# Patient Record
Sex: FEMALE | Race: WHITE | Employment: OTHER | RURAL
[De-identification: names, ages, dates, MRNs, and addresses within clinical notes are randomized per-mention and may not be internally consistent; named-entity substitution may affect disease eponyms.]

---

## 2018-07-31 ENCOUNTER — HOSPITAL ENCOUNTER (EMERGENCY)
Facility: HOSPITAL | Age: 73
Discharge: HOME | End: 2018-07-31
Attending: EMERGENCY MEDICINE
Payer: COMMERCIAL

## 2018-07-31 ENCOUNTER — APPOINTMENT (OUTPATIENT)
Dept: GENERAL RADIOLOGY | Facility: HOSPITAL | Age: 73
End: 2018-07-31
Payer: COMMERCIAL

## 2018-07-31 VITALS
HEIGHT: 63 IN | RESPIRATION RATE: 16 BRPM | HEART RATE: 104 BPM | OXYGEN SATURATION: 97 % | DIASTOLIC BLOOD PRESSURE: 57 MMHG | SYSTOLIC BLOOD PRESSURE: 109 MMHG | WEIGHT: 123 LBS | TEMPERATURE: 97.5 F | BODY MASS INDEX: 21.79 KG/M2

## 2018-07-31 DIAGNOSIS — S70.01XA CONTUSION OF RIGHT HIP, INITIAL ENCOUNTER: Primary | ICD-10-CM

## 2018-07-31 PROCEDURE — 73070 X-RAY EXAM OF ELBOW: CPT

## 2018-07-31 PROCEDURE — 73502 X-RAY EXAM HIP UNI 2-3 VIEWS: CPT

## 2018-07-31 PROCEDURE — 73030 X-RAY EXAM OF SHOULDER: CPT

## 2018-07-31 PROCEDURE — 99285 EMERGENCY DEPT VISIT HI MDM: CPT

## 2018-07-31 PROCEDURE — 6360000002 HC RX W HCPCS: Performed by: EMERGENCY MEDICINE

## 2018-07-31 PROCEDURE — 96372 THER/PROPH/DIAG INJ SC/IM: CPT

## 2018-07-31 RX ORDER — HYDROCODONE BITARTRATE AND ACETAMINOPHEN 10; 325 MG/1; MG/1
1 TABLET ORAL 4 TIMES DAILY PRN
Status: ON HOLD | COMMUNITY
End: 2019-07-03

## 2018-07-31 RX ORDER — MORPHINE SULFATE 4 MG/ML
4 INJECTION, SOLUTION INTRAMUSCULAR; INTRAVENOUS ONCE
Status: COMPLETED | OUTPATIENT
Start: 2018-07-31 | End: 2018-07-31

## 2018-07-31 RX ORDER — MIRTAZAPINE 15 MG/1
15 TABLET, FILM COATED ORAL NIGHTLY
COMMUNITY
End: 2019-07-02

## 2018-07-31 RX ADMIN — MORPHINE SULFATE 4 MG: 4 INJECTION INTRAVENOUS at 04:28

## 2018-07-31 NOTE — ED PROVIDER NOTES
Never Used    Alcohol use No    Drug use: No    Sexual activity: Not Asked     Other Topics Concern    None     Social History Narrative    None         PHYSICAL EXAM    (up to 7 for level 4, 8 or more for level 5)     ED Triage Vitals   BP Temp Temp src Pulse Resp SpO2 Height Weight   -- -- -- -- -- -- -- --       Physical Exam  General :Patient is awake, alert, oriented, in no acute distress, nontoxic appearing  HEENT: Pupils are equally round and reactive to light, EOMI. Cardiac: Heart regular rate, rhythm, no murmurs, rubs, or gallops  Lungs: Lungs are clear to auscultation, there is no wheezing, rhonchi, or rales. Abdomen: Abdomen is soft, nontender, nondistended. Musculoskeletal: Bedridden and contracted; pain with any movement of her RLE and right hip; diffuse right elbow tenderness to palpation but moving arm; no ecchymosis or deformity;   Back: No midline or bony tenderness. Dermatology: Skin is warm and dry  Psych: Mentation is grossly normal, cognition is grossly normal. Affect is appropriate. DIAGNOSTIC RESULTS       RADIOLOGY:   Non-plain film images such as CT, Ultrasound and MRI are read by the radiologist. Plain radiographic images are visualized and preliminarily interpreted by the emergency physician with the below findings:      [x] Radiologist's Report Reviewed:  XR HIP RIGHT (2-3 VIEWS)   Final Result     The right hip is not well seen. No acute fracture or dislocation. Consider repeat radiographs with improved positioning and technique if    there is clinical concern for right hip injury. XR ELBOW RIGHT (2 VIEWS)   Final Result     No acute fracture or dislocation. XR SHOULDER RIGHT (MIN 2 VIEWS)   Final Result     Degenerative changes of the glenohumeral and acromioclavicular joints. No acute osseous abnormality.                 ED BEDSIDE ULTRASOUND:   Performed by ED Physician - none    LABS:  Labs Reviewed - No data to display    I have reviewed

## 2018-07-31 NOTE — ED NOTES
Pt report given to [facility] . Pt will be transported back to [facility] and [address] [place] [address].       [address] [place] [place] RN  07/31/18 9724

## 2018-07-31 NOTE — ED NOTES
Placed call to [facility] to transport patient back to nursing home.      Greg Rosado RN  07/31/18 9937

## 2018-09-21 ENCOUNTER — OFFICE VISIT (OUTPATIENT)
Dept: SURGERY | Age: 73
End: 2018-09-21
Payer: COMMERCIAL

## 2018-09-21 VITALS — TEMPERATURE: 98.5 F | SYSTOLIC BLOOD PRESSURE: 122 MMHG | DIASTOLIC BLOOD PRESSURE: 36 MMHG | HEART RATE: 42 BPM

## 2018-09-21 DIAGNOSIS — R63.4 WEIGHT LOSS: ICD-10-CM

## 2018-09-21 DIAGNOSIS — F03.91 DEMENTIA WITH BEHAVIORAL DISTURBANCE, UNSPECIFIED DEMENTIA TYPE: Primary | ICD-10-CM

## 2018-09-21 PROCEDURE — 3017F COLORECTAL CA SCREEN DOC REV: CPT | Performed by: SURGERY

## 2018-09-21 PROCEDURE — 1101F PT FALLS ASSESS-DOCD LE1/YR: CPT | Performed by: SURGERY

## 2018-09-21 PROCEDURE — 99204 OFFICE O/P NEW MOD 45 MIN: CPT | Performed by: SURGERY

## 2018-09-21 PROCEDURE — 99204 OFFICE O/P NEW MOD 45 MIN: CPT

## 2018-09-21 PROCEDURE — 1090F PRES/ABSN URINE INCON ASSESS: CPT | Performed by: SURGERY

## 2018-09-21 PROCEDURE — G8420 CALC BMI NORM PARAMETERS: HCPCS | Performed by: SURGERY

## 2018-09-21 PROCEDURE — G8427 DOCREV CUR MEDS BY ELIG CLIN: HCPCS | Performed by: SURGERY

## 2018-09-21 RX ORDER — ONDANSETRON HYDROCHLORIDE 8 MG/1
8 TABLET, FILM COATED ORAL EVERY 8 HOURS PRN
Status: ON HOLD | COMMUNITY
End: 2019-07-04 | Stop reason: HOSPADM
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
## 2018-12-24 PROCEDURE — 81001 URINALYSIS AUTO W/SCOPE: CPT

## 2018-12-29 ENCOUNTER — LAB REQUISITION (OUTPATIENT)
Dept: LAB | Facility: HOSPITAL | Age: 73
End: 2018-12-29

## 2018-12-29 DIAGNOSIS — D64.9 ANEMIA: ICD-10-CM

## 2018-12-29 DIAGNOSIS — R53.1 WEAKNESS: ICD-10-CM

## 2018-12-29 LAB
ALBUMIN SERPL-MCNC: 4.23 G/DL (ref 3.2–4.8)
ALBUMIN/GLOB SERPL: 1.3 G/DL (ref 1.5–2.5)
ALP SERPL-CCNC: 98 U/L (ref 25–100)
ALT SERPL W P-5'-P-CCNC: 25 U/L (ref 7–40)
ANION GAP SERPL CALCULATED.3IONS-SCNC: 9 MMOL/L (ref 3–11)
AST SERPL-CCNC: 24 U/L (ref 0–33)
BASOPHILS # BLD AUTO: 0.04 10*3/MM3 (ref 0–0.2)
BASOPHILS NFR BLD AUTO: 0.6 % (ref 0–1)
BILIRUB SERPL-MCNC: 0.3 MG/DL (ref 0.3–1.2)
BUN BLD-MCNC: 31 MG/DL (ref 9–23)
BUN/CREAT SERPL: 37.3 (ref 7–25)
CALCIUM SPEC-SCNC: 9.3 MG/DL (ref 8.7–10.4)
CHLORIDE SERPL-SCNC: 100 MMOL/L (ref 99–109)
CO2 SERPL-SCNC: 27 MMOL/L (ref 20–31)
CREAT BLD-MCNC: 0.83 MG/DL (ref 0.6–1.3)
DEPRECATED RDW RBC AUTO: 43.8 FL (ref 37–54)
EOSINOPHIL # BLD AUTO: 0.05 10*3/MM3 (ref 0–0.3)
EOSINOPHIL NFR BLD AUTO: 0.7 % (ref 0–3)
ERYTHROCYTE [DISTWIDTH] IN BLOOD BY AUTOMATED COUNT: 16.6 % (ref 11.3–14.5)
GFR SERPL CREATININE-BSD FRML MDRD: 67 ML/MIN/1.73
GLOBULIN UR ELPH-MCNC: 3.2 GM/DL
GLUCOSE BLD-MCNC: 233 MG/DL (ref 70–100)
HCT VFR BLD AUTO: 29.6 % (ref 34.5–44)
HGB BLD-MCNC: 8.6 G/DL (ref 11.5–15.5)
HYPOCHROMIA BLD QL: NORMAL
IMM GRANULOCYTES # BLD AUTO: 0.01 10*3/MM3 (ref 0–0.03)
IMM GRANULOCYTES NFR BLD AUTO: 0.1 % (ref 0–0.6)
LYMPHOCYTES # BLD AUTO: 2.16 10*3/MM3 (ref 0.6–4.8)
LYMPHOCYTES NFR BLD AUTO: 30.4 % (ref 24–44)
MCH RBC QN AUTO: 21.1 PG (ref 27–31)
MCHC RBC AUTO-ENTMCNC: 29.1 G/DL (ref 32–36)
MCV RBC AUTO: 72.7 FL (ref 80–99)
MICROCYTES BLD QL: NORMAL
MONOCYTES # BLD AUTO: 0.59 10*3/MM3 (ref 0–1)
MONOCYTES NFR BLD AUTO: 8.3 % (ref 0–12)
NEUTROPHILS # BLD AUTO: 4.26 10*3/MM3 (ref 1.5–8.3)
NEUTROPHILS NFR BLD AUTO: 60 % (ref 41–71)
PLAT MORPH BLD: NORMAL
PLATELET # BLD AUTO: 542 10*3/MM3 (ref 150–450)
PMV BLD AUTO: 10.1 FL (ref 6–12)
POTASSIUM BLD-SCNC: 5.3 MMOL/L (ref 3.5–5.5)
PROT SERPL-MCNC: 7.4 G/DL (ref 5.7–8.2)
RBC # BLD AUTO: 4.07 10*6/MM3 (ref 3.89–5.14)
SODIUM BLD-SCNC: 136 MMOL/L (ref 132–146)
STOMATOCYTES BLD QL SMEAR: NORMAL
TARGETS BLD QL SMEAR: NORMAL
WBC MORPH BLD: NORMAL
WBC NRBC COR # BLD: 7.1 10*3/MM3 (ref 3.5–10.8)

## 2018-12-29 PROCEDURE — 85025 COMPLETE CBC W/AUTO DIFF WBC: CPT

## 2018-12-29 PROCEDURE — 80053 COMPREHEN METABOLIC PANEL: CPT

## 2018-12-29 PROCEDURE — 85007 BL SMEAR W/DIFF WBC COUNT: CPT

## 2019-01-01 ENCOUNTER — LAB REQUISITION (OUTPATIENT)
Dept: LAB | Facility: HOSPITAL | Age: 74
End: 2019-01-01

## 2019-01-01 DIAGNOSIS — Z00.00 ROUTINE GENERAL MEDICAL EXAMINATION AT A HEALTH CARE FACILITY: ICD-10-CM

## 2019-01-01 LAB
BACTERIA UR QL AUTO: ABNORMAL /HPF
BILIRUB UR QL STRIP: NEGATIVE
CLARITY UR: ABNORMAL
COLOR UR: ABNORMAL
GLUCOSE UR STRIP-MCNC: NEGATIVE MG/DL
HGB UR QL STRIP.AUTO: NEGATIVE
HYALINE CASTS UR QL AUTO: ABNORMAL /LPF
KETONES UR QL STRIP: NEGATIVE
LEUKOCYTE ESTERASE UR QL STRIP.AUTO: ABNORMAL
NITRITE UR QL STRIP: POSITIVE
PH UR STRIP.AUTO: <=5 [PH] (ref 5–8)
PROT UR QL STRIP: NEGATIVE
RBC # UR: ABNORMAL /HPF
REF LAB TEST METHOD: ABNORMAL
SP GR UR STRIP: 1.02 (ref 1–1.03)
SQUAMOUS #/AREA URNS HPF: ABNORMAL /HPF
UROBILINOGEN UR QL STRIP: ABNORMAL
WBC UR QL AUTO: ABNORMAL /HPF

## 2019-07-01 ENCOUNTER — LAB REQUISITION (OUTPATIENT)
Dept: LAB | Facility: HOSPITAL | Age: 74
End: 2019-07-01

## 2019-07-01 DIAGNOSIS — Z00.00 ROUTINE GENERAL MEDICAL EXAMINATION AT A HEALTH CARE FACILITY: ICD-10-CM

## 2019-07-01 LAB
ALBUMIN SERPL-MCNC: 3.3 G/DL (ref 3.5–5.2)
ALBUMIN/GLOB SERPL: 1 G/DL
ALP SERPL-CCNC: 79 U/L (ref 39–117)
ALT SERPL W P-5'-P-CCNC: 16 U/L (ref 1–33)
ANION GAP SERPL CALCULATED.3IONS-SCNC: 15 MMOL/L (ref 5–15)
AST SERPL-CCNC: 21 U/L (ref 1–32)
BASOPHILS # BLD AUTO: 0.02 10*3/MM3 (ref 0–0.2)
BASOPHILS NFR BLD AUTO: 0.4 % (ref 0–1.5)
BILIRUB SERPL-MCNC: <0.2 MG/DL (ref 0.2–1.2)
BUN BLD-MCNC: 80 MG/DL (ref 8–23)
BUN/CREAT SERPL: 51 (ref 7–25)
CALCIUM SPEC-SCNC: 9.1 MG/DL (ref 8.6–10.5)
CHLORIDE SERPL-SCNC: 95 MMOL/L (ref 98–107)
CO2 SERPL-SCNC: 22 MMOL/L (ref 22–29)
CREAT BLD-MCNC: 1.57 MG/DL (ref 0.57–1)
DEPRECATED RDW RBC AUTO: 50.2 FL (ref 37–54)
EOSINOPHIL # BLD AUTO: 0.09 10*3/MM3 (ref 0–0.4)
EOSINOPHIL NFR BLD AUTO: 1.6 % (ref 0.3–6.2)
ERYTHROCYTE [DISTWIDTH] IN BLOOD BY AUTOMATED COUNT: 16.1 % (ref 12.3–15.4)
GFR SERPL CREATININE-BSD FRML MDRD: 32 ML/MIN/1.73
GLOBULIN UR ELPH-MCNC: 3.4 GM/DL
GLUCOSE BLD-MCNC: 111 MG/DL (ref 65–99)
HCT VFR BLD AUTO: 22.4 % (ref 34–46.6)
HGB BLD-MCNC: 6.4 G/DL (ref 12–15.9)
IMM GRANULOCYTES # BLD AUTO: 0.02 10*3/MM3 (ref 0–0.05)
IMM GRANULOCYTES NFR BLD AUTO: 0.4 % (ref 0–0.5)
LYMPHOCYTES # BLD AUTO: 1.28 10*3/MM3 (ref 0.7–3.1)
LYMPHOCYTES NFR BLD AUTO: 23.4 % (ref 19.6–45.3)
MCH RBC QN AUTO: 24.4 PG (ref 26.6–33)
MCHC RBC AUTO-ENTMCNC: 28.6 G/DL (ref 31.5–35.7)
MCV RBC AUTO: 85.5 FL (ref 79–97)
MONOCYTES # BLD AUTO: 0.55 10*3/MM3 (ref 0.1–0.9)
MONOCYTES NFR BLD AUTO: 10.1 % (ref 5–12)
NEUTROPHILS # BLD AUTO: 3.51 10*3/MM3 (ref 1.7–7)
NEUTROPHILS NFR BLD AUTO: 64.1 % (ref 42.7–76)
NRBC BLD AUTO-RTO: 0 /100 WBC (ref 0–0.2)
PLATELET # BLD AUTO: 443 10*3/MM3 (ref 140–450)
PMV BLD AUTO: 10 FL (ref 6–12)
POTASSIUM BLD-SCNC: 4.8 MMOL/L (ref 3.5–5.2)
PROT SERPL-MCNC: 6.7 G/DL (ref 6–8.5)
RBC # BLD AUTO: 2.62 10*6/MM3 (ref 3.77–5.28)
SODIUM BLD-SCNC: 132 MMOL/L (ref 136–145)
WBC NRBC COR # BLD: 5.47 10*3/MM3 (ref 3.4–10.8)

## 2019-07-01 PROCEDURE — 85025 COMPLETE CBC W/AUTO DIFF WBC: CPT

## 2019-07-01 PROCEDURE — 80053 COMPREHEN METABOLIC PANEL: CPT

## 2019-07-02 ENCOUNTER — APPOINTMENT (OUTPATIENT)
Dept: CT IMAGING | Facility: HOSPITAL | Age: 74
DRG: 812 | End: 2019-07-02
Payer: MEDICARE

## 2019-07-02 ENCOUNTER — HOSPITAL ENCOUNTER (INPATIENT)
Facility: HOSPITAL | Age: 74
LOS: 2 days | Discharge: NURSING, REHAB OR LONG-TERM CARE | DRG: 812 | End: 2019-07-04
Attending: EMERGENCY MEDICINE | Admitting: INTERNAL MEDICINE
Payer: MEDICARE

## 2019-07-02 DIAGNOSIS — E87.1 HYPONATREMIA: ICD-10-CM

## 2019-07-02 DIAGNOSIS — D64.9 ANEMIA, UNSPECIFIED TYPE: Primary | ICD-10-CM

## 2019-07-02 DIAGNOSIS — K85.90 ACUTE PANCREATITIS, UNSPECIFIED COMPLICATION STATUS, UNSPECIFIED PANCREATITIS TYPE: ICD-10-CM

## 2019-07-02 DIAGNOSIS — D50.9 IRON DEFICIENCY ANEMIA, UNSPECIFIED IRON DEFICIENCY ANEMIA TYPE: ICD-10-CM

## 2019-07-02 PROBLEM — M54.9 CHRONIC BACK PAIN: Status: ACTIVE | Noted: 2019-07-02

## 2019-07-02 PROBLEM — R74.8 ELEVATED LIPASE: Status: ACTIVE | Noted: 2019-07-02

## 2019-07-02 PROBLEM — I95.9 HYPOTENSION: Status: ACTIVE | Noted: 2019-07-02

## 2019-07-02 PROBLEM — G89.29 CHRONIC BACK PAIN: Status: ACTIVE | Noted: 2019-07-02

## 2019-07-02 PROBLEM — N18.30 CKD (CHRONIC KIDNEY DISEASE) STAGE 3, GFR 30-59 ML/MIN (HCC): Status: ACTIVE | Noted: 2019-07-02

## 2019-07-02 PROBLEM — R53.81 DECLINING FUNCTIONAL STATUS: Status: ACTIVE | Noted: 2019-07-02

## 2019-07-02 PROBLEM — N17.9 ACUTE RENAL FAILURE (ARF) (HCC): Status: ACTIVE | Noted: 2019-07-02

## 2019-07-02 PROBLEM — N18.4 CKD (CHRONIC KIDNEY DISEASE) STAGE 4, GFR 15-29 ML/MIN (HCC): Status: ACTIVE | Noted: 2019-07-02

## 2019-07-02 PROBLEM — F03.90 DEMENTIA (HCC): Status: ACTIVE | Noted: 2019-07-02

## 2019-07-02 PROBLEM — E86.0 DEHYDRATION: Status: ACTIVE | Noted: 2019-07-02

## 2019-07-02 PROBLEM — R13.10 DYSPHAGIA: Status: ACTIVE | Noted: 2019-07-02

## 2019-07-02 LAB
A/G RATIO: 0.9 (ref 0.8–2)
ABO/RH: NORMAL
ALBUMIN SERPL-MCNC: 3 G/DL (ref 3.4–4.8)
ALP BLD-CCNC: 74 U/L (ref 25–100)
ALT SERPL-CCNC: 10 U/L (ref 4–36)
ANION GAP SERPL CALCULATED.3IONS-SCNC: 10 MMOL/L (ref 3–16)
ANION GAP SERPL CALCULATED.3IONS-SCNC: 11 MMOL/L (ref 3–16)
ANTIBODY SCREEN: NORMAL
AST SERPL-CCNC: 18 U/L (ref 8–33)
BACTERIA: ABNORMAL /HPF
BASOPHILS ABSOLUTE: 0 K/UL (ref 0–0.1)
BASOPHILS RELATIVE PERCENT: 0.4 %
BILIRUB SERPL-MCNC: <0.2 MG/DL (ref 0.3–1.2)
BILIRUBIN URINE: NEGATIVE
BLOOD BANK DISPENSE STATUS: NORMAL
BLOOD BANK DISPENSE STATUS: NORMAL
BLOOD BANK PRODUCT CODE: NORMAL
BLOOD BANK PRODUCT CODE: NORMAL
BLOOD, URINE: NEGATIVE
BPU ID: NORMAL
BPU ID: NORMAL
BUN BLDV-MCNC: 49 MG/DL (ref 6–20)
BUN BLDV-MCNC: 79 MG/DL (ref 6–20)
CALCIUM SERPL-MCNC: 8.6 MG/DL (ref 8.5–10.5)
CALCIUM SERPL-MCNC: 8.6 MG/DL (ref 8.5–10.5)
CHLORIDE BLD-SCNC: 106 MMOL/L (ref 98–107)
CHLORIDE BLD-SCNC: 96 MMOL/L (ref 98–107)
CLARITY: CLEAR
CO2: 21 MMOL/L (ref 20–30)
CO2: 22 MMOL/L (ref 20–30)
COLOR: YELLOW
COMPONENT: NORMAL
COMPONENT: NORMAL
CREAT SERPL-MCNC: 0.8 MG/DL (ref 0.4–1.2)
CREAT SERPL-MCNC: 1.5 MG/DL (ref 0.4–1.2)
DONOR TYPE/RH: NORMAL
DONOR TYPE/RH: NORMAL
EOSINOPHILS ABSOLUTE: 0.2 K/UL (ref 0–0.4)
EOSINOPHILS RELATIVE PERCENT: 3.4 %
EPITHELIAL CELLS, UA: ABNORMAL /HPF
FERRITIN: 32.8 NG/ML (ref 22–322)
FOLATE: >20 NG/ML
GFR AFRICAN AMERICAN: 41
GFR AFRICAN AMERICAN: >59
GFR NON-AFRICAN AMERICAN: 34
GFR NON-AFRICAN AMERICAN: >60
GLOBULIN: 3.3 G/DL
GLUCOSE BLD-MCNC: 106 MG/DL (ref 74–106)
GLUCOSE BLD-MCNC: 108 MG/DL (ref 74–106)
GLUCOSE BLD-MCNC: 128 MG/DL (ref 74–106)
GLUCOSE BLD-MCNC: 96 MG/DL (ref 74–106)
GLUCOSE BLD-MCNC: 97 MG/DL (ref 74–106)
GLUCOSE BLD-MCNC: 98 MG/DL (ref 74–106)
GLUCOSE URINE: NEGATIVE MG/DL
HBA1C MFR BLD: 5 %
HCT VFR BLD CALC: 21.4 % (ref 37–47)
HCT VFR BLD CALC: 28.4 % (ref 37–47)
HEMOGLOBIN: 6.3 G/DL (ref 11.5–16.5)
HEMOGLOBIN: 8.8 G/DL (ref 11.5–16.5)
HOLLISTER NO: NORMAL
HOLLISTER NO: NORMAL
IMMATURE GRANULOCYTES #: 0 K/UL
IMMATURE GRANULOCYTES %: 0.4 % (ref 0–5)
IRON SATURATION: 5 % (ref 15–50)
IRON: 18 UG/DL (ref 37–145)
KETONES, URINE: NEGATIVE MG/DL
LEUKOCYTE ESTERASE, URINE: NEGATIVE
LIPASE: 189 U/L (ref 5.6–51.3)
LYMPHOCYTES ABSOLUTE: 1.8 K/UL (ref 1.5–4)
LYMPHOCYTES RELATIVE PERCENT: 33.8 %
MCH RBC QN AUTO: 24.9 PG (ref 27–32)
MCH RBC QN AUTO: 26.3 PG (ref 27–32)
MCHC RBC AUTO-ENTMCNC: 29.4 G/DL (ref 31–35)
MCHC RBC AUTO-ENTMCNC: 31 G/DL (ref 31–35)
MCV RBC AUTO: 84.6 FL (ref 80–100)
MCV RBC AUTO: 84.8 FL (ref 80–100)
MICROSCOPIC EXAMINATION: YES
MONOCYTES ABSOLUTE: 0.6 K/UL (ref 0.2–0.8)
MONOCYTES RELATIVE PERCENT: 10.9 %
NEUTROPHILS ABSOLUTE: 2.7 K/UL (ref 2–7.5)
NEUTROPHILS RELATIVE PERCENT: 51.1 %
NITRITE, URINE: POSITIVE
OCCULT BLOOD SCREENING: ABNORMAL
PDW BLD-RTO: 15.1 % (ref 11–16)
PDW BLD-RTO: 16.2 % (ref 11–16)
PERFORMED ON: ABNORMAL
PERFORMED ON: ABNORMAL
PERFORMED ON: NORMAL
PERFORMED ON: NORMAL
PH UA: 5.5 (ref 5–8)
PLATELET # BLD: 324 K/UL (ref 150–400)
PLATELET # BLD: 389 K/UL (ref 150–400)
PMV BLD AUTO: 8.9 FL (ref 6–10)
PMV BLD AUTO: 9.3 FL (ref 6–10)
POTASSIUM SERPL-SCNC: 4.8 MMOL/L (ref 3.4–5.1)
POTASSIUM SERPL-SCNC: 4.8 MMOL/L (ref 3.4–5.1)
PROTEIN UA: NEGATIVE MG/DL
RBC # BLD: 2.53 M/UL (ref 3.8–5.8)
RBC # BLD: 3.35 M/UL (ref 3.8–5.8)
RBC UA: ABNORMAL /HPF (ref 0–2)
SODIUM BLD-SCNC: 129 MMOL/L (ref 136–145)
SODIUM BLD-SCNC: 137 MMOL/L (ref 136–145)
SPECIFIC GRAVITY UA: 1.01 (ref 1–1.03)
TOTAL CK: 134 U/L (ref 26–174)
TOTAL IRON BINDING CAPACITY: 379 UG/DL (ref 250–450)
TOTAL PROTEIN: 6.3 G/DL (ref 6.4–8.3)
TSH SERPL DL<=0.05 MIU/L-ACNC: 2.23 UIU/ML (ref 0.35–5.5)
URINE REFLEX TO CULTURE: YES
URINE TYPE: ABNORMAL
UROBILINOGEN, URINE: 0.2 E.U./DL
VITAMIN B-12: 1836 PG/ML (ref 211–911)
VITAMIN D 25-HYDROXY: 20.3 (ref 32–100)
WBC # BLD: 4.4 K/UL (ref 4–11)
WBC # BLD: 5.2 K/UL (ref 4–11)
WBC UA: ABNORMAL /HPF (ref 0–5)

## 2019-07-02 PROCEDURE — 83036 HEMOGLOBIN GLYCOSYLATED A1C: CPT

## 2019-07-02 PROCEDURE — 36415 COLL VENOUS BLD VENIPUNCTURE: CPT

## 2019-07-02 PROCEDURE — G0328 FECAL BLOOD SCRN IMMUNOASSAY: HCPCS

## 2019-07-02 PROCEDURE — 82607 VITAMIN B-12: CPT

## 2019-07-02 PROCEDURE — P9016 RBC LEUKOCYTES REDUCED: HCPCS

## 2019-07-02 PROCEDURE — 87186 SC STD MICRODIL/AGAR DIL: CPT

## 2019-07-02 PROCEDURE — 87077 CULTURE AEROBIC IDENTIFY: CPT

## 2019-07-02 PROCEDURE — 83550 IRON BINDING TEST: CPT

## 2019-07-02 PROCEDURE — G8996 SWALLOW CURRENT STATUS: HCPCS

## 2019-07-02 PROCEDURE — 6370000000 HC RX 637 (ALT 250 FOR IP): Performed by: INTERNAL MEDICINE

## 2019-07-02 PROCEDURE — 81001 URINALYSIS AUTO W/SCOPE: CPT

## 2019-07-02 PROCEDURE — 6360000002 HC RX W HCPCS: Performed by: PHYSICIAN ASSISTANT

## 2019-07-02 PROCEDURE — 85025 COMPLETE CBC W/AUTO DIFF WBC: CPT

## 2019-07-02 PROCEDURE — 82306 VITAMIN D 25 HYDROXY: CPT

## 2019-07-02 PROCEDURE — 86901 BLOOD TYPING SEROLOGIC RH(D): CPT

## 2019-07-02 PROCEDURE — 86900 BLOOD TYPING SEROLOGIC ABO: CPT

## 2019-07-02 PROCEDURE — 97166 OT EVAL MOD COMPLEX 45 MIN: CPT

## 2019-07-02 PROCEDURE — 86850 RBC ANTIBODY SCREEN: CPT

## 2019-07-02 PROCEDURE — 84443 ASSAY THYROID STIM HORMONE: CPT

## 2019-07-02 PROCEDURE — 2580000003 HC RX 258: Performed by: EMERGENCY MEDICINE

## 2019-07-02 PROCEDURE — 74176 CT ABD & PELVIS W/O CONTRAST: CPT

## 2019-07-02 PROCEDURE — 2580000003 HC RX 258: Performed by: PHYSICIAN ASSISTANT

## 2019-07-02 PROCEDURE — 86920 COMPATIBILITY TEST SPIN: CPT

## 2019-07-02 PROCEDURE — 97802 MEDICAL NUTRITION INDIV IN: CPT

## 2019-07-02 PROCEDURE — 36430 TRANSFUSION BLD/BLD COMPNT: CPT

## 2019-07-02 PROCEDURE — 82728 ASSAY OF FERRITIN: CPT

## 2019-07-02 PROCEDURE — G8998 SWALLOW D/C STATUS: HCPCS

## 2019-07-02 PROCEDURE — 82550 ASSAY OF CK (CPK): CPT

## 2019-07-02 PROCEDURE — 92610 EVALUATE SWALLOWING FUNCTION: CPT

## 2019-07-02 PROCEDURE — 83540 ASSAY OF IRON: CPT

## 2019-07-02 PROCEDURE — 85027 COMPLETE CBC AUTOMATED: CPT

## 2019-07-02 PROCEDURE — 6370000000 HC RX 637 (ALT 250 FOR IP): Performed by: PHYSICIAN ASSISTANT

## 2019-07-02 PROCEDURE — 99285 EMERGENCY DEPT VISIT HI MDM: CPT

## 2019-07-02 PROCEDURE — 83690 ASSAY OF LIPASE: CPT

## 2019-07-02 PROCEDURE — 87086 URINE CULTURE/COLONY COUNT: CPT

## 2019-07-02 PROCEDURE — 80053 COMPREHEN METABOLIC PANEL: CPT

## 2019-07-02 PROCEDURE — G8997 SWALLOW GOAL STATUS: HCPCS

## 2019-07-02 PROCEDURE — C9113 INJ PANTOPRAZOLE SODIUM, VIA: HCPCS | Performed by: PHYSICIAN ASSISTANT

## 2019-07-02 PROCEDURE — 82746 ASSAY OF FOLIC ACID SERUM: CPT

## 2019-07-02 PROCEDURE — 1200000000 HC SEMI PRIVATE

## 2019-07-02 PROCEDURE — 99223 1ST HOSP IP/OBS HIGH 75: CPT | Performed by: INTERNAL MEDICINE

## 2019-07-02 RX ORDER — BISACODYL 10 MG
10 SUPPOSITORY, RECTAL RECTAL ONCE
Status: COMPLETED | OUTPATIENT
Start: 2019-07-02 | End: 2019-07-02

## 2019-07-02 RX ORDER — FERROUS SULFATE 325(65) MG
325 TABLET ORAL
Status: ON HOLD | COMMUNITY
End: 2020-01-29 | Stop reason: SDUPTHER

## 2019-07-02 RX ORDER — DONEPEZIL HYDROCHLORIDE 5 MG/1
10 TABLET, FILM COATED ORAL NIGHTLY
Status: DISCONTINUED | OUTPATIENT
Start: 2019-07-02 | End: 2019-07-04 | Stop reason: HOSPADM

## 2019-07-02 RX ORDER — PANTOPRAZOLE SODIUM 40 MG/1
40 TABLET, DELAYED RELEASE ORAL
Status: DISCONTINUED | OUTPATIENT
Start: 2019-07-02 | End: 2019-07-02

## 2019-07-02 RX ORDER — BISACODYL 10 MG
10 SUPPOSITORY, RECTAL RECTAL DAILY PRN
Status: DISCONTINUED | OUTPATIENT
Start: 2019-07-02 | End: 2019-07-04 | Stop reason: HOSPADM

## 2019-07-02 RX ORDER — FERROUS SULFATE 325(65) MG
325 TABLET ORAL
Status: DISCONTINUED | OUTPATIENT
Start: 2019-07-02 | End: 2019-07-02 | Stop reason: ALTCHOICE

## 2019-07-02 RX ORDER — METRONIDAZOLE 500 MG/1
500 TABLET ORAL EVERY 8 HOURS SCHEDULED
Status: DISCONTINUED | OUTPATIENT
Start: 2019-07-02 | End: 2019-07-02

## 2019-07-02 RX ORDER — ONDANSETRON 4 MG/1
8 TABLET, FILM COATED ORAL EVERY 8 HOURS PRN
Status: DISCONTINUED | OUTPATIENT
Start: 2019-07-02 | End: 2019-07-04 | Stop reason: HOSPADM

## 2019-07-02 RX ORDER — 0.9 % SODIUM CHLORIDE 0.9 %
500 INTRAVENOUS SOLUTION INTRAVENOUS ONCE
Status: COMPLETED | OUTPATIENT
Start: 2019-07-02 | End: 2019-07-02

## 2019-07-02 RX ORDER — M-VIT,TX,IRON,MINS/CALC/FOLIC 27MG-0.4MG
1 TABLET ORAL DAILY
Status: DISCONTINUED | OUTPATIENT
Start: 2019-07-02 | End: 2019-07-04 | Stop reason: HOSPADM

## 2019-07-02 RX ORDER — PHENAZOPYRIDINE HYDROCHLORIDE 95 MG/1
95 TABLET ORAL DAILY
Status: DISCONTINUED | OUTPATIENT
Start: 2019-07-02 | End: 2019-07-02

## 2019-07-02 RX ORDER — 0.9 % SODIUM CHLORIDE 0.9 %
10 VIAL (ML) INJECTION DAILY
Status: DISCONTINUED | OUTPATIENT
Start: 2019-07-02 | End: 2019-07-03

## 2019-07-02 RX ORDER — MORPHINE SULFATE 15 MG/1
15 TABLET, FILM COATED, EXTENDED RELEASE ORAL 2 TIMES DAILY
Status: DISCONTINUED | OUTPATIENT
Start: 2019-07-02 | End: 2019-07-04 | Stop reason: HOSPADM

## 2019-07-02 RX ORDER — VENLAFAXINE HYDROCHLORIDE 150 MG/1
150 CAPSULE, EXTENDED RELEASE ORAL DAILY
Status: DISCONTINUED | OUTPATIENT
Start: 2019-07-02 | End: 2019-07-04 | Stop reason: HOSPADM

## 2019-07-02 RX ORDER — ACETAMINOPHEN 650 MG/1
650 SUPPOSITORY RECTAL EVERY 6 HOURS PRN
Status: DISCONTINUED | OUTPATIENT
Start: 2019-07-02 | End: 2019-07-04 | Stop reason: HOSPADM

## 2019-07-02 RX ORDER — DOCUSATE SODIUM 100 MG/1
100 CAPSULE, LIQUID FILLED ORAL DAILY
Status: DISCONTINUED | OUTPATIENT
Start: 2019-07-02 | End: 2019-07-04 | Stop reason: HOSPADM

## 2019-07-02 RX ORDER — BISACODYL 10 MG
10 SUPPOSITORY, RECTAL RECTAL DAILY PRN
COMMUNITY

## 2019-07-02 RX ORDER — PANTOPRAZOLE SODIUM 40 MG/10ML
40 INJECTION, POWDER, LYOPHILIZED, FOR SOLUTION INTRAVENOUS 2 TIMES DAILY
Status: DISCONTINUED | OUTPATIENT
Start: 2019-07-02 | End: 2019-07-03

## 2019-07-02 RX ORDER — SODIUM CHLORIDE 9 MG/ML
INJECTION, SOLUTION INTRAVENOUS CONTINUOUS
Status: DISCONTINUED | OUTPATIENT
Start: 2019-07-02 | End: 2019-07-04 | Stop reason: HOSPADM

## 2019-07-02 RX ORDER — SUCRALFATE 1 G/1
1 TABLET ORAL EVERY 6 HOURS SCHEDULED
Status: DISCONTINUED | OUTPATIENT
Start: 2019-07-02 | End: 2019-07-04 | Stop reason: HOSPADM

## 2019-07-02 RX ORDER — SIMVASTATIN 20 MG
40 TABLET ORAL DAILY
Status: DISCONTINUED | OUTPATIENT
Start: 2019-07-02 | End: 2019-07-04 | Stop reason: HOSPADM

## 2019-07-02 RX ORDER — PANTOPRAZOLE SODIUM 40 MG/1
40 GRANULE, DELAYED RELEASE ORAL EVERY OTHER DAY
Status: DISCONTINUED | OUTPATIENT
Start: 2019-07-02 | End: 2019-07-02

## 2019-07-02 RX ORDER — ACETAMINOPHEN 650 MG/1
650 SUPPOSITORY RECTAL EVERY 6 HOURS PRN
COMMUNITY

## 2019-07-02 RX ORDER — 0.9 % SODIUM CHLORIDE 0.9 %
1000 INTRAVENOUS SOLUTION INTRAVENOUS ONCE
Status: COMPLETED | OUTPATIENT
Start: 2019-07-02 | End: 2019-07-02

## 2019-07-02 RX ORDER — CYPROHEPTADINE HYDROCHLORIDE 4 MG/1
8 TABLET ORAL 3 TIMES DAILY
COMMUNITY

## 2019-07-02 RX ORDER — LISINOPRIL 20 MG/1
20 TABLET ORAL DAILY
Status: DISCONTINUED | OUTPATIENT
Start: 2019-07-02 | End: 2019-07-04 | Stop reason: HOSPADM

## 2019-07-02 RX ORDER — ZINC SULFATE 50(220)MG
220 CAPSULE ORAL DAILY
Status: DISCONTINUED | OUTPATIENT
Start: 2019-07-02 | End: 2019-07-04 | Stop reason: HOSPADM

## 2019-07-02 RX ORDER — CYPROHEPTADINE HYDROCHLORIDE 4 MG/1
4 TABLET ORAL 3 TIMES DAILY
Status: DISCONTINUED | OUTPATIENT
Start: 2019-07-02 | End: 2019-07-04 | Stop reason: HOSPADM

## 2019-07-02 RX ORDER — 0.9 % SODIUM CHLORIDE 0.9 %
250 INTRAVENOUS SOLUTION INTRAVENOUS ONCE
Status: DISCONTINUED | OUTPATIENT
Start: 2019-07-02 | End: 2019-07-04 | Stop reason: HOSPADM

## 2019-07-02 RX ORDER — FERROUS SULFATE TAB EC 324 MG (65 MG FE EQUIVALENT) 324 (65 FE) MG
324 TABLET DELAYED RESPONSE ORAL
Status: DISCONTINUED | OUTPATIENT
Start: 2019-07-02 | End: 2019-07-04 | Stop reason: HOSPADM

## 2019-07-02 RX ADMIN — CYPROHEPTADINE HYDROCHLORIDE 4 MG: 4 TABLET ORAL at 08:59

## 2019-07-02 RX ADMIN — IRON SUCROSE 200 MG: 20 INJECTION, SOLUTION INTRAVENOUS at 11:54

## 2019-07-02 RX ADMIN — BISACODYL 10 MG: 10 SUPPOSITORY RECTAL at 17:04

## 2019-07-02 RX ADMIN — SUCRALFATE 1 G: 1 TABLET ORAL at 17:04

## 2019-07-02 RX ADMIN — SIMVASTATIN 40 MG: 20 TABLET, FILM COATED ORAL at 08:59

## 2019-07-02 RX ADMIN — SODIUM CHLORIDE 1000 ML: 9 INJECTION, SOLUTION INTRAVENOUS at 01:10

## 2019-07-02 RX ADMIN — METRONIDAZOLE 500 MG: 500 TABLET, FILM COATED ORAL at 18:00

## 2019-07-02 RX ADMIN — MEROPENEM 1 G: 1 INJECTION, POWDER, FOR SOLUTION INTRAVENOUS at 22:30

## 2019-07-02 RX ADMIN — MORPHINE SULFATE 15 MG: 15 TABLET, FILM COATED, EXTENDED RELEASE ORAL at 21:42

## 2019-07-02 RX ADMIN — VENLAFAXINE HYDROCHLORIDE 150 MG: 150 CAPSULE, EXTENDED RELEASE ORAL at 08:58

## 2019-07-02 RX ADMIN — CYPROHEPTADINE HYDROCHLORIDE 4 MG: 4 TABLET ORAL at 13:33

## 2019-07-02 RX ADMIN — PANTOPRAZOLE SODIUM 40 MG: 40 GRANULE, DELAYED RELEASE ORAL at 08:59

## 2019-07-02 RX ADMIN — SODIUM CHLORIDE 500 ML: 9 INJECTION, SOLUTION INTRAVENOUS at 14:55

## 2019-07-02 RX ADMIN — MORPHINE SULFATE 15 MG: 15 TABLET, FILM COATED, EXTENDED RELEASE ORAL at 08:58

## 2019-07-02 RX ADMIN — CYPROHEPTADINE HYDROCHLORIDE 4 MG: 4 TABLET ORAL at 21:43

## 2019-07-02 RX ADMIN — METRONIDAZOLE 500 MG: 500 TABLET, FILM COATED ORAL at 21:42

## 2019-07-02 RX ADMIN — MULTIPLE VITAMINS W/ MINERALS TAB 1 TABLET: TAB at 08:59

## 2019-07-02 RX ADMIN — DOCUSATE SODIUM 100 MG: 100 CAPSULE, LIQUID FILLED ORAL at 17:04

## 2019-07-02 RX ADMIN — PANTOPRAZOLE SODIUM 40 MG: 40 INJECTION, POWDER, FOR SOLUTION INTRAVENOUS at 21:42

## 2019-07-02 RX ADMIN — DONEPEZIL HYDROCHLORIDE 10 MG: 5 TABLET, FILM COATED ORAL at 21:43

## 2019-07-02 RX ADMIN — FERROUS SULFATE TAB EC 324 MG (65 MG FE EQUIVALENT) 324 MG: 324 (65 FE) TABLET DELAYED RESPONSE at 08:59

## 2019-07-02 RX ADMIN — LINACLOTIDE 145 MCG: 145 CAPSULE, GELATIN COATED ORAL at 09:01

## 2019-07-02 RX ADMIN — ZINC SULFATE 220 MG (50 MG) CAPSULE 220 MG: CAPSULE at 08:59

## 2019-07-02 ASSESSMENT — PAIN SCALES - GENERAL
PAINLEVEL_OUTOF10: 10
PAINLEVEL_OUTOF10: 7
PAINLEVEL_OUTOF10: 7

## 2019-07-02 NOTE — CARE COORDINATION
Verbal orders received from PA-C to OT to cancel PT / OT orders due to functional status. Rehab orders can be submitted again if further evaluation is needed.       Electronically signed by Emily Williamson PT on 2/9/2648 at 5:44 PM

## 2019-07-02 NOTE — ED PROVIDER NOTES
Occupational History    None   Social Needs    Financial resource strain: None    Food insecurity:     Worry: None     Inability: None    Transportation needs:     Medical: None     Non-medical: None   Tobacco Use    Smoking status: Never Smoker    Smokeless tobacco: Never Used   Substance and Sexual Activity    Alcohol use: No    Drug use: No    Sexual activity: None   Lifestyle    Physical activity:     Days per week: None     Minutes per session: None    Stress: None   Relationships    Social connections:     Talks on phone: None     Gets together: None     Attends Mu-ism service: None     Active member of club or organization: None     Attends meetings of clubs or organizations: None     Relationship status: None    Intimate partner violence:     Fear of current or ex partner: None     Emotionally abused: None     Physically abused: None     Forced sexual activity: None   Other Topics Concern    None   Social History Narrative    None         PHYSICAL EXAM    (up to 7 forlevel 4, 8 or more for level 5)     ED Triage Vitals [07/02/19 0034]   BP Temp Temp Source Pulse Resp SpO2 Height Weight   (!) 83/38 98.2 °F (36.8 °C) Oral 99 14 94 % 5' 3\" (1.6 m) 116 lb (52.6 kg)       Physical Exam  General :Patient is awake, alert, nonverbal, in no acute distress, nontoxic appearing  HEENT: Pupils are equally round and reactive to light, EOMI. Pale conjunctivae  Cardiac: Heart regular rate, rhythm, no murmurs, rubs, or gallops  Lungs: Lungs are clear to auscultation, there is no wheezing, rhonchi, or rales. Abdomen:Abdomen is soft, nontender, nondistended. Musculoskeletal: Ambulatory  Back: No midline or bony tenderness.    Dermatology: Skin is warm and dry  Psych: Mentation is baseline dementia    DIAGNOSTIC RESULTS       RADIOLOGY:   Non-plain film images such as CT, Ultrasoundand MRI are read by the radiologist. Plain radiographic images are visualized and preliminarily interpreted by the emergency physician with the below findings:      [] Radiologist's Report Reviewed:  No orders to display         ED BEDSIDE ULTRASOUND:   Performed by ED Physician - none    LABS:  Labs Reviewed   CBC WITH AUTO DIFFERENTIAL - Abnormal; Notable for the following components:       Result Value    RBC 2.53 (*)     Hemoglobin 6.3 (*)     Hematocrit 21.4 (*)     MCH 24.9 (*)     MCHC 29.4 (*)     RDW 16.2 (*)     All other components within normal limits    Narrative:     RESULTS REPEATED AND VERIFIED  Performed at:  [address] [place] [address] Laboratory  [address] [place] [address]  [place], [address]   Phone [phone]   COMPREHENSIVE METABOLIC PANEL - Abnormal; Notable for the following components:    Sodium 129 (*)     Chloride 96 (*)     BUN 79 (*)     CREATININE 1.5 (*)     GFR Non- 34 (*)     GFR  41 (*)     Total Protein 6.3 (*)     Alb 3.0 (*)     Total Bilirubin <0.2 (*)     All other components within normal limits    Narrative:     Performed at:  [address] [place] [address] Laboratory  [address] [place], [place] [address], [address]   Phone [phone]   LIPASE - Abnormal; Notable for the following components:    Lipase 189.0 (*)     All other components within normal limits    Narrative:     Performed at:  [address] [place] [address] Laboratory  [address] [place] [address],  [place], [address]   Phone [phone]   TYPE AND SCREEN   PREPARE RBC (CROSSMATCH)       I have reviewed and interpreted all of the currently available lab resultsfrom this visit (if applicable):  Results for orders placed or performed during the hospital encounter of 07/02/19   CBC Auto Differential   Result Value Ref Range    WBC 5.2 4.0 - 11.0 K/uL    RBC 2.53 (L) 3.80 - 5.80 M/uL    Hemoglobin 6.3 (L) 11.5 - 16.5 g/dL    Hematocrit 21.4 (L) 37.0 - 47.0 %    MCV 84.6 80.0 - 100.0 fL    MCH 24.9 (L) 27.0 - 32.0 pg    MCHC 29.4 (L) 31.0 - 35.0 g/dL    RDW 16.2 (H) 11.0 -

## 2019-07-02 NOTE — H&P
rectally daily as needed for Constipation   Yes Historical Provider, MD   acetaminophen (TYLENOL) 650 MG suppository Place 650 mg rectally every 6 hours as needed for Fever   Yes Historical Provider, MD   ondansetron (ZOFRAN) 8 MG tablet Take 8 mg by mouth every 8 hours as needed for Nausea or Vomiting   Yes Historical Provider, MD   linaclotide (LINZESS) 145 MCG capsule Take 145 mcg by mouth every morning (before breakfast)   Yes Historical Provider, MD   HYDROcodone-acetaminophen (NORCO)  MG per tablet Take 1 tablet by mouth 4 times daily as needed for Pain. .   Yes Historical Provider, MD   Pantoprazole Sodium (PROTONIX PO) Take 40 mg by mouth every other day   Yes Historical Provider, MD   venlafaxine (EFFEXOR XR) 150 MG extended release capsule Take 150 mg by mouth daily   Yes Historical Provider, MD   Zinc Chelated 50 MG TABS Take 1 each by mouth daily   Yes Historical Provider, MD   metoprolol tartrate (LOPRESSOR) 50 MG tablet Take 25 mg by mouth daily   Yes Historical Provider, MD   Multiple Vitamins-Minerals (THERA M PLUS) TABS Take 1 each by mouth daily   Yes Historical Provider, MD   lisinopril (PRINIVIL;ZESTRIL) 20 MG tablet Take 20 mg by mouth daily   Yes Historical Provider, MD   pentosan polysulfate (ELMIRON) 100 MG capsule Take 100 mg by mouth 3 times daily (before meals)   Yes Historical Provider, MD   morphine (MS CONTIN) 15 MG extended release tablet Take 15 mg by mouth 2 times daily. .   Yes Historical Provider, MD   donepezil (ARICEPT) 10 MG tablet Take 10 mg by mouth nightly   Yes Historical Provider, MD   phenazopyridine (AZO URINARY PAIN RELIEF) 97.5 MG TABS Take 1 each by mouth daily   Yes Historical Provider, MD   simvastatin (ZOCOR) 40 MG tablet Take 40 mg by mouth daily   Yes Historical Provider, MD   metFORMIN (GLUCOPHAGE-XR) 750 MG extended release tablet Take 750 mg by mouth daily (with breakfast)    Historical Provider, MD   glipiZIDE (GLUCOTROL) 5 MG tablet Take 5 mg by mouth Oriented to date of birth. No apparent focal deficits noted   Psychiatric:  Speech and behavior appropriate         Lab Results   Component Value Date     (L) 07/02/2019    K 4.8 07/02/2019    CL 96 (L) 07/02/2019    CO2 22 07/02/2019    BUN 79 (H) 07/02/2019    CREATININE 1.5 (H) 07/02/2019    GLUCOSE 96 07/02/2019    CALCIUM 8.6 07/02/2019    PROT 6.3 (L) 07/02/2019    LABALBU 3.0 (L) 07/02/2019    BILITOT <0.2 (L) 07/02/2019    ALKPHOS 74 07/02/2019    AST 18 07/02/2019    ALT 10 07/02/2019    LABGLOM 34 (L) 07/02/2019    GFRAA 41 (L) 07/02/2019    AGRATIO 0.9 07/02/2019    GLOB 3.3 07/02/2019           Lab Results   Component Value Date    WBC 5.2 07/02/2019    HGB 6.3 (L) 07/02/2019    HCT 21.4 (L) 07/02/2019    MCV 84.6 07/02/2019     07/02/2019       CT ABDOMEN PELVIS WO CONTRAST Additional Contrast? None    (Results Pending)       Assessment and Plan     Active Hospital Problems    Diagnosis Date Noted    Anemia [D64.9]  Hgb 6.3. Was able to obtain labs from [facility] and patient with hemoglobin less than 7.5 on all lab work since march. venofer x2 ordered and will continue oral iron. Stool occult pending. On iv PPI and sucralfate. Will repeat cbc when transfusion complete. Monitor. Try to discuss with family when available regarding scopes. 07/02/2019    Dementia [F03.90]  Oriented to self and place. On donepezil.  07/02/2019    Declining functional status [R53.81]  [facility] term resident at [facility]. She has contractures in her legs and non ambulatory. Also with dementia and blind due to history of retinitis pigmentosa per chart review. Poor baseline functional status. Did review weights and she is currently 116. Appears that she was 120 lbs about 1 year ago when seen by dr shrestha. OT consulted but patient refuses. Hydrate with IVFs and transfuse. Discuss with family when available. 07/02/2019    Dehydration [E86.0]  Patient appears dehydrated on exam. Hyponatremic and hypotensive.

## 2019-07-03 PROBLEM — E55.9 VITAMIN D DEFICIENCY: Status: ACTIVE | Noted: 2019-07-03

## 2019-07-03 PROBLEM — N39.0 UTI (URINARY TRACT INFECTION): Status: ACTIVE | Noted: 2019-07-03

## 2019-07-03 PROBLEM — K56.41 FECAL IMPACTION IN RECTUM (HCC): Status: ACTIVE | Noted: 2019-07-03

## 2019-07-03 LAB
A/G RATIO: 1 (ref 0.8–2)
ALBUMIN SERPL-MCNC: 3.2 G/DL (ref 3.4–4.8)
ALP BLD-CCNC: 76 U/L (ref 25–100)
ALT SERPL-CCNC: 13 U/L (ref 4–36)
ANION GAP SERPL CALCULATED.3IONS-SCNC: 11 MMOL/L (ref 3–16)
AST SERPL-CCNC: 18 U/L (ref 8–33)
BILIRUB SERPL-MCNC: 0.4 MG/DL (ref 0.3–1.2)
BUN BLDV-MCNC: 28 MG/DL (ref 6–20)
CALCIUM SERPL-MCNC: 9.1 MG/DL (ref 8.5–10.5)
CHLORIDE BLD-SCNC: 109 MMOL/L (ref 98–107)
CO2: 21 MMOL/L (ref 20–30)
CREAT SERPL-MCNC: 0.7 MG/DL (ref 0.4–1.2)
GFR AFRICAN AMERICAN: >59
GFR NON-AFRICAN AMERICAN: >60
GLOBULIN: 3.2 G/DL
GLUCOSE BLD-MCNC: 122 MG/DL (ref 74–106)
GLUCOSE BLD-MCNC: 169 MG/DL (ref 74–106)
GLUCOSE BLD-MCNC: 169 MG/DL (ref 74–106)
GLUCOSE BLD-MCNC: 68 MG/DL (ref 74–106)
GLUCOSE BLD-MCNC: 96 MG/DL (ref 74–106)
GLUCOSE BLD-MCNC: 96 MG/DL (ref 74–106)
HCT VFR BLD CALC: 30.1 % (ref 37–47)
HEMOGLOBIN: 9.2 G/DL (ref 11.5–16.5)
MCH RBC QN AUTO: 26.3 PG (ref 27–32)
MCHC RBC AUTO-ENTMCNC: 30.6 G/DL (ref 31–35)
MCV RBC AUTO: 86 FL (ref 80–100)
PDW BLD-RTO: 15.7 % (ref 11–16)
PERFORMED ON: ABNORMAL
PERFORMED ON: NORMAL
PLATELET # BLD: 330 K/UL (ref 150–400)
PMV BLD AUTO: 9.2 FL (ref 6–10)
POTASSIUM SERPL-SCNC: 4.4 MMOL/L (ref 3.4–5.1)
RBC # BLD: 3.5 M/UL (ref 3.8–5.8)
SODIUM BLD-SCNC: 141 MMOL/L (ref 136–145)
TOTAL PROTEIN: 6.4 G/DL (ref 6.4–8.3)
WBC # BLD: 4.1 K/UL (ref 4–11)

## 2019-07-03 PROCEDURE — 6370000000 HC RX 637 (ALT 250 FOR IP): Performed by: INTERNAL MEDICINE

## 2019-07-03 PROCEDURE — 6370000000 HC RX 637 (ALT 250 FOR IP): Performed by: PHYSICIAN ASSISTANT

## 2019-07-03 PROCEDURE — 85027 COMPLETE CBC AUTOMATED: CPT

## 2019-07-03 PROCEDURE — 99232 SBSQ HOSP IP/OBS MODERATE 35: CPT | Performed by: INTERNAL MEDICINE

## 2019-07-03 PROCEDURE — 99222 1ST HOSP IP/OBS MODERATE 55: CPT | Performed by: SURGERY

## 2019-07-03 PROCEDURE — C9113 INJ PANTOPRAZOLE SODIUM, VIA: HCPCS | Performed by: PHYSICIAN ASSISTANT

## 2019-07-03 PROCEDURE — 6360000002 HC RX W HCPCS: Performed by: PHYSICIAN ASSISTANT

## 2019-07-03 PROCEDURE — 2580000003 HC RX 258: Performed by: PHYSICIAN ASSISTANT

## 2019-07-03 PROCEDURE — 36415 COLL VENOUS BLD VENIPUNCTURE: CPT

## 2019-07-03 PROCEDURE — 1200000000 HC SEMI PRIVATE

## 2019-07-03 PROCEDURE — 80053 COMPREHEN METABOLIC PANEL: CPT

## 2019-07-03 RX ORDER — PANTOPRAZOLE SODIUM 40 MG/10ML
40 INJECTION, POWDER, LYOPHILIZED, FOR SOLUTION INTRAVENOUS DAILY
Status: DISCONTINUED | OUTPATIENT
Start: 2019-07-04 | End: 2019-07-04 | Stop reason: HOSPADM

## 2019-07-03 RX ORDER — ERGOCALCIFEROL 1.25 MG/1
50000 CAPSULE ORAL WEEKLY
Status: DISCONTINUED | OUTPATIENT
Start: 2019-07-03 | End: 2019-07-04 | Stop reason: HOSPADM

## 2019-07-03 RX ORDER — 0.9 % SODIUM CHLORIDE 0.9 %
10 VIAL (ML) INJECTION DAILY
Status: DISCONTINUED | OUTPATIENT
Start: 2019-07-04 | End: 2019-07-04 | Stop reason: HOSPADM

## 2019-07-03 RX ADMIN — CYPROHEPTADINE HYDROCHLORIDE 4 MG: 4 TABLET ORAL at 20:52

## 2019-07-03 RX ADMIN — CYPROHEPTADINE HYDROCHLORIDE 4 MG: 4 TABLET ORAL at 09:33

## 2019-07-03 RX ADMIN — VENLAFAXINE HYDROCHLORIDE 150 MG: 150 CAPSULE, EXTENDED RELEASE ORAL at 09:33

## 2019-07-03 RX ADMIN — ERGOCALCIFEROL 50000 UNITS: 1.25 CAPSULE ORAL at 09:47

## 2019-07-03 RX ADMIN — DONEPEZIL HYDROCHLORIDE 10 MG: 5 TABLET, FILM COATED ORAL at 20:52

## 2019-07-03 RX ADMIN — CYPROHEPTADINE HYDROCHLORIDE 4 MG: 4 TABLET ORAL at 14:25

## 2019-07-03 RX ADMIN — ZINC SULFATE 220 MG (50 MG) CAPSULE 220 MG: CAPSULE at 09:33

## 2019-07-03 RX ADMIN — FERROUS SULFATE TAB EC 324 MG (65 MG FE EQUIVALENT) 324 MG: 324 (65 FE) TABLET DELAYED RESPONSE at 09:33

## 2019-07-03 RX ADMIN — SUCRALFATE 1 G: 1 TABLET ORAL at 18:29

## 2019-07-03 RX ADMIN — MORPHINE SULFATE 15 MG: 15 TABLET, FILM COATED, EXTENDED RELEASE ORAL at 09:47

## 2019-07-03 RX ADMIN — IRON SUCROSE 200 MG: 20 INJECTION, SOLUTION INTRAVENOUS at 10:33

## 2019-07-03 RX ADMIN — Medication 10 ML: at 09:47

## 2019-07-03 RX ADMIN — PANTOPRAZOLE SODIUM 40 MG: 40 INJECTION, POWDER, FOR SOLUTION INTRAVENOUS at 09:47

## 2019-07-03 RX ADMIN — INSULIN LISPRO 1 UNITS: 100 INJECTION, SOLUTION INTRAVENOUS; SUBCUTANEOUS at 11:37

## 2019-07-03 RX ADMIN — MEROPENEM 1 G: 1 INJECTION, POWDER, FOR SOLUTION INTRAVENOUS at 23:14

## 2019-07-03 RX ADMIN — MULTIPLE VITAMINS W/ MINERALS TAB 1 TABLET: TAB at 09:33

## 2019-07-03 RX ADMIN — DOCUSATE SODIUM 100 MG: 100 CAPSULE, LIQUID FILLED ORAL at 09:34

## 2019-07-03 RX ADMIN — SIMVASTATIN 40 MG: 20 TABLET, FILM COATED ORAL at 09:33

## 2019-07-03 RX ADMIN — MORPHINE SULFATE 15 MG: 15 TABLET, FILM COATED, EXTENDED RELEASE ORAL at 20:52

## 2019-07-03 RX ADMIN — SUCRALFATE 1 G: 1 TABLET ORAL at 11:37

## 2019-07-03 RX ADMIN — MEROPENEM 1 G: 1 INJECTION, POWDER, FOR SOLUTION INTRAVENOUS at 09:48

## 2019-07-03 ASSESSMENT — PAIN DESCRIPTION - PAIN TYPE
TYPE: ACUTE PAIN
TYPE: CHRONIC PAIN

## 2019-07-03 ASSESSMENT — PAIN SCALES - GENERAL
PAINLEVEL_OUTOF10: 5
PAINLEVEL_OUTOF10: 7
PAINLEVEL_OUTOF10: 6

## 2019-07-03 ASSESSMENT — PAIN DESCRIPTION - LOCATION
LOCATION: HEAD
LOCATION: HEAD
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
## 2019-07-04 VITALS
RESPIRATION RATE: 18 BRPM | SYSTOLIC BLOOD PRESSURE: 139 MMHG | TEMPERATURE: 98.7 F | DIASTOLIC BLOOD PRESSURE: 65 MMHG | BODY MASS INDEX: 20.55 KG/M2 | HEART RATE: 101 BPM | HEIGHT: 63 IN | OXYGEN SATURATION: 95 % | WEIGHT: 116 LBS

## 2019-07-04 LAB
ANION GAP SERPL CALCULATED.3IONS-SCNC: 8 MMOL/L (ref 3–16)
BUN BLDV-MCNC: 13 MG/DL (ref 6–20)
CALCIUM SERPL-MCNC: 9 MG/DL (ref 8.5–10.5)
CHLORIDE BLD-SCNC: 108 MMOL/L (ref 98–107)
CO2: 23 MMOL/L (ref 20–30)
CREAT SERPL-MCNC: 0.6 MG/DL (ref 0.4–1.2)
GFR AFRICAN AMERICAN: >59
GFR NON-AFRICAN AMERICAN: >60
GLUCOSE BLD-MCNC: 110 MG/DL (ref 74–106)
GLUCOSE BLD-MCNC: 114 MG/DL (ref 74–106)
HCT VFR BLD CALC: 29.5 % (ref 37–47)
HEMOGLOBIN: 8.8 G/DL (ref 11.5–16.5)
MCH RBC QN AUTO: 25.7 PG (ref 27–32)
MCHC RBC AUTO-ENTMCNC: 29.8 G/DL (ref 31–35)
MCV RBC AUTO: 86.3 FL (ref 80–100)
PDW BLD-RTO: 15.5 % (ref 11–16)
PERFORMED ON: ABNORMAL
PLATELET # BLD: 333 K/UL (ref 150–400)
PMV BLD AUTO: 9.3 FL (ref 6–10)
POTASSIUM SERPL-SCNC: 4.1 MMOL/L (ref 3.4–5.1)
RBC # BLD: 3.42 M/UL (ref 3.8–5.8)
SODIUM BLD-SCNC: 139 MMOL/L (ref 136–145)
WBC # BLD: 4.9 K/UL (ref 4–11)

## 2019-07-04 PROCEDURE — 2580000003 HC RX 258: Performed by: PHYSICIAN ASSISTANT

## 2019-07-04 PROCEDURE — 6370000000 HC RX 637 (ALT 250 FOR IP): Performed by: INTERNAL MEDICINE

## 2019-07-04 PROCEDURE — 36415 COLL VENOUS BLD VENIPUNCTURE: CPT

## 2019-07-04 PROCEDURE — 6370000000 HC RX 637 (ALT 250 FOR IP): Performed by: PHYSICIAN ASSISTANT

## 2019-07-04 PROCEDURE — 6360000002 HC RX W HCPCS: Performed by: PHYSICIAN ASSISTANT

## 2019-07-04 PROCEDURE — 85027 COMPLETE CBC AUTOMATED: CPT

## 2019-07-04 PROCEDURE — C9113 INJ PANTOPRAZOLE SODIUM, VIA: HCPCS | Performed by: PHYSICIAN ASSISTANT

## 2019-07-04 PROCEDURE — 80048 BASIC METABOLIC PNL TOTAL CA: CPT

## 2019-07-04 PROCEDURE — 99238 HOSP IP/OBS DSCHRG MGMT 30/<: CPT | Performed by: INTERNAL MEDICINE

## 2019-07-04 RX ORDER — PANTOPRAZOLE SODIUM 40 MG/1
40 TABLET, DELAYED RELEASE ORAL EVERY OTHER DAY
Qty: 30 TABLET | Refills: 3 | Status: SHIPPED | OUTPATIENT
Start: 2019-07-04 | End: 2019-10-27

## 2019-07-04 RX ORDER — PSEUDOEPHEDRINE HCL 30 MG
100 TABLET ORAL DAILY
Qty: 30 CAPSULE | Refills: 0 | Status: SHIPPED | OUTPATIENT
Start: 2019-07-05 | End: 2019-10-27

## 2019-07-04 RX ORDER — LEVOFLOXACIN 500 MG/1
500 TABLET, FILM COATED ORAL DAILY
Qty: 7 TABLET | Refills: 0 | Status: SHIPPED | OUTPATIENT
Start: 2019-07-05 | End: 2019-07-12

## 2019-07-04 RX ORDER — SUCRALFATE 1 G/1
1 TABLET ORAL 4 TIMES DAILY
Qty: 120 TABLET | Refills: 0 | Status: SHIPPED | OUTPATIENT
Start: 2019-07-04

## 2019-07-04 RX ADMIN — LINACLOTIDE 145 MCG: 145 CAPSULE, GELATIN COATED ORAL at 06:04

## 2019-07-04 RX ADMIN — FERROUS SULFATE TAB EC 324 MG (65 MG FE EQUIVALENT) 324 MG: 324 (65 FE) TABLET DELAYED RESPONSE at 09:51

## 2019-07-04 RX ADMIN — SODIUM CHLORIDE 10 ML: 9 INJECTION, SOLUTION INTRAMUSCULAR; INTRAVENOUS; SUBCUTANEOUS at 09:51

## 2019-07-04 RX ADMIN — SIMVASTATIN 40 MG: 20 TABLET, FILM COATED ORAL at 09:51

## 2019-07-04 RX ADMIN — VENLAFAXINE HYDROCHLORIDE 150 MG: 150 CAPSULE, EXTENDED RELEASE ORAL at 09:51

## 2019-07-04 RX ADMIN — MORPHINE SULFATE 15 MG: 15 TABLET, FILM COATED, EXTENDED RELEASE ORAL at 09:51

## 2019-07-04 RX ADMIN — MULTIPLE VITAMINS W/ MINERALS TAB 1 TABLET: TAB at 09:52

## 2019-07-04 RX ADMIN — ZINC SULFATE 220 MG (50 MG) CAPSULE 220 MG: CAPSULE at 09:50

## 2019-07-04 RX ADMIN — CYPROHEPTADINE HYDROCHLORIDE 4 MG: 4 TABLET ORAL at 09:51

## 2019-07-04 RX ADMIN — SODIUM CHLORIDE: 9 INJECTION, SOLUTION INTRAVENOUS at 09:59

## 2019-07-04 RX ADMIN — SUCRALFATE 1 G: 1 TABLET ORAL at 06:04

## 2019-07-04 RX ADMIN — MEROPENEM 1 G: 1 INJECTION, POWDER, FOR SOLUTION INTRAVENOUS at 11:15

## 2019-07-04 RX ADMIN — DOCUSATE SODIUM 100 MG: 100 CAPSULE, LIQUID FILLED ORAL at 09:51

## 2019-07-04 RX ADMIN — SUCRALFATE 1 G: 1 TABLET ORAL at 11:15

## 2019-07-04 RX ADMIN — PANTOPRAZOLE SODIUM 40 MG: 40 INJECTION, POWDER, FOR SOLUTION INTRAVENOUS at 09:51

## 2019-07-04 ASSESSMENT — PAIN SCALES - GENERAL: PAINLEVEL_OUTOF10: 7

## 2019-07-04 NOTE — DISCHARGE SUMMARY
back pain [M54.9, G89.29]  Continue home ms contin. Prescription for 3 days of medication provided per dr Shayla Chandler prior to dc.  07/02/2019    Hyponatremia [E87.1]  Na 129 on arrival. Within normal limits with IVF hydration. encourage oral intake at nursing facility and assist with feedings. 07/02/2019    Elevated lipase [R74.8]  No nausea, vomiting, abdominal pain. Ct abd/pelv without pancreatic abnormality. Hydrated with IVFs. 07/02/2019    Dysphagia [R13.10]  Continue diet recommended by slp. Aspiration precautions. 07/02/2019    Acute renal failure (ARF) (HCC) [N17.9]  Improved with IV fluid hydration. Renal function at baseline creatinine 0.6 prior to discharge back to nursing facility. Encourage oral intake at facility as above. Monitor labs periodically. Follow-up with PCP. 07/02/2019    Diabetes mellitus ([facility] [address] [E11.9]  Glipizide and metformin stopped on admission. A1c 5 . Did have episode of hypoglycemia with glucose 55 during hospital course with dose of low sliding scale insulin. She does not require diabetic medications or insulin at this time. Vital Signs  Temp: 98.7 °F (37.1 °C)  Pulse: 101  Resp: 18  BP: 139/65  SpO2: 95 %  O2 Device: None (Room air)       Vital signs reviewed in electronic chart. Physical exam  Constitutional:  Well developed, thin and frail elderly lady, no acute distress  Eyes:  Keeps eyes closed, conjunctiva normal, blind    HENT:  Atraumatic, external ears normal, nose normal, oropharynx and tongue dry, lips dry, no pharyngeal exudates. Neck- supple, no JVD, no lymphadenopathy  Respiratory:  No respiratory distress on room air, no wheezing, rales or rhonchi detected  Cardiovascular:  Normal rate, normal rhythm, no murmurs, no gallops, no rubs, no edema   GI:  Soft, nondistended, normal bowel sounds, nontender, no voluntary guarding  Musculoskeletal:  No cyanosis . Contractures noted BLE with decreased ROM.    Integument:  Warm and dry.  No rash.   Neurologic:  Alert, oriented to self and place. Oriented to date of birth. No apparent focal deficits noted . Answers questions appropriately. Tardive dyskinesia noted  Psychiatric:  Speech and behavior appropriate        Disposition: long term care facility  Discharged Condition: Stable  Activity: activity as tolerated  Diet: regular diet, pureed   Follow Up: Primary Care Physician in 1 week. Follow up with dr shrestha Monday for egd/colonoscopy. Will need bowel prep as ordered by dr shrestha on Sunday at nursing facility. Recommend patient proceed with the following lab (cbc) on 7/7     Labs:  For convenience and continuity at follow-up the following most recent labs are provided:    CBC:   Lab Results   Component Value Date    WBC 4.9 07/04/2019    HGB 8.8 07/04/2019    HCT 29.5 07/04/2019     07/04/2019       RENAL:   Lab Results   Component Value Date     07/04/2019    K 4.1 07/04/2019     07/04/2019    CO2 23 07/04/2019    BUN 13 07/04/2019    CREATININE 0.6 07/04/2019         Discharge Medications:      Current Discharge Medication List           Details   docusate sodium (COLACE, DULCOLAX) 100 MG CAPS Take 100 mg by mouth daily  Qty: 30 capsule, Refills: 0      sucralfate (CARAFATE) 1 GM tablet Take 1 tablet by mouth 4 times daily  Qty: 120 tablet, Refills: 0      levofloxacin (LEVAQUIN) 500 MG tablet Take 1 tablet by mouth daily for 7 days  Qty: 7 tablet, Refills: 0              Details   pantoprazole (PROTONIX) 40 MG tablet Take 1 tablet by mouth every other day Indications: suspension 30 mins prior to meal every other day  Qty: 30 tablet, Refills: 3      linaclotide (LINZESS) 145 MCG capsule Take 2 capsules by mouth every morning (before breakfast)  Qty: 60 capsule, Refills: 0              Details   cyproheptadine (PERIACTIN) 4 MG tablet Take 4 mg by mouth 3 times daily      ferrous sulfate 325 (65 Fe) MG tablet Take 325 mg by mouth daily (with breakfast)      bisacodyl (DULCOLAX)

## 2019-07-04 NOTE — PROGRESS NOTES
A medication reconciliation was performed for this patient per the fill records provided by [facility], the nursing home the patient currently resides at. The discrepancies and solutions are as follows:   - According to the home medications list, the patient was receiving Norco  mg, but this was not listed on the fill history. This medication was removed from the home meds list after confirming with the home that she does not fill this medication.
Report given to [facility]  RN, at [facility] [place], no further question. Reviewed DC instructions including Bowel prep for Dbl scope scheduled of Monday.
Speech Language Pathology  Facility/Department: [facility] MED SURG   CLINICAL BEDSIDE SWALLOW EVALUATION    NAME: Fermín Elkins  : 1945  MRN: [id number]    ADMISSION DATE: 2019  ADMITTING DIAGNOSIS: has Anemia on their problem list.  ONSET DATE: 2019    Recent Chest Xray/CT of Chest:  N/A  Date of Eval: 2019  Evaluating Therapist: Nitin Hilario    Current Diet level:  Current Diet : Puree  Current Liquid Diet : Thin    Primary Complaint  Patient Complaint: Patient reported \"I haven't been feeling good\". Pain:  Pain Assessment  Pain Level: 10    Reason for Referral  Fermín Elkins was referred for a bedside swallow evaluation to assess the efficiency of her swallow function, identify signs and symptoms of aspiration and make recommendations regarding safe dietary consistencies, effective compensatory strategies, and safe eating environment. Impression  Dysphagia Diagnosis: Moderate oral stage dysphagia  Dysphagia Impression : Mod oral phase dysphagia  Dysphagia Outcome Severity Scale: Level 4: Mild moderate dysphagia- Intermittent supervision/cueing. One - two diet consistencies restricted     Treatment Plan  Requires SLP Intervention: No  Duration/Frequency of Treatment: Not indicated at this time. D/C Recommendations: 24 hour supervision/assistance    Recommended Diet and Intervention  Diet Solids Recommendation: Dysphagia I Pureed  Liquid Consistency Recommendation: Thin  Recommended Form of Meds: Crushed in puree as able     Compensatory Swallowing Strategies  Compensatory Swallowing Strategies: Alternate solids and liquids;Small bites/sips;Upright as possible for all oral intake;Swallow 2 times per bite/sip; Assist feed;Eat/Feed slowly; Lingual sweep;Remain upright for 30-45 minutes after meals; Check for pocketing of food on the Left; Check for pocketing of food on the Right    Treatment/Goals  Short-term Goals  Timeframe for Short-term Goals: N/A  Long-term Goals  Timeframe
Spoke with Mike Cramer, daughter cell number [phone] ( new). She stated her mother had two other transfusions before. The scratch marks all over her body started a little over a month ago. She stated her heels have been bothering her. 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  She stated that she also was on pureed diet but she was not happy with it at the nursing home, the family gives her what she wants while they are there,
and not feeling well. Pt agreeable to OT evaluation. Patient Currently in Pain: Denies  Vital Signs  Patient Currently in Pain: Denies  Social/Functional History  Social/Functional History  Type of Home: Facility  Additional Comments: Pt is LTC resident at [facility] 1 year. Pt is dependent for all aspects of self care. [email or web address] and staff transports her to dining room. Pt states staff feeds her. Objective   Vision: Impaired  Vision Exceptions: Legally blind  Hearing: Within functional limits    Orientation  Overall Orientation Status: Within Functional Limits  Observation/Palpation  Observation: Pt lying in bed, NAD, pleasant and cooperative. Pt receiving blood transfusion, BLE contractures,               Bed mobility  Comment: Pt declined to complete bed mobility. Transfers  Sit to stand: Unable to assess  Vision - Basic Assessment  Prior Vision: Blind  Cognition  Overall Cognitive Status: WFL                 LUE AROM (degrees)  LUE AROM : WFL  RUE AROM (degrees)  RUE AROM : WFL  LUE Strength  Gross LUE Strength: WFL  RUE Strength  Gross RUE Strength: WFL        Therapy Time   Individual Concurrent Group Co-treatment   Time In 1120         Time Out 5512         Minutes 12              This note serves as a DC summary in the event of pt discharge.      Meera Gan, OTR/L

## 2019-07-05 LAB
ORGANISM: ABNORMAL
URINE CULTURE, ROUTINE: ABNORMAL
URINE CULTURE, ROUTINE: ABNORMAL

## 2019-07-08 ENCOUNTER — HOSPITAL ENCOUNTER (OUTPATIENT)
Facility: HOSPITAL | Age: 74
Setting detail: OUTPATIENT SURGERY
Discharge: HOME | End: 2019-07-08
Attending: SURGERY | Admitting: SURGERY
Payer: MEDICARE

## 2019-07-08 VITALS
DIASTOLIC BLOOD PRESSURE: 70 MMHG | TEMPERATURE: 98.3 F | WEIGHT: 104 LBS | BODY MASS INDEX: 18.43 KG/M2 | HEART RATE: 81 BPM | RESPIRATION RATE: 16 BRPM | HEIGHT: 63 IN | SYSTOLIC BLOOD PRESSURE: 107 MMHG | OXYGEN SATURATION: 99 %

## 2019-07-08 LAB
GLUCOSE BLD-MCNC: 114 MG/DL (ref 74–106)
HCT VFR BLD CALC: 28.7 % (ref 37–47)
HEMOGLOBIN: 8.6 G/DL (ref 11.5–16.5)
MCH RBC QN AUTO: 26.5 PG (ref 27–32)
MCHC RBC AUTO-ENTMCNC: 30 G/DL (ref 31–35)
MCV RBC AUTO: 88.3 FL (ref 80–100)
PDW BLD-RTO: 16.4 % (ref 11–16)
PERFORMED ON: ABNORMAL
PLATELET # BLD: 270 K/UL (ref 150–400)
PMV BLD AUTO: 9.4 FL (ref 6–10)
RBC # BLD: 3.25 M/UL (ref 3.8–5.8)
WBC # BLD: 3.4 K/UL (ref 4–11)

## 2019-07-08 PROCEDURE — 3609010600 HC COLONOSCOPY POLYPECTOMY SNARE/COLD BIOPSY: Performed by: SURGERY

## 2019-07-08 PROCEDURE — 43239 EGD BIOPSY SINGLE/MULTIPLE: CPT | Performed by: SURGERY

## 2019-07-08 PROCEDURE — 7100000011 HC PHASE II RECOVERY - ADDTL 15 MIN: Performed by: SURGERY

## 2019-07-08 PROCEDURE — 2580000003 HC RX 258: Performed by: SURGERY

## 2019-07-08 PROCEDURE — 6370000000 HC RX 637 (ALT 250 FOR IP): Performed by: SURGERY

## 2019-07-08 PROCEDURE — 45380 COLONOSCOPY AND BIOPSY: CPT | Performed by: SURGERY

## 2019-07-08 PROCEDURE — 99152 MOD SED SAME PHYS/QHP 5/>YRS: CPT | Performed by: SURGERY

## 2019-07-08 PROCEDURE — 3609012400 HC EGD TRANSORAL BIOPSY SINGLE/MULTIPLE: Performed by: SURGERY

## 2019-07-08 PROCEDURE — 85027 COMPLETE CBC AUTOMATED: CPT

## 2019-07-08 PROCEDURE — 36415 COLL VENOUS BLD VENIPUNCTURE: CPT

## 2019-07-08 PROCEDURE — 99153 MOD SED SAME PHYS/QHP EA: CPT | Performed by: SURGERY

## 2019-07-08 PROCEDURE — 6360000002 HC RX W HCPCS: Performed by: SURGERY

## 2019-07-08 PROCEDURE — 88305 TISSUE EXAM BY PATHOLOGIST: CPT

## 2019-07-08 PROCEDURE — 2709999900 HC NON-CHARGEABLE SUPPLY: Performed by: SURGERY

## 2019-07-08 PROCEDURE — 7100000010 HC PHASE II RECOVERY - FIRST 15 MIN: Performed by: SURGERY

## 2019-07-08 RX ORDER — LIDOCAINE HYDROCHLORIDE 40 MG/ML
SOLUTION TOPICAL PRN
Status: DISCONTINUED | OUTPATIENT
Start: 2019-07-08 | End: 2019-07-08 | Stop reason: ALTCHOICE

## 2019-07-08 RX ORDER — SODIUM CHLORIDE, SODIUM LACTATE, POTASSIUM CHLORIDE, CALCIUM CHLORIDE 600; 310; 30; 20 MG/100ML; MG/100ML; MG/100ML; MG/100ML
INJECTION, SOLUTION INTRAVENOUS CONTINUOUS
Status: DISCONTINUED | OUTPATIENT
Start: 2019-07-08 | End: 2019-07-08 | Stop reason: HOSPADM

## 2019-07-08 RX ORDER — MIDAZOLAM HYDROCHLORIDE 1 MG/ML
INJECTION INTRAMUSCULAR; INTRAVENOUS PRN
Status: DISCONTINUED | OUTPATIENT
Start: 2019-07-08 | End: 2019-07-08 | Stop reason: ALTCHOICE

## 2019-07-08 RX ORDER — MEPERIDINE HYDROCHLORIDE 50 MG/ML
INJECTION INTRAMUSCULAR; INTRAVENOUS; SUBCUTANEOUS PRN
Status: DISCONTINUED | OUTPATIENT
Start: 2019-07-08 | End: 2019-07-08 | Stop reason: ALTCHOICE

## 2019-07-08 RX ORDER — SODIUM CHLORIDE 0.9 % (FLUSH) 0.9 %
10 SYRINGE (ML) INJECTION PRN
Status: DISCONTINUED | OUTPATIENT
Start: 2019-07-08 | End: 2019-07-08 | Stop reason: HOSPADM

## 2019-07-08 RX ORDER — SODIUM CHLORIDE 0.9 % (FLUSH) 0.9 %
10 SYRINGE (ML) INJECTION EVERY 12 HOURS SCHEDULED
Status: DISCONTINUED | OUTPATIENT
Start: 2019-07-08 | End: 2019-07-08 | Stop reason: HOSPADM

## 2019-07-08 RX ADMIN — SODIUM CHLORIDE, POTASSIUM CHLORIDE, SODIUM LACTATE AND CALCIUM CHLORIDE: 600; 310; 30; 20 INJECTION, SOLUTION INTRAVENOUS at 10:57

## 2019-08-01 PROBLEM — E86.0 DEHYDRATION: Status: RESOLVED | Noted: 2019-07-02 | Resolved: 2019-08-01

## 2019-08-02 PROBLEM — N39.0 UTI (URINARY TRACT INFECTION): Status: RESOLVED | Noted: 2019-07-03 | Resolved: 2019-08-02

## 2019-09-27 ENCOUNTER — LAB REQUISITION (OUTPATIENT)
Dept: LAB | Facility: HOSPITAL | Age: 74
End: 2019-09-27

## 2019-09-27 DIAGNOSIS — Z00.00 ROUTINE GENERAL MEDICAL EXAMINATION AT A HEALTH CARE FACILITY: ICD-10-CM

## 2019-09-27 LAB
BASOPHILS # BLD AUTO: 0.04 10*3/MM3 (ref 0–0.2)
BASOPHILS NFR BLD AUTO: 0.6 % (ref 0–1.5)
DEPRECATED RDW RBC AUTO: 47.8 FL (ref 37–54)
EOSINOPHIL # BLD AUTO: 1.06 10*3/MM3 (ref 0–0.4)
EOSINOPHIL NFR BLD AUTO: 16.6 % (ref 0.3–6.2)
ERYTHROCYTE [DISTWIDTH] IN BLOOD BY AUTOMATED COUNT: 15.4 % (ref 12.3–15.4)
HCT VFR BLD AUTO: 22 % (ref 34–46.6)
HGB BLD-MCNC: 5.9 G/DL (ref 12–15.9)
IMM GRANULOCYTES # BLD AUTO: 0.01 10*3/MM3 (ref 0–0.05)
IMM GRANULOCYTES NFR BLD AUTO: 0.2 % (ref 0–0.5)
LYMPHOCYTES # BLD AUTO: 1.55 10*3/MM3 (ref 0.7–3.1)
LYMPHOCYTES NFR BLD AUTO: 24.3 % (ref 19.6–45.3)
MCH RBC QN AUTO: 23 PG (ref 26.6–33)
MCHC RBC AUTO-ENTMCNC: 26.8 G/DL (ref 31.5–35.7)
MCV RBC AUTO: 85.9 FL (ref 79–97)
MONOCYTES # BLD AUTO: 0.65 10*3/MM3 (ref 0.1–0.9)
MONOCYTES NFR BLD AUTO: 10.2 % (ref 5–12)
NEUTROPHILS # BLD AUTO: 3.07 10*3/MM3 (ref 1.7–7)
NEUTROPHILS NFR BLD AUTO: 48.1 % (ref 42.7–76)
NRBC BLD AUTO-RTO: 0 /100 WBC (ref 0–0.2)
PLATELET # BLD AUTO: 514 10*3/MM3 (ref 140–450)
PMV BLD AUTO: 9.8 FL (ref 6–12)
RBC # BLD AUTO: 2.56 10*6/MM3 (ref 3.77–5.28)
WBC NRBC COR # BLD: 6.38 10*3/MM3 (ref 3.4–10.8)

## 2019-09-27 PROCEDURE — 85025 COMPLETE CBC W/AUTO DIFF WBC: CPT

## 2019-09-28 ENCOUNTER — HOSPITAL ENCOUNTER (EMERGENCY)
Facility: HOSPITAL | Age: 74
Discharge: NURSING, REHAB OR LONG-TERM CARE | End: 2019-09-28
Attending: EMERGENCY MEDICINE
Payer: MEDICARE

## 2019-09-28 ENCOUNTER — APPOINTMENT (OUTPATIENT)
Dept: CT IMAGING | Facility: HOSPITAL | Age: 74
End: 2019-09-28
Payer: MEDICARE

## 2019-09-28 VITALS
DIASTOLIC BLOOD PRESSURE: 77 MMHG | SYSTOLIC BLOOD PRESSURE: 125 MMHG | OXYGEN SATURATION: 97 % | HEART RATE: 110 BPM | RESPIRATION RATE: 18 BRPM | WEIGHT: 104 LBS | BODY MASS INDEX: 18.43 KG/M2 | TEMPERATURE: 98.2 F | HEIGHT: 63 IN

## 2019-09-28 DIAGNOSIS — K29.51 GASTROINTESTINAL HEMORRHAGE ASSOCIATED WITH CHRONIC GASTRITIS: ICD-10-CM

## 2019-09-28 DIAGNOSIS — D64.9 LOW HEMOGLOBIN: Primary | ICD-10-CM

## 2019-09-28 DIAGNOSIS — N30.01 ACUTE CYSTITIS WITH HEMATURIA: ICD-10-CM

## 2019-09-28 LAB
A/G RATIO: 0.9 (ref 0.8–2)
ABO/RH: NORMAL
ALBUMIN SERPL-MCNC: 3.3 G/DL (ref 3.4–4.8)
ALP BLD-CCNC: 100 U/L (ref 25–100)
ALT SERPL-CCNC: 13 U/L (ref 4–36)
ANION GAP SERPL CALCULATED.3IONS-SCNC: 12 MMOL/L (ref 3–16)
ANTIBODY SCREEN: NORMAL
AST SERPL-CCNC: 19 U/L (ref 8–33)
BACTERIA: ABNORMAL /HPF
BASOPHILS ABSOLUTE: 0.1 K/UL (ref 0–0.1)
BASOPHILS RELATIVE PERCENT: 0.8 %
BILIRUB SERPL-MCNC: <0.2 MG/DL (ref 0.3–1.2)
BILIRUBIN URINE: NEGATIVE
BLOOD BANK DISPENSE STATUS: NORMAL
BLOOD BANK PRODUCT CODE: NORMAL
BLOOD, URINE: NEGATIVE
BPU ID: NORMAL
BUN BLDV-MCNC: 26 MG/DL (ref 6–20)
CALCIUM SERPL-MCNC: 8.9 MG/DL (ref 8.5–10.5)
CHLORIDE BLD-SCNC: 100 MMOL/L (ref 98–107)
CLARITY: CLEAR
CO2: 28 MMOL/L (ref 20–30)
COLOR: YELLOW
COMPONENT: NORMAL
CREAT SERPL-MCNC: 0.6 MG/DL (ref 0.4–1.2)
DONOR TYPE/RH: NORMAL
EOSINOPHILS ABSOLUTE: 0.5 K/UL (ref 0–0.4)
EOSINOPHILS RELATIVE PERCENT: 7.4 %
EPITHELIAL CELLS, UA: ABNORMAL /HPF
GFR AFRICAN AMERICAN: >59
GFR NON-AFRICAN AMERICAN: >60
GLOBULIN: 3.5 G/DL
GLUCOSE BLD-MCNC: 155 MG/DL (ref 74–106)
GLUCOSE URINE: NEGATIVE MG/DL
HCT VFR BLD CALC: 20.7 % (ref 37–47)
HEMOGLOBIN: 5.7 G/DL (ref 11.5–16.5)
HOLLISTER NO: NORMAL
IMMATURE GRANULOCYTES #: 0 K/UL
IMMATURE GRANULOCYTES %: 0.2 % (ref 0–5)
KETONES, URINE: NEGATIVE MG/DL
LEUKOCYTE ESTERASE, URINE: ABNORMAL
LYMPHOCYTES ABSOLUTE: 0.9 K/UL (ref 1.5–4)
LYMPHOCYTES RELATIVE PERCENT: 15.2 %
MCH RBC QN AUTO: 23.2 PG (ref 27–32)
MCHC RBC AUTO-ENTMCNC: 27.5 G/DL (ref 31–35)
MCV RBC AUTO: 84.1 FL (ref 80–100)
MICROSCOPIC EXAMINATION: YES
MONOCYTES ABSOLUTE: 0.5 K/UL (ref 0.2–0.8)
MONOCYTES RELATIVE PERCENT: 8.4 %
NEUTROPHILS ABSOLUTE: 4.1 K/UL (ref 2–7.5)
NEUTROPHILS RELATIVE PERCENT: 68 %
NITRITE, URINE: NEGATIVE
PDW BLD-RTO: 15.7 % (ref 11–16)
PH UA: 7 (ref 5–8)
PLATELET # BLD: 488 K/UL (ref 150–400)
PMV BLD AUTO: 9.7 FL (ref 6–10)
POTASSIUM REFLEX MAGNESIUM: 4.1 MMOL/L (ref 3.4–5.1)
PROTEIN UA: ABNORMAL MG/DL
RBC # BLD: 2.46 M/UL (ref 3.8–5.8)
RBC UA: ABNORMAL /HPF (ref 0–2)
SODIUM BLD-SCNC: 140 MMOL/L (ref 136–145)
SPECIFIC GRAVITY UA: 1.01 (ref 1–1.03)
TOTAL PROTEIN: 6.8 G/DL (ref 6.4–8.3)
URINE REFLEX TO CULTURE: YES
URINE TYPE: ABNORMAL
UROBILINOGEN, URINE: 0.2 E.U./DL
WBC # BLD: 6.1 K/UL (ref 4–11)
WBC UA: ABNORMAL /HPF (ref 0–5)

## 2019-09-28 PROCEDURE — 87077 CULTURE AEROBIC IDENTIFY: CPT

## 2019-09-28 PROCEDURE — 74176 CT ABD & PELVIS W/O CONTRAST: CPT

## 2019-09-28 PROCEDURE — 6370000000 HC RX 637 (ALT 250 FOR IP): Performed by: EMERGENCY MEDICINE

## 2019-09-28 PROCEDURE — 86900 BLOOD TYPING SEROLOGIC ABO: CPT

## 2019-09-28 PROCEDURE — 96361 HYDRATE IV INFUSION ADD-ON: CPT

## 2019-09-28 PROCEDURE — G0328 FECAL BLOOD SCRN IMMUNOASSAY: HCPCS

## 2019-09-28 PROCEDURE — 86850 RBC ANTIBODY SCREEN: CPT

## 2019-09-28 PROCEDURE — 96374 THER/PROPH/DIAG INJ IV PUSH: CPT

## 2019-09-28 PROCEDURE — 80053 COMPREHEN METABOLIC PANEL: CPT

## 2019-09-28 PROCEDURE — 87186 SC STD MICRODIL/AGAR DIL: CPT

## 2019-09-28 PROCEDURE — 2580000003 HC RX 258: Performed by: EMERGENCY MEDICINE

## 2019-09-28 PROCEDURE — P9016 RBC LEUKOCYTES REDUCED: HCPCS

## 2019-09-28 PROCEDURE — 93005 ELECTROCARDIOGRAM TRACING: CPT

## 2019-09-28 PROCEDURE — 81001 URINALYSIS AUTO W/SCOPE: CPT

## 2019-09-28 PROCEDURE — 86920 COMPATIBILITY TEST SPIN: CPT

## 2019-09-28 PROCEDURE — 86901 BLOOD TYPING SEROLOGIC RH(D): CPT

## 2019-09-28 PROCEDURE — 6360000002 HC RX W HCPCS: Performed by: EMERGENCY MEDICINE

## 2019-09-28 PROCEDURE — 36415 COLL VENOUS BLD VENIPUNCTURE: CPT

## 2019-09-28 PROCEDURE — 85025 COMPLETE CBC W/AUTO DIFF WBC: CPT

## 2019-09-28 PROCEDURE — 99283 EMERGENCY DEPT VISIT LOW MDM: CPT

## 2019-09-28 PROCEDURE — 87086 URINE CULTURE/COLONY COUNT: CPT

## 2019-09-28 PROCEDURE — 36430 TRANSFUSION BLD/BLD COMPNT: CPT

## 2019-09-28 RX ORDER — ACETAMINOPHEN 325 MG/1
650 TABLET ORAL ONCE
Status: COMPLETED | OUTPATIENT
Start: 2019-09-28 | End: 2019-09-28

## 2019-09-28 RX ORDER — DIPHENHYDRAMINE HCL 25 MG
25 TABLET ORAL ONCE
Status: COMPLETED | OUTPATIENT
Start: 2019-09-28 | End: 2019-09-28

## 2019-09-28 RX ORDER — 0.9 % SODIUM CHLORIDE 0.9 %
250 INTRAVENOUS SOLUTION INTRAVENOUS ONCE
Status: COMPLETED | OUTPATIENT
Start: 2019-09-28 | End: 2019-09-28

## 2019-09-28 RX ORDER — LORAZEPAM 2 MG/ML
1 INJECTION INTRAMUSCULAR ONCE
Status: COMPLETED | OUTPATIENT
Start: 2019-09-28 | End: 2019-09-28

## 2019-09-28 RX ORDER — DIPHENHYDRAMINE HCL 25 MG
CAPSULE ORAL
Status: DISCONTINUED
Start: 2019-09-28 | End: 2019-09-28 | Stop reason: HOSPADM

## 2019-09-28 RX ORDER — CALCIUM POLYCARBOPHIL 625 MG 625 MG/1
625 TABLET ORAL 2 TIMES DAILY
COMMUNITY

## 2019-09-28 RX ORDER — CIPROFLOXACIN 500 MG/1
500 TABLET, FILM COATED ORAL 2 TIMES DAILY
COMMUNITY
End: 2019-10-27

## 2019-09-28 RX ORDER — VENLAFAXINE 25 MG/1
25 TABLET ORAL DAILY
COMMUNITY
End: 2019-10-27

## 2019-09-28 RX ORDER — VENLAFAXINE 100 MG/1
200 TABLET ORAL EVERY MORNING
COMMUNITY

## 2019-09-28 RX ADMIN — DIPHENHYDRAMINE HYDROCHLORIDE 25 MG: 25 CAPSULE ORAL at 12:37

## 2019-09-28 RX ADMIN — LORAZEPAM 1 MG: 2 INJECTION INTRAMUSCULAR; INTRAVENOUS at 11:45

## 2019-09-28 RX ADMIN — SODIUM CHLORIDE 250 ML: 9 INJECTION, SOLUTION INTRAVENOUS at 12:41

## 2019-09-28 RX ADMIN — ACETAMINOPHEN 650 MG: 325 TABLET, FILM COATED ORAL at 12:36

## 2019-09-28 ASSESSMENT — PAIN DESCRIPTION - PAIN TYPE: TYPE: ACUTE PAIN

## 2019-09-28 ASSESSMENT — ENCOUNTER SYMPTOMS
SHORTNESS OF BREATH: 0
TROUBLE SWALLOWING: 0
EYE PAIN: 0
EYE REDNESS: 0
WHEEZING: 0
NAUSEA: 0
DIARRHEA: 0
RHINORRHEA: 0
BACK PAIN: 0
CONSTIPATION: 0
VOMITING: 0
SINUS PRESSURE: 0
SORE THROAT: 0
CHEST TIGHTNESS: 0
ABDOMINAL PAIN: 0
EYE DISCHARGE: 0
COUGH: 0

## 2019-09-28 ASSESSMENT — PAIN SCALES - GENERAL
PAINLEVEL_OUTOF10: 9
PAINLEVEL_OUTOF10: 9

## 2019-09-28 ASSESSMENT — PAIN DESCRIPTION - FREQUENCY: FREQUENCY: CONTINUOUS

## 2019-09-28 ASSESSMENT — PAIN DESCRIPTION - ORIENTATION: ORIENTATION: LOWER

## 2019-09-28 ASSESSMENT — PAIN DESCRIPTION - LOCATION: LOCATION: BACK

## 2019-09-30 LAB
ORGANISM: ABNORMAL
URINE CULTURE, ROUTINE: ABNORMAL

## 2019-10-27 ENCOUNTER — APPOINTMENT (OUTPATIENT)
Dept: GENERAL RADIOLOGY | Facility: HOSPITAL | Age: 74
End: 2019-10-27
Payer: MEDICARE

## 2019-10-27 ENCOUNTER — HOSPITAL ENCOUNTER (EMERGENCY)
Facility: HOSPITAL | Age: 74
Discharge: HOME | End: 2019-10-28
Attending: EMERGENCY MEDICINE
Payer: MEDICARE

## 2019-10-27 ENCOUNTER — APPOINTMENT (OUTPATIENT)
Dept: CT IMAGING | Facility: HOSPITAL | Age: 74
End: 2019-10-27
Payer: MEDICARE

## 2019-10-27 DIAGNOSIS — E86.0 DEHYDRATION: ICD-10-CM

## 2019-10-27 DIAGNOSIS — R11.2 NON-INTRACTABLE VOMITING WITH NAUSEA, UNSPECIFIED VOMITING TYPE: Primary | ICD-10-CM

## 2019-10-27 LAB
A/G RATIO: 0.9 (ref 0.8–2)
ALBUMIN SERPL-MCNC: 3.6 G/DL (ref 3.4–4.8)
ALP BLD-CCNC: 164 U/L (ref 25–100)
ALT SERPL-CCNC: 21 U/L (ref 4–36)
AMORPHOUS: ABNORMAL /HPF
ANION GAP SERPL CALCULATED.3IONS-SCNC: 17 MMOL/L (ref 3–16)
AST SERPL-CCNC: 25 U/L (ref 8–33)
BASOPHILS ABSOLUTE: 0 K/UL (ref 0–0.1)
BASOPHILS RELATIVE PERCENT: 0.6 %
BILIRUB SERPL-MCNC: 0.3 MG/DL (ref 0.3–1.2)
BILIRUBIN URINE: NEGATIVE
BLOOD, URINE: NEGATIVE
BUN BLDV-MCNC: 32 MG/DL (ref 6–20)
CALCIUM SERPL-MCNC: 9.6 MG/DL (ref 8.5–10.5)
CHLORIDE BLD-SCNC: 92 MMOL/L (ref 98–107)
CLARITY: ABNORMAL
CO2: 28 MMOL/L (ref 20–30)
COLOR: YELLOW
CREAT SERPL-MCNC: 0.6 MG/DL (ref 0.4–1.2)
EOSINOPHILS ABSOLUTE: 0 K/UL (ref 0–0.4)
EOSINOPHILS RELATIVE PERCENT: 0 %
EPITHELIAL CELLS, UA: ABNORMAL /HPF
GFR AFRICAN AMERICAN: >59
GFR NON-AFRICAN AMERICAN: >60
GLOBULIN: 4.2 G/DL
GLUCOSE BLD-MCNC: 246 MG/DL (ref 74–106)
GLUCOSE URINE: NEGATIVE MG/DL
HCT VFR BLD CALC: 30.3 % (ref 37–47)
HEMOGLOBIN: 9.5 G/DL (ref 11.5–16.5)
IMMATURE GRANULOCYTES #: 0 K/UL
IMMATURE GRANULOCYTES %: 0.2 % (ref 0–5)
KETONES, URINE: NEGATIVE MG/DL
LACTIC ACID: 2.7 MMOL/L (ref 0.4–2)
LEUKOCYTE ESTERASE, URINE: ABNORMAL
LIPASE: 33 U/L (ref 5.6–51.3)
LYMPHOCYTES ABSOLUTE: 0.7 K/UL (ref 1.5–4)
LYMPHOCYTES RELATIVE PERCENT: 10.9 %
MCH RBC QN AUTO: 25.5 PG (ref 27–32)
MCHC RBC AUTO-ENTMCNC: 31.4 G/DL (ref 31–35)
MCV RBC AUTO: 81.5 FL (ref 80–100)
MICROSCOPIC EXAMINATION: YES
MONOCYTES ABSOLUTE: 0.4 K/UL (ref 0.2–0.8)
MONOCYTES RELATIVE PERCENT: 6.5 %
MUCUS: ABNORMAL /LPF
NEUTROPHILS ABSOLUTE: 5.3 K/UL (ref 2–7.5)
NEUTROPHILS RELATIVE PERCENT: 81.8 %
NITRITE, URINE: NEGATIVE
OCCULT BLOOD DIAGNOSTIC: NORMAL
PDW BLD-RTO: 17.2 % (ref 11–16)
PH UA: 7 (ref 5–8)
PLATELET # BLD: 501 K/UL (ref 150–400)
PMV BLD AUTO: 10.1 FL (ref 6–10)
POTASSIUM SERPL-SCNC: 3.2 MMOL/L (ref 3.4–5.1)
PRO-BNP: 260 PG/ML (ref 0–1800)
PROTEIN UA: 100 MG/DL
RAPID INFLUENZA  B AGN: NEGATIVE
RAPID INFLUENZA A AGN: NEGATIVE
RBC # BLD: 3.72 M/UL (ref 3.8–5.8)
RBC UA: ABNORMAL /HPF (ref 0–2)
SODIUM BLD-SCNC: 137 MMOL/L (ref 136–145)
SPECIFIC GRAVITY UA: 1.01 (ref 1–1.03)
TOTAL PROTEIN: 7.8 G/DL (ref 6.4–8.3)
TROPONIN: <0.3 NG/ML
URINE REFLEX TO CULTURE: YES
URINE TYPE: ABNORMAL
UROBILINOGEN, URINE: 0.2 E.U./DL
WBC # BLD: 6.5 K/UL (ref 4–11)
WBC UA: ABNORMAL /HPF (ref 0–5)

## 2019-10-27 PROCEDURE — 71045 X-RAY EXAM CHEST 1 VIEW: CPT

## 2019-10-27 PROCEDURE — 74176 CT ABD & PELVIS W/O CONTRAST: CPT

## 2019-10-27 PROCEDURE — 6360000002 HC RX W HCPCS: Performed by: EMERGENCY MEDICINE

## 2019-10-27 PROCEDURE — 87086 URINE CULTURE/COLONY COUNT: CPT

## 2019-10-27 PROCEDURE — 2580000003 HC RX 258: Performed by: EMERGENCY MEDICINE

## 2019-10-27 PROCEDURE — 36415 COLL VENOUS BLD VENIPUNCTURE: CPT

## 2019-10-27 PROCEDURE — 87186 SC STD MICRODIL/AGAR DIL: CPT

## 2019-10-27 PROCEDURE — 84484 ASSAY OF TROPONIN QUANT: CPT

## 2019-10-27 PROCEDURE — 99284 EMERGENCY DEPT VISIT MOD MDM: CPT

## 2019-10-27 PROCEDURE — 93005 ELECTROCARDIOGRAM TRACING: CPT

## 2019-10-27 PROCEDURE — 87804 INFLUENZA ASSAY W/OPTIC: CPT

## 2019-10-27 PROCEDURE — 80053 COMPREHEN METABOLIC PANEL: CPT

## 2019-10-27 PROCEDURE — 83880 ASSAY OF NATRIURETIC PEPTIDE: CPT

## 2019-10-27 PROCEDURE — 83690 ASSAY OF LIPASE: CPT

## 2019-10-27 PROCEDURE — G0328 FECAL BLOOD SCRN IMMUNOASSAY: HCPCS

## 2019-10-27 PROCEDURE — 83605 ASSAY OF LACTIC ACID: CPT

## 2019-10-27 PROCEDURE — 87040 BLOOD CULTURE FOR BACTERIA: CPT

## 2019-10-27 PROCEDURE — 96374 THER/PROPH/DIAG INJ IV PUSH: CPT

## 2019-10-27 PROCEDURE — 81001 URINALYSIS AUTO W/SCOPE: CPT

## 2019-10-27 PROCEDURE — 85025 COMPLETE CBC W/AUTO DIFF WBC: CPT

## 2019-10-27 PROCEDURE — 96361 HYDRATE IV INFUSION ADD-ON: CPT

## 2019-10-27 PROCEDURE — 87077 CULTURE AEROBIC IDENTIFY: CPT

## 2019-10-27 RX ORDER — PANTOPRAZOLE SODIUM 40 MG/1
40 GRANULE, DELAYED RELEASE ORAL
COMMUNITY

## 2019-10-27 RX ORDER — ASCORBIC ACID 500 MG
500 TABLET ORAL DAILY
Status: ON HOLD | COMMUNITY
End: 2020-10-12

## 2019-10-27 RX ORDER — NIFEDIPINE 30 MG/1
30 TABLET, FILM COATED, EXTENDED RELEASE ORAL DAILY
Status: ON HOLD | COMMUNITY
End: 2020-10-16 | Stop reason: HOSPADM

## 2019-10-27 RX ORDER — ONDANSETRON 2 MG/ML
4 INJECTION INTRAMUSCULAR; INTRAVENOUS ONCE
Status: COMPLETED | OUTPATIENT
Start: 2019-10-27 | End: 2019-10-27

## 2019-10-27 RX ORDER — ONDANSETRON 4 MG/1
4 TABLET, ORALLY DISINTEGRATING ORAL EVERY 6 HOURS PRN
Status: ON HOLD | COMMUNITY
End: 2020-01-29

## 2019-10-27 RX ORDER — SENNA PLUS 8.6 MG/1
2 TABLET ORAL DAILY PRN
COMMUNITY

## 2019-10-27 RX ORDER — 0.9 % SODIUM CHLORIDE 0.9 %
1000 INTRAVENOUS SOLUTION INTRAVENOUS ONCE
Status: COMPLETED | OUTPATIENT
Start: 2019-10-27 | End: 2019-10-28

## 2019-10-27 RX ADMIN — ONDANSETRON 4 MG: 2 INJECTION INTRAMUSCULAR; INTRAVENOUS at 20:06

## 2019-10-27 RX ADMIN — SODIUM CHLORIDE 1000 ML: 9 INJECTION, SOLUTION INTRAVENOUS at 20:05

## 2019-10-27 ASSESSMENT — PAIN DESCRIPTION - PAIN TYPE: TYPE: CHRONIC PAIN

## 2019-10-27 ASSESSMENT — PAIN DESCRIPTION - FREQUENCY: FREQUENCY: CONTINUOUS

## 2019-10-27 ASSESSMENT — PAIN SCALES - GENERAL: PAINLEVEL_OUTOF10: 10

## 2019-10-27 ASSESSMENT — PAIN DESCRIPTION - LOCATION: LOCATION: BACK;HEAD

## 2019-10-28 VITALS
TEMPERATURE: 99.6 F | SYSTOLIC BLOOD PRESSURE: 115 MMHG | HEART RATE: 116 BPM | WEIGHT: 104 LBS | OXYGEN SATURATION: 97 % | HEIGHT: 63 IN | BODY MASS INDEX: 18.43 KG/M2 | DIASTOLIC BLOOD PRESSURE: 62 MMHG | RESPIRATION RATE: 29 BRPM

## 2019-10-28 PROCEDURE — 96361 HYDRATE IV INFUSION ADD-ON: CPT

## 2019-10-31 LAB
ORGANISM: ABNORMAL
URINE CULTURE, ROUTINE: ABNORMAL

## 2019-11-02 ENCOUNTER — LAB REQUISITION (OUTPATIENT)
Dept: LAB | Facility: HOSPITAL | Age: 74
End: 2019-11-02

## 2019-11-02 DIAGNOSIS — Z00.00 ROUTINE GENERAL MEDICAL EXAMINATION AT A HEALTH CARE FACILITY: ICD-10-CM

## 2019-11-02 LAB
BACTERIA UR QL AUTO: ABNORMAL /HPF
BILIRUB UR QL STRIP: NEGATIVE
BLOOD CULTURE, ROUTINE: NORMAL
CLARITY UR: ABNORMAL
COLOR UR: YELLOW
CULTURE, BLOOD 2: NORMAL
GLUCOSE UR STRIP-MCNC: NEGATIVE MG/DL
HGB UR QL STRIP.AUTO: NEGATIVE
HYALINE CASTS UR QL AUTO: ABNORMAL /LPF
KETONES UR QL STRIP: NEGATIVE
LEUKOCYTE ESTERASE UR QL STRIP.AUTO: ABNORMAL
NITRITE UR QL STRIP: NEGATIVE
PH UR STRIP.AUTO: 8 [PH] (ref 5–8)
PROT UR QL STRIP: ABNORMAL
RBC # UR: ABNORMAL /HPF
REF LAB TEST METHOD: ABNORMAL
SP GR UR STRIP: 1.02 (ref 1–1.03)
SQUAMOUS #/AREA URNS HPF: ABNORMAL /HPF
UROBILINOGEN UR QL STRIP: ABNORMAL
WBC UR QL AUTO: ABNORMAL /HPF

## 2019-11-02 PROCEDURE — 81001 URINALYSIS AUTO W/SCOPE: CPT

## 2019-11-16 PROCEDURE — 81003 URINALYSIS AUTO W/O SCOPE: CPT

## 2019-11-17 ENCOUNTER — LAB REQUISITION (OUTPATIENT)
Dept: LAB | Facility: HOSPITAL | Age: 74
End: 2019-11-17

## 2019-11-17 DIAGNOSIS — Z00.00 ROUTINE GENERAL MEDICAL EXAMINATION AT A HEALTH CARE FACILITY: ICD-10-CM

## 2019-11-17 LAB
BILIRUB UR QL STRIP: NEGATIVE
CLARITY UR: CLEAR
COLOR UR: YELLOW
GLUCOSE UR STRIP-MCNC: NEGATIVE MG/DL
HGB UR QL STRIP.AUTO: NEGATIVE
KETONES UR QL STRIP: NEGATIVE
LEUKOCYTE ESTERASE UR QL STRIP.AUTO: NEGATIVE
NITRITE UR QL STRIP: NEGATIVE
PH UR STRIP.AUTO: <=5 [PH] (ref 5–8)
PROT UR QL STRIP: ABNORMAL
SP GR UR STRIP: 1.01 (ref 1–1.03)
UROBILINOGEN UR QL STRIP: ABNORMAL

## 2020-01-28 ENCOUNTER — HOSPITAL ENCOUNTER (INPATIENT)
Facility: HOSPITAL | Age: 75
LOS: 2 days | Discharge: NURSING, REHAB OR LONG-TERM CARE | DRG: 812 | End: 2020-01-30
Attending: EMERGENCY MEDICINE | Admitting: INTERNAL MEDICINE
Payer: MEDICARE

## 2020-01-28 LAB
A/G RATIO: 0.9 (ref 0.8–2)
ABO/RH: NORMAL
ALBUMIN SERPL-MCNC: 3.5 G/DL (ref 3.4–4.8)
ALP BLD-CCNC: 182 U/L (ref 25–100)
ALT SERPL-CCNC: 41 U/L (ref 4–36)
ANION GAP SERPL CALCULATED.3IONS-SCNC: 10 MMOL/L (ref 3–16)
ANTIBODY SCREEN: NORMAL
AST SERPL-CCNC: 25 U/L (ref 8–33)
BASOPHILS ABSOLUTE: 0 K/UL (ref 0–0.1)
BASOPHILS RELATIVE PERCENT: 0.3 %
BILIRUB SERPL-MCNC: 0.4 MG/DL (ref 0.3–1.2)
BLOOD BANK DISPENSE STATUS: NORMAL
BLOOD BANK DISPENSE STATUS: NORMAL
BLOOD BANK PRODUCT CODE: NORMAL
BLOOD BANK PRODUCT CODE: NORMAL
BPU ID: NORMAL
BPU ID: NORMAL
BUN BLDV-MCNC: 29 MG/DL (ref 6–20)
CALCIUM SERPL-MCNC: 9.1 MG/DL (ref 8.5–10.5)
CHLORIDE BLD-SCNC: 100 MMOL/L (ref 98–107)
CO2: 27 MMOL/L (ref 20–30)
COMPONENT: NORMAL
COMPONENT: NORMAL
CREAT SERPL-MCNC: 0.6 MG/DL (ref 0.4–1.2)
DONOR TYPE/RH: NORMAL
DONOR TYPE/RH: NORMAL
EOSINOPHILS ABSOLUTE: 0.4 K/UL (ref 0–0.4)
EOSINOPHILS RELATIVE PERCENT: 6.2 %
FERRITIN: 7.5 NG/ML (ref 22–322)
GFR AFRICAN AMERICAN: >59
GFR NON-AFRICAN AMERICAN: >60
GLOBULIN: 3.8 G/DL
GLUCOSE BLD-MCNC: 115 MG/DL (ref 74–106)
HCT VFR BLD CALC: 21.9 % (ref 37–47)
HEMOGLOBIN: 6.5 G/DL (ref 11.5–16.5)
HOLLISTER NO: NORMAL
HOLLISTER NO: NORMAL
IMMATURE GRANULOCYTES #: 0 K/UL
IMMATURE GRANULOCYTES %: 0.1 % (ref 0–5)
IRON SATURATION: 18 % (ref 15–50)
IRON: 86 UG/DL (ref 37–145)
LIPASE: 37 U/L (ref 5.6–51.3)
LYMPHOCYTES ABSOLUTE: 1.7 K/UL (ref 1.5–4)
LYMPHOCYTES RELATIVE PERCENT: 23.7 %
MCH RBC QN AUTO: 22.5 PG (ref 27–32)
MCHC RBC AUTO-ENTMCNC: 29.7 G/DL (ref 31–35)
MCV RBC AUTO: 75.8 FL (ref 80–100)
MONOCYTES ABSOLUTE: 0.7 K/UL (ref 0.2–0.8)
MONOCYTES RELATIVE PERCENT: 9.9 %
NEUTROPHILS ABSOLUTE: 4.2 K/UL (ref 2–7.5)
NEUTROPHILS RELATIVE PERCENT: 59.8 %
OCCULT BLOOD DIAGNOSTIC: ABNORMAL
PDW BLD-RTO: 15.4 % (ref 11–16)
PLATELET # BLD: 380 K/UL (ref 150–400)
PMV BLD AUTO: 10 FL (ref 6–10)
POTASSIUM SERPL-SCNC: 3.6 MMOL/L (ref 3.4–5.1)
RBC # BLD: 2.89 M/UL (ref 3.8–5.8)
SODIUM BLD-SCNC: 137 MMOL/L (ref 136–145)
TOTAL IRON BINDING CAPACITY: 470 UG/DL (ref 250–450)
TOTAL PROTEIN: 7.3 G/DL (ref 6.4–8.3)
WBC # BLD: 7 K/UL (ref 4–11)

## 2020-01-28 PROCEDURE — 83550 IRON BINDING TEST: CPT

## 2020-01-28 PROCEDURE — 6370000000 HC RX 637 (ALT 250 FOR IP): Performed by: PHYSICIAN ASSISTANT

## 2020-01-28 PROCEDURE — 2580000003 HC RX 258: Performed by: PHYSICIAN ASSISTANT

## 2020-01-28 PROCEDURE — 99284 EMERGENCY DEPT VISIT MOD MDM: CPT

## 2020-01-28 PROCEDURE — 36415 COLL VENOUS BLD VENIPUNCTURE: CPT

## 2020-01-28 PROCEDURE — 86850 RBC ANTIBODY SCREEN: CPT

## 2020-01-28 PROCEDURE — 83690 ASSAY OF LIPASE: CPT

## 2020-01-28 PROCEDURE — 86900 BLOOD TYPING SEROLOGIC ABO: CPT

## 2020-01-28 PROCEDURE — 86920 COMPATIBILITY TEST SPIN: CPT

## 2020-01-28 PROCEDURE — 86901 BLOOD TYPING SEROLOGIC RH(D): CPT

## 2020-01-28 PROCEDURE — 82728 ASSAY OF FERRITIN: CPT

## 2020-01-28 PROCEDURE — G0328 FECAL BLOOD SCRN IMMUNOASSAY: HCPCS

## 2020-01-28 PROCEDURE — 85025 COMPLETE CBC W/AUTO DIFF WBC: CPT

## 2020-01-28 PROCEDURE — 2500000003 HC RX 250 WO HCPCS: Performed by: PHYSICIAN ASSISTANT

## 2020-01-28 PROCEDURE — 80053 COMPREHEN METABOLIC PANEL: CPT

## 2020-01-28 PROCEDURE — 36430 TRANSFUSION BLD/BLD COMPNT: CPT

## 2020-01-28 PROCEDURE — P9016 RBC LEUKOCYTES REDUCED: HCPCS

## 2020-01-28 PROCEDURE — 1200000000 HC SEMI PRIVATE

## 2020-01-28 PROCEDURE — 83540 ASSAY OF IRON: CPT

## 2020-01-28 RX ORDER — TOPIRAMATE 25 MG/1
25 TABLET ORAL 2 TIMES DAILY
COMMUNITY

## 2020-01-28 RX ORDER — 0.9 % SODIUM CHLORIDE 0.9 %
20 INTRAVENOUS SOLUTION INTRAVENOUS ONCE
Status: COMPLETED | OUTPATIENT
Start: 2020-01-28 | End: 2020-01-29

## 2020-01-28 RX ORDER — ACETAMINOPHEN 650 MG/1
650 SUPPOSITORY RECTAL EVERY 6 HOURS PRN
Status: DISCONTINUED | OUTPATIENT
Start: 2020-01-28 | End: 2020-01-29

## 2020-01-28 RX ORDER — SENNA PLUS 8.6 MG/1
2 TABLET ORAL NIGHTLY
Status: DISCONTINUED | OUTPATIENT
Start: 2020-01-28 | End: 2020-01-29

## 2020-01-28 RX ORDER — ACYCLOVIR 800 MG/1
800 TABLET ORAL
COMMUNITY
Start: 2020-01-27 | End: 2020-02-04

## 2020-01-28 RX ORDER — DONEPEZIL HYDROCHLORIDE 5 MG/1
10 TABLET, FILM COATED ORAL NIGHTLY
Status: DISCONTINUED | OUTPATIENT
Start: 2020-01-28 | End: 2020-01-30 | Stop reason: HOSPADM

## 2020-01-28 RX ORDER — SUCRALFATE 1 G/1
1 TABLET ORAL
Status: DISCONTINUED | OUTPATIENT
Start: 2020-01-28 | End: 2020-01-30 | Stop reason: HOSPADM

## 2020-01-28 RX ORDER — ACETAMINOPHEN 325 MG/1
650 TABLET ORAL EVERY 4 HOURS PRN
Status: DISCONTINUED | OUTPATIENT
Start: 2020-01-28 | End: 2020-01-30 | Stop reason: HOSPADM

## 2020-01-28 RX ORDER — FLUCONAZOLE 150 MG/1
150 TABLET ORAL DAILY
Status: ON HOLD | COMMUNITY
Start: 2020-01-25 | End: 2020-01-29 | Stop reason: HOSPADM

## 2020-01-28 RX ORDER — ZINC SULFATE 50(220)MG
220 CAPSULE ORAL DAILY
Status: DISCONTINUED | OUTPATIENT
Start: 2020-01-28 | End: 2020-01-30 | Stop reason: HOSPADM

## 2020-01-28 RX ORDER — NIFEDIPINE 30 MG/1
30 TABLET, EXTENDED RELEASE ORAL DAILY
Status: DISCONTINUED | OUTPATIENT
Start: 2020-01-28 | End: 2020-01-28

## 2020-01-28 RX ORDER — ACYCLOVIR 800 MG/1
800 TABLET ORAL
Status: DISCONTINUED | OUTPATIENT
Start: 2020-01-28 | End: 2020-01-30 | Stop reason: HOSPADM

## 2020-01-28 RX ORDER — ONDANSETRON 2 MG/ML
4 INJECTION INTRAMUSCULAR; INTRAVENOUS EVERY 6 HOURS PRN
Status: DISCONTINUED | OUTPATIENT
Start: 2020-01-28 | End: 2020-01-30 | Stop reason: HOSPADM

## 2020-01-28 RX ORDER — TOPIRAMATE 25 MG/1
25 TABLET ORAL 2 TIMES DAILY
Status: DISCONTINUED | OUTPATIENT
Start: 2020-01-28 | End: 2020-01-30 | Stop reason: HOSPADM

## 2020-01-28 RX ORDER — CYPROHEPTADINE HYDROCHLORIDE 4 MG/1
4 TABLET ORAL 3 TIMES DAILY
Status: DISCONTINUED | OUTPATIENT
Start: 2020-01-28 | End: 2020-01-30 | Stop reason: HOSPADM

## 2020-01-28 RX ORDER — NYSTATIN 100000 [USP'U]/G
POWDER TOPICAL 2 TIMES DAILY
COMMUNITY
Start: 2020-01-26 | End: 2020-02-01

## 2020-01-28 RX ORDER — M-VIT,TX,IRON,MINS/CALC/FOLIC 27MG-0.4MG
1 TABLET ORAL DAILY
Status: DISCONTINUED | OUTPATIENT
Start: 2020-01-28 | End: 2020-01-30 | Stop reason: HOSPADM

## 2020-01-28 RX ORDER — BISACODYL 10 MG
10 SUPPOSITORY, RECTAL RECTAL DAILY PRN
Status: DISCONTINUED | OUTPATIENT
Start: 2020-01-28 | End: 2020-01-30 | Stop reason: HOSPADM

## 2020-01-28 RX ORDER — MORPHINE SULFATE 15 MG/1
15 TABLET, FILM COATED, EXTENDED RELEASE ORAL 2 TIMES DAILY
Status: DISCONTINUED | OUTPATIENT
Start: 2020-01-28 | End: 2020-01-30 | Stop reason: HOSPADM

## 2020-01-28 RX ORDER — ASCORBIC ACID 500 MG
500 TABLET ORAL DAILY
Status: DISCONTINUED | OUTPATIENT
Start: 2020-01-28 | End: 2020-01-30 | Stop reason: HOSPADM

## 2020-01-28 RX ORDER — FERROUS SULFATE TAB EC 324 MG (65 MG FE EQUIVALENT) 324 (65 FE) MG
324 TABLET DELAYED RESPONSE ORAL
Status: DISCONTINUED | OUTPATIENT
Start: 2020-01-29 | End: 2020-01-30 | Stop reason: HOSPADM

## 2020-01-28 RX ORDER — BACLOFEN 10 MG/1
10 TABLET ORAL 3 TIMES DAILY
Status: DISCONTINUED | OUTPATIENT
Start: 2020-01-28 | End: 2020-01-29

## 2020-01-28 RX ORDER — PANTOPRAZOLE SODIUM 40 MG/1
40 TABLET, DELAYED RELEASE ORAL
Status: DISCONTINUED | OUTPATIENT
Start: 2020-01-29 | End: 2020-01-30 | Stop reason: HOSPADM

## 2020-01-28 RX ORDER — BACLOFEN 10 MG/1
10 TABLET ORAL 3 TIMES DAILY
Status: ON HOLD | COMMUNITY
End: 2020-01-29

## 2020-01-28 RX ORDER — SIMVASTATIN 20 MG
40 TABLET ORAL NIGHTLY
Status: DISCONTINUED | OUTPATIENT
Start: 2020-01-28 | End: 2020-01-30 | Stop reason: HOSPADM

## 2020-01-28 RX ORDER — CALCIUM POLYCARBOPHIL 625 MG 625 MG/1
1250 TABLET ORAL 2 TIMES DAILY
Status: DISCONTINUED | OUTPATIENT
Start: 2020-01-28 | End: 2020-01-29

## 2020-01-28 RX ADMIN — ACYCLOVIR 800 MG: 800 TABLET ORAL at 23:43

## 2020-01-28 RX ADMIN — CALCIUM POLYCARBOPHIL 625 MG TABLET 1250 MG: at 21:18

## 2020-01-28 RX ADMIN — SODIUM CHLORIDE 20 ML: 9 INJECTION, SOLUTION INTRAVENOUS at 21:19

## 2020-01-28 RX ADMIN — TOPIRAMATE 25 MG: 25 TABLET, FILM COATED ORAL at 21:18

## 2020-01-28 RX ADMIN — MICONAZOLE NITRATE: 20 POWDER TOPICAL at 21:24

## 2020-01-28 RX ADMIN — SIMVASTATIN 40 MG: 20 TABLET, FILM COATED ORAL at 21:17

## 2020-01-28 RX ADMIN — DONEPEZIL HYDROCHLORIDE 10 MG: 5 TABLET, FILM COATED ORAL at 21:19

## 2020-01-28 RX ADMIN — CYPROHEPTADINE HYDROCHLORIDE 4 MG: 4 TABLET ORAL at 21:17

## 2020-01-28 RX ADMIN — SUCRALFATE 1 G: 1 TABLET ORAL at 21:19

## 2020-01-28 RX ADMIN — MORPHINE SULFATE 15 MG: 15 TABLET, FILM COATED, EXTENDED RELEASE ORAL at 21:18

## 2020-01-28 RX ADMIN — BACLOFEN 10 MG: 10 TABLET ORAL at 21:18

## 2020-01-28 RX ADMIN — SENNOSIDES 17.2 MG: 8.6 TABLET, FILM COATED ORAL at 21:17

## 2020-01-28 ASSESSMENT — PAIN SCALES - GENERAL
PAINLEVEL_OUTOF10: 0
PAINLEVEL_OUTOF10: 0

## 2020-01-28 NOTE — ED PROVIDER NOTES
CHIEF COMPLAINT  Abnormal Lab (Hgb 6.3 per [facility])      HISTORY OF PRESENT ILLNESS  Genna Ochoa is a 66 y.o. female with a history of dementia, diabetes mellitus, GERD, and chronic anemia from GI bleed/iron deficiency who presents to the ED complaining of \"abnormal lab. \"Patient is alert and oriented but in a chronically debilitated state. She denies any complaints at this time. By report, she had outpatient testing with hemoglobin of approximately 6.5.. No other complaints, modifying factors or associated symptoms. I have reviewed the following from the nursing documentation. Past Medical History:   Diagnosis Date    Anemia     Anxiety     Blind     Narayan Bonnet syndrome     Chronic back pain     Constipation     D[facility] ([facility] [address]     Depression     Diabetes mellitus ([facility] [address]     Dysphagia     GERD (gastroesophageal reflux disease)     GI bleed     History of opioid abuse ([facility] [address]     Hyperlipidemia     Hypertension     Insomnia     Interstitial cystitis     Retinitis pigmentosa     UTI (urinary tract infection)     Vitamin D deficiency     Vitamin deficiency      Past Surgical History:   Procedure Laterality Date    APPENDECTOMY      BACK SURGERY      x2    BLADDER SURGERY      multiple bladder surgeries    BREAST SURGERY      lump removed    CARPAL TUNNEL RELEASE Bilateral     CHOLECYSTECTOMY      COLONOSCOPY N/A 7/8/2019    COLONOSCOPY POLYPECTOMY SNARE/COLD BIOPSY performed by Dorcas Motley MD at [address] [place] [address] Drive GASTROINTESTINAL ENDOSCOPY N/A 7/8/2019    EGD BIOPSY performed by Dorcas Motley MD at [place] [address] reviewed. No pertinent family history.   Social History     Socioeconomic History    Marital status:      Spouse name: Not on file    Number of children: Not on file    Years of education: Not on file    Highest education level: Not on file   Occupational History    Not on file Social Needs    Financial resource strain: Not on file    Food insecurity:     Worry: Not on file     Inability: Not on file    Transportation needs:     Medical: Not on file     Non-medical: Not on file   Tobacco Use    Smoking status: Never Smoker    Smokeless tobacco: Never Used   Substance and Sexual Activity    Alcohol use: No    Drug use: No    Sexual activity: Not on file   Lifestyle    Physical activity:     Days per week: Not on file     Minutes per session: Not on file    Stress: Not on file   Relationships    Social connections:     Talks on phone: Not on file     Gets together: Not on file     Attends Yazidi service: Not on file     Active member of club or organization: Not on file     Attends meetings of clubs or organizations: Not on file     Relationship status: Not on file    Intimate partner violence:     Fear of current or ex partner: Not on file     Emotionally abused: Not on file     Physically abused: Not on file     Forced sexual activity: Not on file   Other Topics Concern    Not on file   Social History Narrative    Not on file     No current facility-administered medications for this encounter. Current Outpatient Medications   Medication Sig Dispense Refill    topiramate (TOPAMAX) 25 MG tablet Take 25 mg by mouth 2 times daily      fluconazole (DIFLUCAN) 150 MG tablet Take 150 mg by mouth daily      acyclovir (ZOVIRAX) 800 MG tablet Take 800 mg by mouth 5 times daily      baclofen (LIORESAL) 10 MG tablet Take 10 mg by mouth 3 times daily      nystatin (MYCOSTATIN) 707432 UNIT/GM powder Apply topically 4 times daily Apply topically 4 times daily.       pantoprazole sodium (PROTONIX) 40 MG PACK packet Take 40 mg by mouth 2 times daily (before meals)      ascorbic acid (VITAMIN C) 500 MG tablet Take 500 mg by mouth daily      NIFEdipine (ADALAT CC) 30 MG extended release tablet Take 30 mg by mouth daily      polycarbophil (FIBERCON) 625 MG tablet Take 1,250 mg by mouth 2 times daily      venlafaxine (EFFEXOR) 100 MG tablet Take 200 mg by mouth daily      sucralfate (CARAFATE) 1 GM tablet Take 1 tablet by mouth 4 times daily 120 tablet 0    cyproheptadine (PERIACTIN) 4 MG tablet Take 4 mg by mouth 3 times daily      ferrous sulfate 325 (65 Fe) MG tablet Take 325 mg by mouth daily (with breakfast)      Zinc Chelated 50 MG TABS Take 1 each by mouth daily      Multiple Vitamins-Minerals (THERA M PLUS) TABS Take 1 each by mouth daily      morphine (MS CONTIN) 15 MG extended release tablet Take 15 mg by mouth 2 times daily. .      donepezil (ARICEPT) 10 MG tablet Take 10 mg by mouth nightly      simvastatin (ZOCOR) 40 MG tablet Take 40 mg by mouth nightly       senna (SENOKOT) 8.6 MG tablet Take 2 tablets by mouth nightly as needed for Constipation       ondansetron (ZOFRAN-ODT) 4 MG disintegrating tablet Take 4 mg by mouth every 6 hours as needed for Nausea or Vomiting      magnesium hydroxide (MILK OF MAGNESIA) 400 MG/5ML suspension Take 30 mLs by mouth every 36 hours      bisacodyl (DULCOLAX) 10 MG suppository Place 10 mg rectally daily as needed for Constipation      acetaminophen (TYLENOL) 650 MG suppository Place 650 mg rectally every 6 hours as needed for Fever       Allergies   Allergen Reactions    Aspirin Diarrhea    Ibuprofen Diarrhea    Thorazine [Chlorpromazine] Other (See Comments)     Patient became wild       REVIEW OF SYSTEMS  10 systems reviewed, pertinent positives per HPI otherwise noted to be negative. PHYSICAL EXAM  BP (!) 126/58   Pulse 89   SpO2 97%   GENERAL APPEARANCE: Awake and alert. Cooperative. No acute distress. HEAD: Normocephalic. Atraumatic. EYES: PERRL. EOM's grossly intact. ENT: Mucous membranes are moist.   NECK: Supple, trachea midline. HEART: RRR. Normal S1S2, no rubs, gallops, or murmurs noted  LUNGS: Respirations unlabored. CTAB. Good air exchange. No wheezes, rales, or rhonchi.   Speaking comfortably in full

## 2020-01-28 NOTE — ED NOTES
Spoke with Cheri Owens RN at [facility] at this time. She states patient was treated for shingles recently but areas on bottom did not improve. Pt was then treated for yeast but rash persisted. Pt is now on another round of Acyclovir for possible shingles. RN reports patients last bowel movement was 1/27/2020.       Boo Erazo RN  01/28/20 2723

## 2020-01-29 LAB
A/G RATIO: 0.8 (ref 0.8–2)
ALBUMIN SERPL-MCNC: 3.2 G/DL (ref 3.4–4.8)
ALP BLD-CCNC: 187 U/L (ref 25–100)
ALT SERPL-CCNC: 39 U/L (ref 4–36)
ANION GAP SERPL CALCULATED.3IONS-SCNC: 11 MMOL/L (ref 3–16)
AST SERPL-CCNC: 27 U/L (ref 8–33)
BILIRUB SERPL-MCNC: 0.5 MG/DL (ref 0.3–1.2)
BUN BLDV-MCNC: 27 MG/DL (ref 6–20)
CALCIUM SERPL-MCNC: 9 MG/DL (ref 8.5–10.5)
CHLORIDE BLD-SCNC: 102 MMOL/L (ref 98–107)
CO2: 25 MMOL/L (ref 20–30)
CREAT SERPL-MCNC: <0.5 MG/DL (ref 0.4–1.2)
GFR AFRICAN AMERICAN: >59
GFR NON-AFRICAN AMERICAN: >60
GLOBULIN: 3.8 G/DL
GLUCOSE BLD-MCNC: 115 MG/DL (ref 74–106)
HCT VFR BLD CALC: 32.1 % (ref 37–47)
HEMOGLOBIN: 9.3 G/DL (ref 11.5–16.5)
MAGNESIUM: 2 MG/DL (ref 1.7–2.4)
MCH RBC QN AUTO: 23.3 PG (ref 27–32)
MCHC RBC AUTO-ENTMCNC: 29 G/DL (ref 31–35)
MCV RBC AUTO: 80.5 FL (ref 80–100)
PDW BLD-RTO: 16.3 % (ref 11–16)
PLATELET # BLD: 358 K/UL (ref 150–400)
PMV BLD AUTO: 10.2 FL (ref 6–10)
POTASSIUM REFLEX MAGNESIUM: 3.5 MMOL/L (ref 3.4–5.1)
RBC # BLD: 3.99 M/UL (ref 3.8–5.8)
SODIUM BLD-SCNC: 138 MMOL/L (ref 136–145)
TOTAL PROTEIN: 7 G/DL (ref 6.4–8.3)
WBC # BLD: 5.8 K/UL (ref 4–11)

## 2020-01-29 PROCEDURE — 85027 COMPLETE CBC AUTOMATED: CPT

## 2020-01-29 PROCEDURE — 97802 MEDICAL NUTRITION INDIV IN: CPT

## 2020-01-29 PROCEDURE — 2580000003 HC RX 258: Performed by: PHYSICIAN ASSISTANT

## 2020-01-29 PROCEDURE — 80053 COMPREHEN METABOLIC PANEL: CPT

## 2020-01-29 PROCEDURE — P9016 RBC LEUKOCYTES REDUCED: HCPCS

## 2020-01-29 PROCEDURE — 1200000000 HC SEMI PRIVATE

## 2020-01-29 PROCEDURE — 2500000003 HC RX 250 WO HCPCS: Performed by: PHYSICIAN ASSISTANT

## 2020-01-29 PROCEDURE — 92610 EVALUATE SWALLOWING FUNCTION: CPT

## 2020-01-29 PROCEDURE — 36430 TRANSFUSION BLD/BLD COMPNT: CPT

## 2020-01-29 PROCEDURE — 83735 ASSAY OF MAGNESIUM: CPT

## 2020-01-29 PROCEDURE — 96365 THER/PROPH/DIAG IV INF INIT: CPT

## 2020-01-29 PROCEDURE — 99222 1ST HOSP IP/OBS MODERATE 55: CPT | Performed by: INTERNAL MEDICINE

## 2020-01-29 PROCEDURE — 6370000000 HC RX 637 (ALT 250 FOR IP): Performed by: PHYSICIAN ASSISTANT

## 2020-01-29 PROCEDURE — 6360000002 HC RX W HCPCS: Performed by: PHYSICIAN ASSISTANT

## 2020-01-29 PROCEDURE — 36415 COLL VENOUS BLD VENIPUNCTURE: CPT

## 2020-01-29 RX ORDER — ONDANSETRON 4 MG/1
4 TABLET, FILM COATED ORAL EVERY 6 HOURS PRN
COMMUNITY

## 2020-01-29 RX ORDER — FERROUS SULFATE 325(65) MG
325 TABLET ORAL 2 TIMES DAILY WITH MEALS
Qty: 60 TABLET | Refills: 3 | Status: ON HOLD | OUTPATIENT
Start: 2020-01-29 | End: 2020-03-29 | Stop reason: SDUPTHER

## 2020-01-29 RX ORDER — ACETAMINOPHEN 650 MG/1
650 SUPPOSITORY RECTAL EVERY 6 HOURS PRN
Status: DISCONTINUED | OUTPATIENT
Start: 2020-01-29 | End: 2020-01-30 | Stop reason: HOSPADM

## 2020-01-29 RX ORDER — CYCLOBENZAPRINE HCL 10 MG
5 TABLET ORAL DAILY
Status: DISCONTINUED | OUTPATIENT
Start: 2020-01-29 | End: 2020-01-30 | Stop reason: HOSPADM

## 2020-01-29 RX ORDER — ZINC OXIDE AND DIMETHICONE 120; 10 MG/G; MG/G
CREAM TOPICAL
Status: DISPENSED
Start: 2020-01-29 | End: 2020-01-30

## 2020-01-29 RX ORDER — NIFEDIPINE 30 MG/1
30 TABLET, EXTENDED RELEASE ORAL DAILY
Status: DISCONTINUED | OUTPATIENT
Start: 2020-01-29 | End: 2020-01-30 | Stop reason: HOSPADM

## 2020-01-29 RX ORDER — CYCLOBENZAPRINE HCL 5 MG
5 TABLET ORAL DAILY
COMMUNITY

## 2020-01-29 RX ORDER — CALCIUM POLYCARBOPHIL 625 MG 625 MG/1
625 TABLET ORAL 2 TIMES DAILY
Status: DISCONTINUED | OUTPATIENT
Start: 2020-01-29 | End: 2020-01-30 | Stop reason: HOSPADM

## 2020-01-29 RX ORDER — IBUPROFEN 200 MG
TABLET ORAL DAILY
COMMUNITY
End: 2020-03-27

## 2020-01-29 RX ORDER — IBUPROFEN 200 MG
TABLET ORAL DAILY
Status: DISCONTINUED | OUTPATIENT
Start: 2020-01-29 | End: 2020-01-30 | Stop reason: HOSPADM

## 2020-01-29 RX ORDER — SENNA PLUS 8.6 MG/1
2 TABLET ORAL NIGHTLY PRN
Status: DISCONTINUED | OUTPATIENT
Start: 2020-01-29 | End: 2020-01-30 | Stop reason: HOSPADM

## 2020-01-29 RX ADMIN — SUCRALFATE 1 G: 1 TABLET ORAL at 11:30

## 2020-01-29 RX ADMIN — CYCLOBENZAPRINE HYDROCHLORIDE 5 MG: 10 TABLET, FILM COATED ORAL at 13:31

## 2020-01-29 RX ADMIN — ACYCLOVIR 800 MG: 800 TABLET ORAL at 16:13

## 2020-01-29 RX ADMIN — Medication 30 ML: at 14:00

## 2020-01-29 RX ADMIN — IRON SUCROSE 200 MG: 20 INJECTION, SOLUTION INTRAVENOUS at 09:27

## 2020-01-29 RX ADMIN — VENLAFAXINE 200 MG: 75 TABLET ORAL at 09:27

## 2020-01-29 RX ADMIN — FOLIC ACID: 5 INJECTION, SOLUTION INTRAMUSCULAR; INTRAVENOUS; SUBCUTANEOUS at 11:30

## 2020-01-29 RX ADMIN — FERROUS SULFATE TAB EC 324 MG (65 MG FE EQUIVALENT) 324 MG: 324 (65 FE) TABLET DELAYED RESPONSE at 09:20

## 2020-01-29 RX ADMIN — CALCIUM POLYCARBOPHIL 625 MG TABLET 1250 MG: at 09:19

## 2020-01-29 RX ADMIN — SIMVASTATIN 40 MG: 20 TABLET, FILM COATED ORAL at 21:26

## 2020-01-29 RX ADMIN — CYPROHEPTADINE HYDROCHLORIDE 4 MG: 4 TABLET ORAL at 09:20

## 2020-01-29 RX ADMIN — TOPIRAMATE 25 MG: 25 TABLET, FILM COATED ORAL at 21:26

## 2020-01-29 RX ADMIN — Medication 30 ML: at 11:31

## 2020-01-29 RX ADMIN — MICONAZOLE NITRATE: 20 POWDER TOPICAL at 21:31

## 2020-01-29 RX ADMIN — CYPROHEPTADINE HYDROCHLORIDE 4 MG: 4 TABLET ORAL at 13:31

## 2020-01-29 RX ADMIN — MORPHINE SULFATE 15 MG: 15 TABLET, FILM COATED, EXTENDED RELEASE ORAL at 09:20

## 2020-01-29 RX ADMIN — ACYCLOVIR 800 MG: 800 TABLET ORAL at 21:26

## 2020-01-29 RX ADMIN — BACLOFEN 10 MG: 10 TABLET ORAL at 09:20

## 2020-01-29 RX ADMIN — MICONAZOLE NITRATE: 20 POWDER TOPICAL at 11:31

## 2020-01-29 RX ADMIN — CALCIUM POLYCARBOPHIL 625 MG TABLET 625 MG: at 21:26

## 2020-01-29 RX ADMIN — NIFEDIPINE 30 MG: 30 TABLET, FILM COATED, EXTENDED RELEASE ORAL at 13:31

## 2020-01-29 RX ADMIN — CYPROHEPTADINE HYDROCHLORIDE 4 MG: 4 TABLET ORAL at 21:26

## 2020-01-29 RX ADMIN — POLYMYXIN B SULFATE, BACITRACIN ZINC, NEOMYCIN SULFATE: 5000; 3.5; 4 OINTMENT TOPICAL at 13:31

## 2020-01-29 RX ADMIN — ACYCLOVIR 800 MG: 800 TABLET ORAL at 06:06

## 2020-01-29 RX ADMIN — ZINC SULFATE 220 MG (50 MG) CAPSULE 220 MG: CAPSULE at 09:19

## 2020-01-29 RX ADMIN — MULTIPLE VITAMINS W/ MINERALS TAB 1 TABLET: TAB at 09:20

## 2020-01-29 RX ADMIN — SUCRALFATE 1 G: 1 TABLET ORAL at 18:22

## 2020-01-29 RX ADMIN — MORPHINE SULFATE 15 MG: 15 TABLET, FILM COATED, EXTENDED RELEASE ORAL at 21:26

## 2020-01-29 RX ADMIN — OXYCODONE HYDROCHLORIDE AND ACETAMINOPHEN 500 MG: 500 TABLET ORAL at 09:20

## 2020-01-29 RX ADMIN — DONEPEZIL HYDROCHLORIDE 10 MG: 5 TABLET, FILM COATED ORAL at 21:26

## 2020-01-29 RX ADMIN — ACYCLOVIR 800 MG: 800 TABLET ORAL at 18:22

## 2020-01-29 RX ADMIN — ACYCLOVIR 800 MG: 800 TABLET ORAL at 11:30

## 2020-01-29 RX ADMIN — SUCRALFATE 1 G: 1 TABLET ORAL at 21:26

## 2020-01-29 RX ADMIN — TOPIRAMATE 25 MG: 25 TABLET, FILM COATED ORAL at 09:20

## 2020-01-29 RX ADMIN — PANTOPRAZOLE SODIUM 40 MG: 40 TABLET, DELAYED RELEASE ORAL at 16:13

## 2020-01-29 RX ADMIN — SUCRALFATE 1 G: 1 TABLET ORAL at 06:07

## 2020-01-29 RX ADMIN — PANTOPRAZOLE SODIUM 40 MG: 40 TABLET, DELAYED RELEASE ORAL at 06:07

## 2020-01-29 ASSESSMENT — PAIN SCALES - GENERAL
PAINLEVEL_OUTOF10: 7
PAINLEVEL_OUTOF10: 10

## 2020-01-29 NOTE — H&P
Short Stay Summary        Patient Hafsa Granger     Patient's Nila Martinez MD     Admit Date:    1/28/2020      Discharge Date:   1/29/2020  Kaleb Rooney MD     Discharge Valerie Bowles, [place]     Reason for this admission:   [address]     Discharge Diagnoses:          Active Hospital Problems     Diagnosis Date Noted    Anemia [D64.9] 07/02/2019    Declining functional status [R53.81] 07/02/2019    Dementia ([address] [F03.90] 07/02/2019    Dysphagia [R13.10] 07/02/2019         Procedures:  No orders to display            Consults:   None     HISTORY OF PRESENT ILLNESS:   The patient is a 77 y. o. femalewith past medical history of dementia, declining functional status, iron deficiency anemia requiring frequent blood transfusions, dysphasia who is a long-term resident at [address] [place] [address] who presents due to low hemoglobin on [facility] [address] denies any acute complaint. Gabe Gant is bedbound at baseline with chronic contractures in her extremities. Gabe Gant is alert and oriented. Yelitzajonnie Jessica at bedside states she was not having any issues that he knows of she was just having regular blood work done when they discovered her hemoglobin has been low.  This is been an ongoing issue for her. [facility] scopes with Dr. Olga Hanna in July without any source of bleeding. Devaughn Herring at [facility] in October and had multiple colonoscopies as well as capsule endoscopy without source of bleeding.   reports biopsy at that time negative for any malignancy.  states she just needs \"built back up and then she can go back to the nursing home. \" patient states she si ready to go back today.     Review of system  Constitutional:  Denies fever or chills. Positive for chronic fatigue  Eyes:  Denies change in visual acuity or discharge. HENT:  Denies nasal congestion or sore throat. Positive for dry mouth  Respiratory:  Denies cough or shortness of breath.   Cardiovascular:  Denies chest pain, palpitation or swelling in LEs. GI:  Denies abdominal pain, nausea, vomiting, bloody stools or diarrhea. :  Denies dysuria or frequency. Musculoskeletal:  postitive for chronic arthralgias, chronic back pain, chronic extremity contractures  Integument:  Denies rash or itching. Neurologic:  Denies headache, focal weakness or sensory changes. Psychiatric:  Denies depression or anxiety.     Past Medical History:  Past Medical History                Diagnosis Date    Anemia      Anxiety      Blind      Narayan Bonnet syndrome      Chronic back pain      Constipation      Dementia (HCC)      Depression      Diabetes mellitus (HCC)      Dysphagia      GERD (gastroesophageal reflux disease)      GI bleed      History of opioid abuse (HCC)      Hyperlipidemia      Hypertension      Insomnia      Interstitial cystitis      Retinitis pigmentosa      UTI (urinary tract infection)      Vitamin D deficiency      Vitamin deficiency              Past Surgical History:  Past Surgical History                 Procedure Laterality Date    APPENDECTOMY        BACK SURGERY         x2    BLADDER SURGERY         multiple bladder surgeries    BREAST SURGERY         lump removed    CARPAL TUNNEL RELEASE Bilateral      CHOLECYSTECTOMY        COLONOSCOPY N/A 7/8/2019     COLONOSCOPY POLYPECTOMY SNARE/COLD BIOPSY performed by Yaneli Reid MD at [address] [place] [address]    UPPER GASTROINTESTINAL ENDOSCOPY N/A 7/8/2019     EGD BIOPSY performed by Yaneli Reid MD at [address] [place] [address]  TOBACCO:   reports that she has never smoked. She has never used smokeless tobacco.  ETOH:   reports no history of alcohol use. OCCUPATION:  None      Family History:   Family History   History reviewed.  No pertinent family history.         Allergies:  Aspirin; Ibuprofen; and Thorazine [chlorpromazine]     Medications Prior to Admission:    Home Medications                 Prior to Admission medications    Medication Sig Start Date End Date Taking? Authorizing Provider   ondansetron (ZOFRAN) 4 MG tablet Take 4 mg by mouth every 6 hours as needed for Nausea or Vomiting     Yes Historical Provider, MD   cyclobenzaprine (FLEXERIL) 5 MG tablet Take 5 mg by mouth daily     Yes Historical Provider, MD   neomycin-bacitracin-polymyxin (NEOSPORIN) 5-400-5000 ointment Apply topically daily Cleanse self-inflicted abrasion to left hip with normal saline, apply JENN and clean, dry dressing daily until resolved     Yes Historical Provider, MD   ferrous sulfate 325 (65 Fe) MG tablet Take 1 tablet by mouth 2 times daily (with meals) 1/29/20   Yes FLASH Peña   Misc. Throat Products (OASIS MOISTURIZING MOUTHWASH) Take 30 mLs by mouth every 2 hours (while awake) Nursing staff to apply to swab and perform oral care or allow patient to swish and spit if she is able 1/29/20 3/29/20 Yes FLASH Peña   topiramate (TOPAMAX) 25 MG tablet Take 25 mg by mouth 2 times daily     Yes Historical Provider, MD   acyclovir (ZOVIRAX) 800 MG tablet Take 800 mg by mouth 5 times daily 1/27/20 2/4/20 Yes Historical Provider, MD   nystatin (MYCOSTATIN) 655136 UNIT/GM powder Apply topically 2 times daily Apply to buttocks, groin and marquez area every shift x 7 days.  1/26/20 2/1/20 Yes Historical Provider, MD   pantoprazole sodium (PROTONIX) 40 MG PACK packet Take 40 mg by mouth 2 times daily (before meals)     Yes Historical Provider, MD   ascorbic acid (VITAMIN C) 500 MG tablet Take 500 mg by mouth daily     Yes Historical Provider, MD   NIFEdipine (ADALAT CC) 30 MG extended release tablet Take 30 mg by mouth daily     Yes Historical Provider, MD   polycarbophil (FIBERCON) 625 MG tablet Take 625 mg by mouth 2 times daily      Yes Historical Provider, MD   venlafaxine (EFFEXOR) 100 MG tablet Take 200 mg by mouth daily     Yes Historical Provider, MD   sucralfate ROM.   Integument:  Warm and dry. No rash.   Neurologic:  Alert, oriented to self and place. Oriented to date of birth. No apparent focal deficits noted . Answers questions appropriately. Tardive dyskinesia noted  Psychiatric:  Speech and behavior appropriate                   Lab Results   Component Value Date     WBC 5.8 01/29/2020     HGB 9.3 (L) 01/29/2020     HCT 32.1 (L) 01/29/2020     MCV 80.5 01/29/2020      01/29/2020                   Lab Results   Component Value Date      01/29/2020     K 3.5 01/29/2020      01/29/2020     CO2 25 01/29/2020     BUN 27 (H) 01/29/2020     CREATININE <0.5 01/29/2020     GLUCOSE 115 (H) 01/29/2020     CALCIUM 9.0 01/29/2020     PROT 7.0 01/29/2020     LABALBU 3.2 (L) 01/29/2020     BILITOT 0.5 01/29/2020     ALKPHOS 187 (H) 01/29/2020     AST 27 01/29/2020     ALT 39 (H) 01/29/2020     LABGLOM >60 01/29/2020     GFRAA >59 01/29/2020     AGRATIO 0.8 01/29/2020     GLOB 3.8 01/29/2020      Assessment and Plan/Hospital course:              Active Hospital Problems     Diagnosis Date Noted    Anemia [D64.9]  Longstanding history of iron deficiency anemia requiring transfusions. EGD and colonoscopy in July eiwt Dr. Jodie Bowers and no source of bleeding. Admitted at [facility] for the same in October and capsule endoscopy and multiple colonoscopies without evidence of bleeding. Per  biopsies at that time negative for malignancy. Hgb 6.5 today. Transfuse 2 units PRBCs and hemoglobin 9.3. on sucralfate and PPI per [facility] GI recommendations. venofer infusion ordered with iron deficiency. Patient's  expresses he does not want her to have scopes again. She would be appropriate candidate for palliative measures and possibly hospice but will defer this to pcp. Encourage improved nutrition in setting of iron deficiency. If family does not want palliative/hospice may benefit from feeding tube as well, defer further conversation about this to pcp as well.  Stable for discharge back to nursing facility and  agreeable.  07/02/2019    Declining functional status [R53.81]  Long term resident at [facility] facility. She is bedbound and non ambulatory. She is blind. history of dementia. she needs assistance with eating and drinking. Continue oral care q2h at nursing facility at [facility] back to long term care facility today.  07/02/2019    Dementia ([address] [F03.90]  Continue home regimen. Mental status at her baseline.  07/02/2019    Dysphagia [R13.10]  SLP consulted and diet recommendations followed.  07/02/2019         Disposition: long term care facility  Discharged Condition: Stable  Activity: activity as tolerated  Diet: regular diet, minced and moist  Follow Up: Primary Care Physician in one week     Discharge Medications:       Discharge Medications                Current Discharge Medication List                   Details   Misc.  Throat Products (OASIS MOISTURIZING MOUTHWASH) Take 30 mLs by mouth every 2 hours (while awake) Nursing staff to apply to swab and perform oral care or allow patient to swish and spit if she is able  Qty: 8100 mL, Refills: 1                       Details   ferrous sulfate 325 (65 Fe) MG tablet Take 1 tablet by mouth 2 times daily (with meals)  Qty: 60 tablet, Refills: 3                       Details   ondansetron (ZOFRAN) 4 MG tablet Take 4 mg by mouth every 6 hours as needed for Nausea or Vomiting       cyclobenzaprine (FLEXERIL) 5 MG tablet Take 5 mg by mouth daily       neomycin-bacitracin-polymyxin (NEOSPORIN) 5-400-5000 ointment Apply topically daily Cleanse self-inflicted abrasion to left hip with normal saline, apply JENN and clean, dry dressing daily until resolved       topiramate (TOPAMAX) 25 MG tablet Take 25 mg by mouth 2 times daily       acyclovir (ZOVIRAX) 800 MG tablet Take 800 mg by mouth 5 times daily       nystatin (MYCOSTATIN) 091001 UNIT/GM powder Apply topically 2 times daily Apply to buttocks, groin and marquez area every shift x 7 days.       pantoprazole sodium (PROTONIX) 40 MG PACK packet Take 40 mg by mouth 2 times daily (before meals)       ascorbic acid (VITAMIN C) 500 MG tablet Take 500 mg by mouth daily       NIFEdipine (ADALAT CC) 30 MG extended release tablet Take 30 mg by mouth daily       polycarbophil (FIBERCON) 625 MG tablet Take 625 mg by mouth 2 times daily        venlafaxine (EFFEXOR) 100 MG tablet Take 200 mg by mouth daily       sucralfate (CARAFATE) 1 GM tablet Take 1 tablet by mouth 4 times daily  Qty: 120 tablet, Refills: 0       cyproheptadine (PERIACTIN) 4 MG tablet Take 4 mg by mouth 3 times daily       Zinc Chelated 50 MG TABS Take 1 each by mouth daily       Multiple Vitamins-Minerals (THERA M PLUS) TABS Take 1 each by mouth daily       morphine (MS CONTIN) 15 MG extended release tablet Take 15 mg by mouth 2 times daily. .       donepezil (ARICEPT) 10 MG tablet Take 10 mg by mouth nightly       simvastatin (ZOCOR) 40 MG tablet Take 40 mg by mouth nightly        senna (SENOKOT) 8.6 MG tablet Take 2 tablets by mouth nightly as needed for Constipation        bisacodyl (DULCOLAX) 10 MG suppository Place 10 mg rectally daily as needed for Constipation Use if no bowel movement in 4 days       acetaminophen (TYLENOL) 650 MG suppository Place 650 mg rectally every 6 hours as needed for Fever or Pain                   Patient was seen and examined by Dr. Sergey Briggs and plan of care reviewed.        Signed:  Electronically signed FLASH Pappas on 1/29/2020 at 2:37 PM         Thank Gurwinder Reyes MD for the opportunity to be involved in this patient's care.  If you have any questions or concerns please feel free to contact me at [phone].

## 2020-01-29 NOTE — DISCHARGE SUMMARY
Home Medications           Prior to Admission medications    Medication Sig Start Date End Date Taking? Authorizing Provider   ondansetron (ZOFRAN) 4 MG tablet Take 4 mg by mouth every 6 hours as needed for Nausea or Vomiting     Yes Historical Provider, MD   cyclobenzaprine (FLEXERIL) 5 MG tablet Take 5 mg by mouth daily     Yes Historical Provider, MD   neomycin-bacitracin-polymyxin (NEOSPORIN) 5-400-5000 ointment Apply topically daily Cleanse self-inflicted abrasion to left hip with normal saline, apply JENN and clean, dry dressing daily until resolved     Yes Historical Provider, MD   ferrous sulfate 325 (65 Fe) MG tablet Take 1 tablet by mouth 2 times daily (with meals) 1/29/20   Yes FLASH Hammonds   [facility]. Throat Products (OASIS MOISTURIZING MOUTHWASH) Take 30 mLs by mouth every 2 hours (while awake) Nursing staff to apply to swab and perform oral care or allow patient to swish and spit if she is able 1/29/20 3/29/20 Yes FLASH Hammonds   topiramate (TOPAMAX) 25 MG tablet Take 25 mg by mouth 2 times daily     Yes Historical Provider, MD   acyclovir (ZOVIRAX) 800 MG tablet Take 800 mg by mouth 5 times daily 1/27/20 2/4/20 Yes Historical Provider, MD   nystatin (MYCOSTATIN) 449727 UNIT/GM powder Apply topically 2 times daily Apply to buttocks, groin and marquez area every shift x 7 days.  1/26/20 2/1/20 Yes Historical Provider, MD   pantoprazole sodium (PROTONIX) 40 MG PACK packet Take 40 mg by mouth 2 times daily (before meals)     Yes Historical Provider, MD   ascorbic acid (VITAMIN C) 500 MG tablet Take 500 mg by mouth daily     Yes Historical Provider, MD   NIFEdipine (ADALAT CC) 30 MG extended release tablet Take 30 mg by mouth daily     Yes Historical Provider, MD   polycarbophil (FIBERCON) 625 MG tablet Take 625 mg by mouth 2 times daily      Yes Historical Provider, MD   venlafaxine (EFFEXOR) 100 MG tablet Take 200 mg by mouth daily     Yes Historical Provider, MD   sucralfate (CARAFATE) 1 GM tablet agreeable. 07/02/2019    Declining functional status [R53.81]  Long term resident at [facility]. She is bedbound and non ambulatory. She is blind. history of dementia. she needs assistance with eating and drinking. Continue oral care q2h at nursing facility at [facility]. DC back to long term care facility today. 07/02/2019    Dementia ([address] [F03.90]  Continue home regimen. Mental status at her baseline. 07/02/2019    Dysphagia [R13.10]  SLP consulted and diet recommendations followed. 07/02/2019         Disposition: long term care facility  Discharged Condition: Stable  Activity: activity as tolerated  Diet: regular diet, minced and moist  Follow Up: Primary Care Physician in one week     Discharge Medications:       Discharge Medications        Current Discharge Medication List                 Details   Misc.  Throat Products (OASIS MOISTURIZING MOUTHWASH) Take 30 mLs by mouth every 2 hours (while awake) Nursing staff to apply to swab and perform oral care or allow patient to swish and spit if she is able  Qty: 8100 mL, Refills: 1                     Details   ferrous sulfate 325 (65 Fe) MG tablet Take 1 tablet by mouth 2 times daily (with meals)  Qty: 60 tablet, Refills: 3                     Details   ondansetron (ZOFRAN) 4 MG tablet Take 4 mg by mouth every 6 hours as needed for Nausea or Vomiting       cyclobenzaprine (FLEXERIL) 5 MG tablet Take 5 mg by mouth daily       neomycin-bacitracin-polymyxin (NEOSPORIN) 5-400-5000 ointment Apply topically daily Cleanse self-inflicted abrasion to left hip with normal saline, apply JENN and clean, dry dressing daily until resolved       topiramate (TOPAMAX) 25 MG tablet Take 25 mg by mouth 2 times daily       acyclovir (ZOVIRAX) 800 MG tablet Take 800 mg by mouth 5 times daily       nystatin (MYCOSTATIN) 244188 UNIT/GM powder Apply topically 2 times daily Apply to buttocks, groin and marquez area every shift x 7 days.       pantoprazole sodium (PROTONIX) 40 MG PACK packet Take 40 mg by mouth 2 times daily (before meals)       ascorbic acid (VITAMIN C) 500 MG tablet Take 500 mg by mouth daily       NIFEdipine (ADALAT CC) 30 MG extended release tablet Take 30 mg by mouth daily       polycarbophil (FIBERCON) 625 MG tablet Take 625 mg by mouth 2 times daily        venlafaxine (EFFEXOR) 100 MG tablet Take 200 mg by mouth daily       sucralfate (CARAFATE) 1 GM tablet Take 1 tablet by mouth 4 times daily  Qty: 120 tablet, Refills: 0       cyproheptadine (PERIACTIN) 4 MG tablet Take 4 mg by mouth 3 times daily       Zinc Chelated 50 MG TABS Take 1 each by mouth daily       Multiple Vitamins-Minerals (THERA M PLUS) TABS Take 1 each by mouth daily       morphine (MS CONTIN) 15 MG extended release tablet Take 15 mg by mouth 2 times daily. .       donepezil (ARICEPT) 10 MG tablet Take 10 mg by mouth nightly       simvastatin (ZOCOR) 40 MG tablet Take 40 mg by mouth nightly        senna (SENOKOT) 8.6 MG tablet Take 2 tablets by mouth nightly as needed for Constipation        bisacodyl (DULCOLAX) 10 MG suppository Place 10 mg rectally daily as needed for Constipation Use if no bowel movement in 4 days       acetaminophen (TYLENOL) 650 MG suppository Place 650 mg rectally every 6 hours as needed for Fever or Pain                   Patient was seen and examined by Dr. Jad Wells and plan of care reviewed.        Signed:  Electronically signed by FLASH Bullard on 1/29/2020 at 2:37 PM         Thank you Kiarra Santana MD for the opportunity to be involved in this patient's care. If you have any questions or concerns please feel free to contact me at [phone].

## 2020-01-29 NOTE — PROGRESS NOTES
Med rec completed using med list from [facility]. The following changes were made to the home med list (active list changed to match per instructions by FLASH Granados):  -acetaminophen suppository changed to include moderate pain as prn reason in addition to fever.  -Changed ondansetron from ODT to regular  -Changed nystatin powder to BID  -Removed milk of mag (ordered as part of general admission orders)  -Changed strength of fibercon from 1250mg po bid to 625mg po bid  -Baclofen removed, cylcobenzaprine 5mg po daily added  -Triple antibiotic ointment added.

## 2020-01-29 NOTE — PROGRESS NOTES
Speech Language Pathology  Facility/Department: [facility] SURG   CLINICAL BEDSIDE SWALLOW EVALUATION    NAME: Felipa Mitchell  : 1945  MRN: [id number]    ADMISSION DATE: 2020  ADMITTING DIAGNOSIS: has Anemia; Dementia ([facility] Declining functional status; Hypotension; Diabetes mellitus ([facility] Chronic back pain; Hyponatremia; Elevated lipase; Dysphagia; Acute renal failure (ARF) ([facility] Vitamin D deficiency; Fecal impaction in rectum ([facility] Heme positive stool; Gastroesophageal reflux disease with esophagitis; Chronic superficial gastritis with bleeding; Gastric polyp; Polyp of descending colon; and Internal hemorrhoids without complication on their problem list.  ONSET DATE: 2020    Recent Chest Xray/CT of Chest: NA    Date of Eval: 2020  Evaluating Therapist: Beena Ferreira    Current Diet level:  Current Diet : Dysphagia Minced and Moist (Dysphagia II)  Current Liquid Diet : Thin    Primary Complaint  Patient Complaint: Pt complained of dry mouth. Pain:  Pain Assessment  Pain Assessment: 0-10  Pain Level: 10    Reason for Referral  Felipa Mitchell was referred for a bedside swallow evaluation to assess the efficiency of her swallow function, identify signs and symptoms of aspiration and make recommendations regarding safe dietary consistencies, effective compensatory strategies, and safe eating environment. Impression  Dysphagia Diagnosis: Moderate oral stage dysphagia  Dysphagia Impression : Pt demonstrates moderate oral phase dysphagia  Dysphagia Outcome Severity Scale: Level 4: Mild moderate dysphagia- Intermittent supervision/cueing.  One - two diet consistencies restricted     Treatment Plan  Requires SLP Intervention: No  Duration/Frequency of Treatment: NA; Skilled ST services not indicated at this time  D/C Recommendations: SNF     Recommended Diet and Intervention  Diet Solids Recommendation: Dysphagia Minced and Moist (Dysphagia II)  Liquid Consistency Recommendation: Thin  Recommended Form of Meds: PO    Compensatory Swallowing Strategies  Compensatory Swallowing Strategies: Alternate solids and liquids; Check for pocketing of food on the Right;Small bites/sips;Upright as possible for all oral intake;Swallow 2 times per bite/sip; Check for pocketing of food on the Left;Eat/Feed slowly; Lingual sweep;Remain upright for 30-45 minutes after meals    Treatment/Goals  Short-term Goals  Timeframe for Short-term Goals: NA; Skilled ST services not indicated at this time  Long-term Goals  Timeframe for Long-term Goals: NA; Skilled ST services not indicated at this time    General  Subjective  Subjective: Pt upright in bed with SLP assist; pleasant and agreeable to ST evaluation; oriented x3  Behavior/Cognition: Lethargic;Cooperative;Pleasant mood  Respiratory Status: Room air  O2 Device: None (Room air)  Communication Observation: Functional  Follows Directions: Simple  Dentition: Edentulous  Patient Positioning: Upright in bed  Prior Dysphagia History: Pt has hx of oral phase dysphagia  Consistencies Administered: Dysphagia Soft and Bite-Sized (Dysphagia III); Thin - straw    Vision/Hearing  Vision  Vision: Within Functional Limits  Hearing  Hearing: Within functional limits    Oral Motor Deficits  Oral/Motor  Oral Motor: Exceptions to [facility]  Labial Strength: Reduced  Labial Coordination: Reduced  Lingual Strength: Reduced  Lingual Coordination: Reduced    Oral Phase Dysfunction  Oral Phase  Oral Phase: Exceptions  Oral Phase Dysfunction  Impaired Mastication: Mechanical soft  Decreased Anterior to Posterior Transit: Mechanical soft  Oral Phase  Oral Phase - Comment: Pt demonstrated impaired mastication of soft and bite sized and decreased anterior to posterior oral transit time with soft and bite sized.      Indicators of Pharyngeal Phase Dysfunction   Pharyngeal Phase  Pharyngeal Phase: WFL  Pharyngeal Phase   Pharyngeal: No overt s/sx of pharyngeal phase dysphagia indicated. Prognosis  Prognosis  Prognosis for safe diet advancement: good  Barriers to reach goals: age  Barriers/Prognosis Comment: Good for recommended diet  Individuals consulted  Consulted and agree with results and recommendations: Patient;Dietitian;RN    Education  Patient Education: Aspiration precaution guidelines; safe swallow strategies  Patient Education Response: Needs reinforcement  Safety Devices in place: Yes  Type of devices: Bed alarm in place;Call light within reach; Left in bed       Therapy Time  SLP Individual Minutes  Time In: 0930  Time Out: 8891  Minutes: 15     Completed by Payal Rivas, [address] [place] [address] Clinician, under the supervision of Joseph Menendez MS, [address], [place]  1/29/2020 10:50 AM

## 2020-01-30 VITALS
RESPIRATION RATE: 18 BRPM | OXYGEN SATURATION: 95 % | WEIGHT: 109.8 LBS | TEMPERATURE: 97.8 F | SYSTOLIC BLOOD PRESSURE: 133 MMHG | HEART RATE: 73 BPM | HEIGHT: 63 IN | BODY MASS INDEX: 19.45 KG/M2 | DIASTOLIC BLOOD PRESSURE: 64 MMHG

## 2020-01-30 PROCEDURE — 6370000000 HC RX 637 (ALT 250 FOR IP): Performed by: PHYSICIAN ASSISTANT

## 2020-01-30 PROCEDURE — 99238 HOSP IP/OBS DSCHRG MGMT 30/<: CPT | Performed by: PEDIATRICS

## 2020-01-30 RX ORDER — ZINC OXIDE AND DIMETHICONE 120; 10 MG/G; MG/G
CREAM TOPICAL 2 TIMES DAILY PRN
Status: DISCONTINUED | OUTPATIENT
Start: 2020-01-30 | End: 2020-01-30 | Stop reason: HOSPADM

## 2020-01-30 RX ADMIN — MORPHINE SULFATE 15 MG: 15 TABLET, FILM COATED, EXTENDED RELEASE ORAL at 07:59

## 2020-01-30 RX ADMIN — OXYCODONE HYDROCHLORIDE AND ACETAMINOPHEN 500 MG: 500 TABLET ORAL at 07:59

## 2020-01-30 RX ADMIN — SUCRALFATE 1 G: 1 TABLET ORAL at 06:07

## 2020-01-30 RX ADMIN — Medication 30 ML: at 08:01

## 2020-01-30 RX ADMIN — MICONAZOLE NITRATE: 20 POWDER TOPICAL at 09:00

## 2020-01-30 RX ADMIN — NIFEDIPINE 30 MG: 30 TABLET, FILM COATED, EXTENDED RELEASE ORAL at 07:58

## 2020-01-30 RX ADMIN — SUCRALFATE 1 G: 1 TABLET ORAL at 11:40

## 2020-01-30 RX ADMIN — MULTIPLE VITAMINS W/ MINERALS TAB 1 TABLET: TAB at 07:59

## 2020-01-30 RX ADMIN — CYPROHEPTADINE HYDROCHLORIDE 4 MG: 4 TABLET ORAL at 07:58

## 2020-01-30 RX ADMIN — CYPROHEPTADINE HYDROCHLORIDE 4 MG: 4 TABLET ORAL at 14:42

## 2020-01-30 RX ADMIN — VENLAFAXINE 200 MG: 75 TABLET ORAL at 07:58

## 2020-01-30 RX ADMIN — FERROUS SULFATE TAB EC 324 MG (65 MG FE EQUIVALENT) 324 MG: 324 (65 FE) TABLET DELAYED RESPONSE at 07:59

## 2020-01-30 RX ADMIN — CYCLOBENZAPRINE HYDROCHLORIDE 5 MG: 10 TABLET, FILM COATED ORAL at 08:04

## 2020-01-30 RX ADMIN — ACYCLOVIR 800 MG: 800 TABLET ORAL at 11:40

## 2020-01-30 RX ADMIN — ZINC SULFATE 220 MG (50 MG) CAPSULE 220 MG: CAPSULE at 07:56

## 2020-01-30 RX ADMIN — POLYMYXIN B SULFATE, BACITRACIN ZINC, NEOMYCIN SULFATE: 5000; 3.5; 4 OINTMENT TOPICAL at 08:01

## 2020-01-30 RX ADMIN — PANTOPRAZOLE SODIUM 40 MG: 40 TABLET, DELAYED RELEASE ORAL at 06:06

## 2020-01-30 RX ADMIN — TOPIRAMATE 25 MG: 25 TABLET, FILM COATED ORAL at 07:58

## 2020-01-30 RX ADMIN — ACYCLOVIR 800 MG: 800 TABLET ORAL at 06:06

## 2020-01-30 RX ADMIN — CALCIUM POLYCARBOPHIL 625 MG TABLET 625 MG: at 07:56

## 2020-01-30 RX ADMIN — ACYCLOVIR 800 MG: 800 TABLET ORAL at 14:43

## 2020-01-30 ASSESSMENT — PAIN SCALES - GENERAL: PAINLEVEL_OUTOF10: 7

## 2020-01-30 NOTE — DISCHARGE SUMMARY
chest pain, palpitation or swelling in LEs. GI:  Denies abdominal pain, nausea, vomiting, bloody stools or diarrhea. :  Denies dysuria or frequency. Musculoskeletal:  postitive for chronic arthralgias, chronic back pain, chronic extremity contractures  Integument:  Denies rash or itching. Neurologic:  Denies headache, focal weakness or sensory changes. Psychiatric:  Denies depression or anxiety.     Past Medical History:  Past Medical History                Diagnosis Date    Anemia      Anxiety      Blind      Narayan Bonnet syndrome      Chronic back pain      Constipation      Dementia (HCC)      Depression      Diabetes mellitus (HCC)      Dysphagia      GERD (gastroesophageal reflux disease)      GI bleed      History of opioid abuse (HCC)      Hyperlipidemia      Hypertension      Insomnia      Interstitial cystitis      Retinitis pigmentosa      UTI (urinary tract infection)      Vitamin D deficiency      Vitamin deficiency              Past Surgical History:  Past Surgical History                 Procedure Laterality Date    APPENDECTOMY        BACK SURGERY         x2    BLADDER SURGERY         multiple bladder surgeries    BREAST SURGERY         lump removed    CARPAL TUNNEL RELEASE Bilateral      CHOLECYSTECTOMY        COLONOSCOPY N/A 7/8/2019     COLONOSCOPY POLYPECTOMY SNARE/COLD BIOPSY performed by Amador Britt MD at [address] [place] [address]    UPPER GASTROINTESTINAL ENDOSCOPY N/A 7/8/2019     EGD BIOPSY performed by Amador Britt MD at [address] [place] [address]  TOBACCO:   reports that she has never smoked. She has never used smokeless tobacco.  ETOH:   reports no history of alcohol use. OCCUPATION:  None      Family History:   Family History   History reviewed.  No pertinent family history.         Allergies:  Aspirin; Ibuprofen; and Thorazine [chlorpromazine]     Medications Prior to lymphadenopathy  Respiratory:  No respiratory distress on room air, no wheezing, rales or rhonchi detected  Cardiovascular:  Normal rate, normal rhythm, no murmurs, no gallops, no rubs, no edema   GI:  Soft, nondistended, normal bowel sounds, nontender, no voluntary guarding  Musculoskeletal:  No cyanosis . Contractures noted BLE with decreased ROM.    Integument:  Warm and dry. No rash.   Neurologic:  Alert, oriented to self and place. Oriented to date of birth. No apparent focal deficits noted . Answers questions appropriately. Tardive dyskinesia noted  Psychiatric:  Speech and behavior appropriate                   Lab Results   Component Value Date     WBC 5.8 01/29/2020     HGB 9.3 (L) 01/29/2020     HCT 32.1 (L) 01/29/2020     MCV 80.5 01/29/2020      01/29/2020                   Lab Results   Component Value Date      01/29/2020     K 3.5 01/29/2020      01/29/2020     CO2 25 01/29/2020     BUN 27 (H) 01/29/2020     CREATININE <0.5 01/29/2020     GLUCOSE 115 (H) 01/29/2020     CALCIUM 9.0 01/29/2020     PROT 7.0 01/29/2020     LABALBU 3.2 (L) 01/29/2020     BILITOT 0.5 01/29/2020     ALKPHOS 187 (H) 01/29/2020     AST 27 01/29/2020     ALT 39 (H) 01/29/2020     LABGLOM >60 01/29/2020     GFRAA >59 01/29/2020     AGRATIO 0.8 01/29/2020     GLOB 3.8 01/29/2020      Assessment and Plan/Hospital course:              Active Hospital Problems     Diagnosis Date Noted    Anemia [D64.9]  Longstanding history of iron deficiency anemia requiring transfusions. EGD and colonoscopy in July eiwt Dr. Daksha Oscar and no source of bleeding. Admitted at [facility] for the same in October and capsule endoscopy and multiple colonoscopies without evidence of bleeding. Per  biopsies at that time negative for malignancy. Hgb 6.5 today. Transfuse 2 units PRBCs and hemoglobin 9.3. on sucralfate and PPI per [facility] GI recommendations. venofer infusion ordered with iron deficiency.  Patient's  expresses he does not want not complete and therefore she could not be discharged.  No change to plan of care and she Is stable for discharge back to nursing facility today since insurance precertification is complete.

## 2020-01-30 NOTE — PROGRESS NOTES
Placed call to [facility]

## 2020-01-30 NOTE — PROGRESS NOTES
Pt removed own IV; site benign. Discharged home. Pt verbalized understanding of discharge instructions. Written prescription given. Accompanied to exit.

## 2020-01-30 NOTE — CONSULTS
Nutrition Assessment    Type and Reason for Visit: Initial, Positive Nutrition Screen, Consult(Late entry: Consult completed on 1/29/20)    Nutrition Recommendations: Helping patient position food and drinks r/t blindness and keep tray within close proximity so she can easily access fluids and foods. Will start ONS BID. Nutrition Assessment: Pt at risk for ongoing nutritional compromise AEB decreased intakes and increased needs. Will start ONS and has MVI ordered. Malnutrition Assessment:  · Malnutrition Status: At risk for malnutrition  · Context: Chronic illness  · Findings of the 6 clinical characteristics of malnutrition (Minimum of 2 out of 6 clinical characteristics is required to make the diagnosis of moderate or severe Protein Calorie Malnutrition based on AND/ASPEN Guidelines):  1. Energy Intake-Unable to assess, Unable to assess    2. Weight Loss-Unable to assess,    3. Fat Loss-No significant subcutaneous fat loss,    4. Muscle Loss-Unable to assess,    5. Fluid Accumulation-No significant fluid accumulation,    6.  Strength-Not measured    Nutrition Risk Level: Moderate, High    Nutrient Needs:  · Estimated Daily Total Kcal: 3672-5762  · Estimated Daily Protein (g): 50-60(1-1.2 g/kg abw)  · Estimated Daily Total Fluid (ml/day): 4088-2443(5 ml/kcal)    Nutrition Diagnosis:   · Problem: Inadequate oral intake  · Etiology: related to Cognitive or neurological impairment, Increased demand for energy/nutrients     Signs and symptoms:  as evidenced by Presence of wounds, Intake 0-25%    Objective Information:  · Nutrition-Focused Physical Findings: Pt is thin, contracted. No edema reported. Has deep tissue injury. Pt is blind, needs help in positioning food and liquids.   · Wound Type: Deep Tissue Injury  · Current Nutrition Therapies:  · Oral Diet Orders: Dysphagia Minced and Moist (Dysphagia 2)   · Oral Diet intake: 1-25%  · Oral Nutrition Supplement (ONS) Orders: Standard High Calorie Oral

## 2020-03-27 ENCOUNTER — HOSPITAL ENCOUNTER (INPATIENT)
Facility: HOSPITAL | Age: 75
LOS: 2 days | Discharge: NURSING, REHAB OR LONG-TERM CARE | DRG: 812 | End: 2020-03-29
Attending: FAMILY MEDICINE | Admitting: INTERNAL MEDICINE
Payer: MEDICARE

## 2020-03-27 LAB
A/G RATIO: 1 (ref 0.8–2)
ABO/RH: NORMAL
ALBUMIN SERPL-MCNC: 3.5 G/DL (ref 3.4–4.8)
ALP BLD-CCNC: 162 U/L (ref 25–100)
ALT SERPL-CCNC: 47 U/L (ref 4–36)
ANION GAP SERPL CALCULATED.3IONS-SCNC: 11 MMOL/L (ref 3–16)
ANTIBODY SCREEN: NORMAL
AST SERPL-CCNC: 47 U/L (ref 8–33)
BACTERIA: ABNORMAL /HPF
BASOPHILS ABSOLUTE: 0 K/UL (ref 0–0.1)
BASOPHILS RELATIVE PERCENT: 0.7 %
BILIRUB SERPL-MCNC: <0.2 MG/DL (ref 0.3–1.2)
BILIRUBIN URINE: NEGATIVE
BLOOD BANK DISPENSE STATUS: NORMAL
BLOOD BANK DISPENSE STATUS: NORMAL
BLOOD BANK PRODUCT CODE: NORMAL
BLOOD BANK PRODUCT CODE: NORMAL
BLOOD, URINE: NEGATIVE
BPU ID: NORMAL
BPU ID: NORMAL
BUN BLDV-MCNC: 31 MG/DL (ref 6–20)
CALCIUM SERPL-MCNC: 9.3 MG/DL (ref 8.5–10.5)
CHLORIDE BLD-SCNC: 99 MMOL/L (ref 98–107)
CLARITY: ABNORMAL
CO2: 26 MMOL/L (ref 20–30)
COLOR: YELLOW
COMPONENT: NORMAL
COMPONENT: NORMAL
CREAT SERPL-MCNC: 0.6 MG/DL (ref 0.4–1.2)
DONOR TYPE/RH: NORMAL
DONOR TYPE/RH: NORMAL
EOSINOPHILS ABSOLUTE: 0.2 K/UL (ref 0–0.4)
EOSINOPHILS RELATIVE PERCENT: 5.9 %
EPITHELIAL CELLS, UA: ABNORMAL /HPF (ref 0–5)
GFR AFRICAN AMERICAN: >59
GFR NON-AFRICAN AMERICAN: >60
GLOBULIN: 3.5 G/DL
GLUCOSE BLD-MCNC: 161 MG/DL (ref 74–106)
GLUCOSE URINE: NEGATIVE MG/DL
HCT VFR BLD CALC: 19.1 % (ref 37–47)
HEMOGLOBIN: 5.4 G/DL (ref 11.5–16.5)
HOLLISTER NO: NORMAL
HOLLISTER NO: NORMAL
IMMATURE GRANULOCYTES #: 0 K/UL
IMMATURE GRANULOCYTES %: 0.2 % (ref 0–5)
KETONES, URINE: NEGATIVE MG/DL
LEUKOCYTE ESTERASE, URINE: NEGATIVE
LYMPHOCYTES ABSOLUTE: 1.2 K/UL (ref 1.5–4)
LYMPHOCYTES RELATIVE PERCENT: 29.6 %
MCH RBC QN AUTO: 21.6 PG (ref 27–32)
MCHC RBC AUTO-ENTMCNC: 28.3 G/DL (ref 31–35)
MCV RBC AUTO: 76.4 FL (ref 80–100)
MICROSCOPIC EXAMINATION: YES
MONOCYTES ABSOLUTE: 0.5 K/UL (ref 0.2–0.8)
MONOCYTES RELATIVE PERCENT: 11 %
NEUTROPHILS ABSOLUTE: 2.2 K/UL (ref 2–7.5)
NEUTROPHILS RELATIVE PERCENT: 52.6 %
NITRITE, URINE: POSITIVE
OCCULT BLOOD DIAGNOSTIC: ABNORMAL
PDW BLD-RTO: 15.5 % (ref 11–16)
PH UA: 5.5 (ref 5–8)
PLATELET # BLD: 357 K/UL (ref 150–400)
PMV BLD AUTO: 10.2 FL (ref 6–10)
POTASSIUM REFLEX MAGNESIUM: 4 MMOL/L (ref 3.4–5.1)
PROTEIN UA: NEGATIVE MG/DL
RBC # BLD: 2.5 M/UL (ref 3.8–5.8)
RBC UA: ABNORMAL /HPF (ref 0–4)
SODIUM BLD-SCNC: 136 MMOL/L (ref 136–145)
SPECIFIC GRAVITY UA: 1.02 (ref 1–1.03)
TOTAL PROTEIN: 7 G/DL (ref 6.4–8.3)
URINE REFLEX TO CULTURE: YES
URINE TYPE: ABNORMAL
UROBILINOGEN, URINE: 0.2 E.U./DL
WBC # BLD: 4.1 K/UL (ref 4–11)
WBC UA: ABNORMAL /HPF (ref 0–5)

## 2020-03-27 PROCEDURE — 87186 SC STD MICRODIL/AGAR DIL: CPT

## 2020-03-27 PROCEDURE — 86900 BLOOD TYPING SEROLOGIC ABO: CPT

## 2020-03-27 PROCEDURE — 81001 URINALYSIS AUTO W/SCOPE: CPT

## 2020-03-27 PROCEDURE — 83540 ASSAY OF IRON: CPT

## 2020-03-27 PROCEDURE — 86920 COMPATIBILITY TEST SPIN: CPT

## 2020-03-27 PROCEDURE — 85025 COMPLETE CBC W/AUTO DIFF WBC: CPT

## 2020-03-27 PROCEDURE — 83550 IRON BINDING TEST: CPT

## 2020-03-27 PROCEDURE — 86901 BLOOD TYPING SEROLOGIC RH(D): CPT

## 2020-03-27 PROCEDURE — 36415 COLL VENOUS BLD VENIPUNCTURE: CPT

## 2020-03-27 PROCEDURE — 87086 URINE CULTURE/COLONY COUNT: CPT

## 2020-03-27 PROCEDURE — G0328 FECAL BLOOD SCRN IMMUNOASSAY: HCPCS

## 2020-03-27 PROCEDURE — P9016 RBC LEUKOCYTES REDUCED: HCPCS

## 2020-03-27 PROCEDURE — 82728 ASSAY OF FERRITIN: CPT

## 2020-03-27 PROCEDURE — 1200000000 HC SEMI PRIVATE

## 2020-03-27 PROCEDURE — 51701 INSERT BLADDER CATHETER: CPT

## 2020-03-27 PROCEDURE — 87077 CULTURE AEROBIC IDENTIFY: CPT

## 2020-03-27 PROCEDURE — 80053 COMPREHEN METABOLIC PANEL: CPT

## 2020-03-27 PROCEDURE — 99284 EMERGENCY DEPT VISIT MOD MDM: CPT

## 2020-03-27 PROCEDURE — 86850 RBC ANTIBODY SCREEN: CPT

## 2020-03-27 PROCEDURE — 36430 TRANSFUSION BLD/BLD COMPNT: CPT

## 2020-03-27 RX ORDER — PANTOPRAZOLE SODIUM 40 MG/10ML
40 INJECTION, POWDER, LYOPHILIZED, FOR SOLUTION INTRAVENOUS 2 TIMES DAILY
Status: DISCONTINUED | OUTPATIENT
Start: 2020-03-27 | End: 2020-03-30 | Stop reason: HOSPADM

## 2020-03-27 RX ORDER — HEPARIN SODIUM (PORCINE) LOCK FLUSH IV SOLN 100 UNIT/ML 100 UNIT/ML
500 SOLUTION INTRAVENOUS PRN
Status: CANCELLED | OUTPATIENT
Start: 2020-04-01

## 2020-03-27 RX ORDER — POLYETHYLENE GLYCOL 3350 17 G/17G
17 POWDER, FOR SOLUTION ORAL DAILY PRN
Status: DISCONTINUED | OUTPATIENT
Start: 2020-03-27 | End: 2020-03-30 | Stop reason: HOSPADM

## 2020-03-27 RX ORDER — 0.9 % SODIUM CHLORIDE 0.9 %
20 INTRAVENOUS SOLUTION INTRAVENOUS ONCE
Status: DISCONTINUED | OUTPATIENT
Start: 2020-03-27 | End: 2020-03-28

## 2020-03-27 RX ORDER — TOPIRAMATE 25 MG/1
25 TABLET ORAL 2 TIMES DAILY
Status: DISCONTINUED | OUTPATIENT
Start: 2020-03-27 | End: 2020-03-30 | Stop reason: HOSPADM

## 2020-03-27 RX ORDER — ACETAMINOPHEN 650 MG/1
650 SUPPOSITORY RECTAL EVERY 6 HOURS PRN
Status: DISCONTINUED | OUTPATIENT
Start: 2020-03-27 | End: 2020-03-30 | Stop reason: HOSPADM

## 2020-03-27 RX ORDER — SUCRALFATE 1 G/1
1 TABLET ORAL 4 TIMES DAILY
Status: DISCONTINUED | OUTPATIENT
Start: 2020-03-27 | End: 2020-03-30 | Stop reason: HOSPADM

## 2020-03-27 RX ORDER — SODIUM CHLORIDE 0.9 % (FLUSH) 0.9 %
10 SYRINGE (ML) INJECTION EVERY 12 HOURS SCHEDULED
Status: DISCONTINUED | OUTPATIENT
Start: 2020-03-27 | End: 2020-03-30 | Stop reason: HOSPADM

## 2020-03-27 RX ORDER — SODIUM CHLORIDE 0.9 % (FLUSH) 0.9 %
10 SYRINGE (ML) INJECTION PRN
Status: DISCONTINUED | OUTPATIENT
Start: 2020-03-27 | End: 2020-03-30 | Stop reason: HOSPADM

## 2020-03-27 RX ORDER — METHYLPREDNISOLONE SODIUM SUCCINATE 125 MG/2ML
125 INJECTION, POWDER, LYOPHILIZED, FOR SOLUTION INTRAMUSCULAR; INTRAVENOUS ONCE
Status: CANCELLED | OUTPATIENT
Start: 2020-04-01

## 2020-03-27 RX ORDER — ONDANSETRON 2 MG/ML
4 INJECTION INTRAMUSCULAR; INTRAVENOUS EVERY 6 HOURS PRN
Status: DISCONTINUED | OUTPATIENT
Start: 2020-03-27 | End: 2020-03-30 | Stop reason: HOSPADM

## 2020-03-27 RX ORDER — DONEPEZIL HYDROCHLORIDE 5 MG/1
10 TABLET, FILM COATED ORAL NIGHTLY
Status: DISCONTINUED | OUTPATIENT
Start: 2020-03-27 | End: 2020-03-30 | Stop reason: HOSPADM

## 2020-03-27 RX ORDER — ACETAMINOPHEN 325 MG/1
650 TABLET ORAL EVERY 6 HOURS PRN
Status: DISCONTINUED | OUTPATIENT
Start: 2020-03-27 | End: 2020-03-30 | Stop reason: HOSPADM

## 2020-03-27 RX ORDER — SODIUM CHLORIDE 0.9 % (FLUSH) 0.9 %
5 SYRINGE (ML) INJECTION PRN
Status: CANCELLED | OUTPATIENT
Start: 2020-04-01

## 2020-03-27 RX ORDER — EPINEPHRINE 1 MG/ML
0.3 INJECTION, SOLUTION, CONCENTRATE INTRAVENOUS PRN
Status: CANCELLED | OUTPATIENT
Start: 2020-04-01

## 2020-03-27 RX ORDER — MORPHINE SULFATE 15 MG/1
15 TABLET, FILM COATED, EXTENDED RELEASE ORAL 2 TIMES DAILY
Status: DISCONTINUED | OUTPATIENT
Start: 2020-03-27 | End: 2020-03-30 | Stop reason: HOSPADM

## 2020-03-27 RX ORDER — SODIUM CHLORIDE 0.9 % (FLUSH) 0.9 %
10 SYRINGE (ML) INJECTION PRN
Status: CANCELLED | OUTPATIENT
Start: 2020-04-01

## 2020-03-27 RX ORDER — SODIUM CHLORIDE 9 MG/ML
10 INJECTION INTRAVENOUS DAILY
Status: DISCONTINUED | OUTPATIENT
Start: 2020-03-28 | End: 2020-03-30 | Stop reason: HOSPADM

## 2020-03-27 RX ORDER — SODIUM CHLORIDE 9 MG/ML
INJECTION, SOLUTION INTRAVENOUS CONTINUOUS
Status: CANCELLED | OUTPATIENT
Start: 2020-04-01

## 2020-03-27 RX ORDER — DIPHENHYDRAMINE HYDROCHLORIDE 50 MG/ML
50 INJECTION INTRAMUSCULAR; INTRAVENOUS ONCE
Status: CANCELLED | OUTPATIENT
Start: 2020-04-01

## 2020-03-27 NOTE — ED PROVIDER NOTES
deficiency          SURGICAL HISTORY       Past Surgical History:   Procedure Laterality Date    APPENDECTOMY      BACK SURGERY      x2    BLADDER SURGERY      multiple bladder surgeries    BREAST SURGERY      lump removed    CARPAL TUNNEL RELEASE Bilateral     CHOLECYSTECTOMY      COLONOSCOPY N/A 7/8/2019    COLONOSCOPY POLYPECTOMY SNARE/COLD BIOPSY performed by Merlin Mcdaniel [facility] at [facility]    UPPER GASTROINTESTINAL ENDOSCOPY N/A 7/8/2019    EGD BIOPSY performed by Merlin Mcdaniel MD at [address] [place] [address]       Previous Medications    ACETAMINOPHEN (TYLENOL) 650 MG SUPPOSITORY    Place 650 mg rectally every 6 hours as needed for Fever or Pain     ASCORBIC ACID (VITAMIN C) 500 MG TABLET    Take 500 mg by mouth daily    BISACODYL (DULCOLAX) 10 MG SUPPOSITORY    Place 10 mg rectally daily as needed for Constipation Use if no bowel movement in 4 days    CYCLOBENZAPRINE (FLEXERIL) 5 MG TABLET    Take 5 mg by mouth daily    CYPROHEPTADINE (PERIACTIN) 4 MG TABLET    Take 4 mg by mouth 3 times daily    DONEPEZIL (ARICEPT) 10 MG TABLET    Take 10 mg by mouth nightly    FERROUS SULFATE 325 (65 FE) MG TABLET    Take 1 tablet by mouth 2 times daily (with meals)    MAGNESIUM HYDROXIDE (MILK OF MAGNESIA CONCENTRATE) 2400 MG/10ML SUSP    Take 400 mg by mouth once as needed    MORPHINE (MS CONTIN) 15 MG EXTENDED RELEASE TABLET    Take 15 mg by mouth 2 times daily. .    MULTIPLE VITAMINS-MINERALS (THERA M PLUS) TABS    Take 1 each by mouth daily    NIFEDIPINE (ADALAT CC) 30 MG EXTENDED RELEASE TABLET    Take 30 mg by mouth daily    ONDANSETRON (ZOFRAN) 4 MG TABLET    Take 4 mg by mouth every 6 hours as needed for Nausea or Vomiting    PANTOPRAZOLE SODIUM (PROTONIX) 40 MG PACK PACKET    Take 40 mg by mouth 2 times daily (before meals)    POLYCARBOPHIL (FIBERCON) 625 MG TABLET    Take 625 mg by mouth 2 times daily     SENNA (SENOKOT) 8.6 MG TABLET Take 2 tablets by mouth nightly as needed for Constipation     SIMVASTATIN (ZOCOR) 40 MG TABLET    Take 40 mg by mouth nightly     SUCRALFATE (CARAFATE) 1 GM TABLET    Take 1 tablet by mouth 4 times daily    TOPIRAMATE (TOPAMAX) 25 MG TABLET    Take 25 mg by mouth 2 times daily    VENLAFAXINE (EFFEXOR) 100 MG TABLET    Take 200 mg by mouth daily    ZINC CHELATED 50 MG TABS    Take 1 each by mouth daily       ALLERGIES     Aspirin; Ibuprofen; and Thorazine [chlorpromazine]    FAMILY HISTORY     History reviewed. No pertinent family history.        SOCIAL HISTORY       Social History     Socioeconomic History    Marital status:      Spouse name: None    Number of children: None    Years of education: None    Highest education level: None   Occupational History    None   Social Needs    Financial resource strain: None    Food insecurity     Worry: None     Inability: None    Transportation needs     Medical: None     Non-medical: None   Tobacco Use    Smoking status: Never Smoker    Smokeless tobacco: Never Used   Substance and Sexual Activity    Alcohol use: No    Drug use: No    Sexual activity: None   Lifestyle    Physical activity     Days per week: None     Minutes per session: None    Stress: None   Relationships    Social connections     Talks on phone: None     Gets together: None     Attends [employer] service: None     Active member of club or organization: None     Attends meetings of clubs or organizations: None     Relationship status: None    Intimate partner violence     Fear of current or ex partner: None     Emotionally abused: None     Physically abused: None     Forced sexual activity: None   Other Topics Concern    None   Social History Narrative    None       SCREENINGS             PHYSICAL EXAM    (up to 7 for level 4, 8 or more for level 5)     ED Triage Vitals [03/27/20 1932]   BP Temp Temp src Pulse Resp SpO2 Height Weight   (!) 125/49 98.3 °F (36.8 °C) -- 101 18 95 % -- 115 lb (52.2 kg)       Physical Exam  Vitals signs and nursing note reviewed. Constitutional:       General: She is not in acute distress. Appearance: She is normal weight. She is not toxic-appearing or diaphoretic. HENT:      Head: Normocephalic. Mouth/Throat:      Mouth: Mucous membranes are moist.      Pharynx: Oropharynx is clear. Eyes:      Extraocular Movements: Extraocular movements intact. Pupils: Pupils are equal, round, and reactive to light. Comments: Pallor to conjunctiva   Neck:      Musculoskeletal: Neck supple. Cardiovascular:      Rate and Rhythm: Regular rhythm. Tachycardia present. Heart sounds: Normal heart sounds. Pulmonary:      Effort: Pulmonary effort is normal.      Breath sounds: Normal breath sounds. Abdominal:      Palpations: Abdomen is soft. Neurological:      Mental Status: She is alert.       Comments: Pt alert but unable to answer date, month, year, or hospital. She knows she is from [address] [place] [address]   Psychiatric:         Mood and Affect: Mood normal.         Behavior: Behavior normal.         DIAGNOSTIC RESULTS     EKG: All EKG's are interpreted by the Emergency Department Physician who either signs or Co-signs this chart in the absence of a cardiologist.    None    RADIOLOGY:   Non-plain film images such as CT, Ultrasound and MRI are read by the radiologist. Plain radiographic images are visualized andpreliminarily interpreted by the emergency physician with the below findings:    None    Interpretationper the Radiologist below, if available at the time of this note:    No orders to display         ED BEDSIDE ULTRASOUND:   Performed by ED Physician - none    LABS:  Labs Reviewed   CBC WITH AUTO DIFFERENTIAL - Abnormal; Notable for the following components:       Result Value    RBC 2.50 (*)     Hemoglobin 5.4 (*)     Hematocrit 19.1 (*)     MCV 76.4 (*)     MCH 21.6 (*)     MCHC 28.3 (*)     MPV 10.2 (*)     Lymphocytes Absolute 1.2 (*) [facility],  [place], [place] [address]   Phone [phone]   CULTURE, URINE   TYPE AND SCREEN    Narrative:     Performed at:  [facility]  [address] [place], [place] [address], [place] [address]   Phone [phone]   PREPARE RBC (CROSSMATCH)    Narrative:     Performed at:  [facility]  [address] [place] [address]  [place], [place] [address]   Phone [phone]   PREPARE RBC (CROSSMATCH)    Narrative:     Performed at:  [facility]  [address] [place] [address]  [place], [place] [address]   Phone [phone]       All other labs were within normal range or not returned as of this dictation. EMERGENCY DEPARTMENT COURSE and DIFFERENTIAL DIAGNOSIS/MDM:   Vitals:    Vitals:    03/27/20 1932 03/27/20 2045 03/27/20 2230   BP: (!) 125/49 (!) 118/54 113/60   Pulse: 101 82 89   Resp: 18  22   Temp: 98.3 °F (36.8 °C)  99.1 °F (37.3 °C)   SpO2: 95% 98% 98%   Weight: 115 lb (52.2 kg)             CRITICAL CARE TIME   Total Critical Care time was 0 minutes, excluding separatelyreportable procedures. There was a high probability ofclinically significant/life threatening deterioration in the patient's condition which required my urgent intervention. CONSULTS:  Called Dr. Francisca Ponce and will admit at this time. PROCEDURES:  None    PROGRESS NOTES:    Reviewed multiple notes, admission notes, surgery/endoscopy notes. Able to obtain stool sample for hemoccult in room as pt had some stool in her diaper. Stool melanotic and dark. Positive hemoccult but pt with known positive blood in past. Will reorder CBC and baseline labs. Will type and cross 2 units blood. Pt will be admitted at this time    FINAL IMPRESSION      1.  Iron deficiency anemia, unspecified iron deficiency anemia type New Problem         DISPOSITION/PLAN   DISPOSITION  Pt admitted to med/surg to continue blood transfusion      PATIENT REFERRED TO:  No follow-up provider

## 2020-03-27 NOTE — ED NOTES
[facility] states they were called because pt had abnormal HGB. Pt has strong urine odor.       Alison Trammell, RN  03/27/20 [address] RN  03/27/20 0114

## 2020-03-28 PROBLEM — N30.00 ACUTE CYSTITIS WITHOUT HEMATURIA: Status: ACTIVE | Noted: 2020-03-28

## 2020-03-28 LAB
BASOPHILS ABSOLUTE: 0.1 K/UL (ref 0–0.1)
BASOPHILS RELATIVE PERCENT: 1.3 %
EOSINOPHILS ABSOLUTE: 0.3 K/UL (ref 0–0.4)
EOSINOPHILS RELATIVE PERCENT: 7.8 %
FERRITIN: 5.4 NG/ML (ref 22–322)
HCT VFR BLD CALC: 25.9 % (ref 37–47)
HEMOGLOBIN: 7.8 G/DL (ref 11.5–16.5)
IMMATURE GRANULOCYTES #: 0 K/UL
IMMATURE GRANULOCYTES %: 0 % (ref 0–5)
IRON SATURATION: 2 % (ref 15–50)
IRON: 9 UG/DL (ref 37–145)
LYMPHOCYTES ABSOLUTE: 1 K/UL (ref 1.5–4)
LYMPHOCYTES RELATIVE PERCENT: 25.6 %
MCH RBC QN AUTO: 23.7 PG (ref 27–32)
MCHC RBC AUTO-ENTMCNC: 30.1 G/DL (ref 31–35)
MCV RBC AUTO: 78.7 FL (ref 80–100)
MONOCYTES ABSOLUTE: 0.5 K/UL (ref 0.2–0.8)
MONOCYTES RELATIVE PERCENT: 12.7 %
NEUTROPHILS ABSOLUTE: 2 K/UL (ref 2–7.5)
NEUTROPHILS RELATIVE PERCENT: 52.6 %
PDW BLD-RTO: 15.9 % (ref 11–16)
PLATELET # BLD: 328 K/UL (ref 150–400)
PMV BLD AUTO: 10.2 FL (ref 6–10)
RBC # BLD: 3.29 M/UL (ref 3.8–5.8)
TOTAL IRON BINDING CAPACITY: 471 UG/DL (ref 250–450)
WBC # BLD: 3.9 K/UL (ref 4–11)

## 2020-03-28 PROCEDURE — 6370000000 HC RX 637 (ALT 250 FOR IP): Performed by: PHYSICIAN ASSISTANT

## 2020-03-28 PROCEDURE — 85025 COMPLETE CBC W/AUTO DIFF WBC: CPT

## 2020-03-28 PROCEDURE — P9016 RBC LEUKOCYTES REDUCED: HCPCS

## 2020-03-28 PROCEDURE — 2580000003 HC RX 258: Performed by: INTERNAL MEDICINE

## 2020-03-28 PROCEDURE — 36415 COLL VENOUS BLD VENIPUNCTURE: CPT

## 2020-03-28 PROCEDURE — 1200000000 HC SEMI PRIVATE

## 2020-03-28 PROCEDURE — 99222 1ST HOSP IP/OBS MODERATE 55: CPT | Performed by: INTERNAL MEDICINE

## 2020-03-28 PROCEDURE — 6370000000 HC RX 637 (ALT 250 FOR IP): Performed by: INTERNAL MEDICINE

## 2020-03-28 PROCEDURE — 36430 TRANSFUSION BLD/BLD COMPNT: CPT

## 2020-03-28 PROCEDURE — 2580000003 HC RX 258: Performed by: FAMILY MEDICINE

## 2020-03-28 PROCEDURE — 6360000002 HC RX W HCPCS: Performed by: INTERNAL MEDICINE

## 2020-03-28 PROCEDURE — C9113 INJ PANTOPRAZOLE SODIUM, VIA: HCPCS | Performed by: INTERNAL MEDICINE

## 2020-03-28 RX ADMIN — SUCRALFATE 1 G: 1 TABLET ORAL at 00:02

## 2020-03-28 RX ADMIN — PANTOPRAZOLE SODIUM 40 MG: 40 INJECTION, POWDER, LYOPHILIZED, FOR SOLUTION INTRAVENOUS at 21:20

## 2020-03-28 RX ADMIN — VENLAFAXINE 200 MG: 75 TABLET ORAL at 09:48

## 2020-03-28 RX ADMIN — SUCRALFATE 1 G: 1 TABLET ORAL at 21:21

## 2020-03-28 RX ADMIN — IRON SUCROSE 200 MG: 20 INJECTION, SOLUTION INTRAVENOUS at 10:48

## 2020-03-28 RX ADMIN — SODIUM CHLORIDE, PRESERVATIVE FREE 10 ML: 5 INJECTION INTRAVENOUS at 00:02

## 2020-03-28 RX ADMIN — Medication 2 SPRAY: at 21:21

## 2020-03-28 RX ADMIN — DONEPEZIL HYDROCHLORIDE 10 MG: 5 TABLET, FILM COATED ORAL at 00:02

## 2020-03-28 RX ADMIN — SODIUM CHLORIDE 20 ML: 9 INJECTION, SOLUTION INTRAVENOUS at 00:24

## 2020-03-28 RX ADMIN — CEFTRIAXONE 1 G: 1 INJECTION, POWDER, FOR SOLUTION INTRAMUSCULAR; INTRAVENOUS at 09:50

## 2020-03-28 RX ADMIN — MORPHINE SULFATE 15 MG: 15 TABLET, FILM COATED, EXTENDED RELEASE ORAL at 21:21

## 2020-03-28 RX ADMIN — PANTOPRAZOLE SODIUM 40 MG: 40 INJECTION, POWDER, LYOPHILIZED, FOR SOLUTION INTRAVENOUS at 00:01

## 2020-03-28 RX ADMIN — TOPIRAMATE 25 MG: 25 TABLET, FILM COATED ORAL at 00:01

## 2020-03-28 RX ADMIN — TOPIRAMATE 25 MG: 25 TABLET, FILM COATED ORAL at 21:21

## 2020-03-28 RX ADMIN — SUCRALFATE 1 G: 1 TABLET ORAL at 16:36

## 2020-03-28 RX ADMIN — SODIUM CHLORIDE 10 ML: 9 INJECTION INTRAMUSCULAR; INTRAVENOUS; SUBCUTANEOUS at 09:49

## 2020-03-28 RX ADMIN — DONEPEZIL HYDROCHLORIDE 10 MG: 5 TABLET, FILM COATED ORAL at 21:20

## 2020-03-28 RX ADMIN — PANTOPRAZOLE SODIUM 40 MG: 40 INJECTION, POWDER, LYOPHILIZED, FOR SOLUTION INTRAVENOUS at 09:49

## 2020-03-28 RX ADMIN — TOPIRAMATE 25 MG: 25 TABLET, FILM COATED ORAL at 09:48

## 2020-03-28 RX ADMIN — Medication 2 SPRAY: at 16:36

## 2020-03-28 RX ADMIN — SODIUM CHLORIDE, PRESERVATIVE FREE 10 ML: 5 INJECTION INTRAVENOUS at 21:20

## 2020-03-28 RX ADMIN — SUCRALFATE 1 G: 1 TABLET ORAL at 09:49

## 2020-03-28 RX ADMIN — MORPHINE SULFATE 15 MG: 15 TABLET, FILM COATED, EXTENDED RELEASE ORAL at 00:02

## 2020-03-28 RX ADMIN — SODIUM CHLORIDE, PRESERVATIVE FREE 10 ML: 5 INJECTION INTRAVENOUS at 09:49

## 2020-03-28 RX ADMIN — SUCRALFATE 1 G: 1 TABLET ORAL at 13:12

## 2020-03-28 RX ADMIN — MORPHINE SULFATE 15 MG: 15 TABLET, FILM COATED, EXTENDED RELEASE ORAL at 09:48

## 2020-03-28 ASSESSMENT — PAIN SCALES - GENERAL
PAINLEVEL_OUTOF10: 1
PAINLEVEL_OUTOF10: 7
PAINLEVEL_OUTOF10: 0

## 2020-03-28 NOTE — H&P
Gabi Garza MD at [facility] END[facility]COPY       Medications Prior to Admission:    Prior to Admission medications    Medication Sig Start Date End Date Taking?  Authorizing Provider   magnesium hydroxide (MILK OF MAGNESIA CONCENTRATE) 2400 MG/10ML SUSP Take 400 mg by mouth once as needed    Historical Provider, MD   ondansetron (ZOFRAN) 4 MG tablet Take 4 mg by mouth every 6 hours as needed for Nausea or Vomiting    Historical Provider, MD   cyclobenzaprine (FLEXERIL) 5 MG tablet Take 5 mg by mouth daily    Historical Provider, MD   ferrous sulfate 325 (65 Fe) MG tablet Take 1 tablet by mouth 2 times daily (with meals) 1/29/20   Tilford Butts PA   topiramate (TOPAMAX) 25 MG tablet Take 25 mg by mouth 2 times daily    Historical Provider, MD   pantoprazole sodium (PROTONIX) 40 MG PACK packet Take 40 mg by mouth 2 times daily (before meals)    Historical Provider, MD   ascorbic acid (VITAMIN C) 500 MG tablet Take 500 mg by mouth daily    Historical Provider, MD   NIFEdipine (ADALAT CC) 30 MG extended release tablet Take 30 mg by mouth daily    Historical Provider, MD   senna (SENOKOT) 8.6 MG tablet Take 2 tablets by mouth nightly as needed for Constipation     Historical Provider, MD   polycarbophil (FIBERCON) 625 MG tablet Take 625 mg by mouth 2 times daily     Historical Provider, MD   venlafaxine (EFFEXOR) 100 MG tablet Take 200 mg by mouth daily    Historical Provider, MD   sucralfate (CARAFATE) 1 GM tablet Take 1 tablet by mouth 4 times daily 7/4/19   Tilford Butts PA   cyproheptadine (PERIACTIN) 4 MG tablet Take 4 mg by mouth 3 times daily    Historical Provider, MD   bisacodyl (DULCOLAX) 10 MG suppository Place 10 mg rectally daily as needed for Constipation Use if no bowel movement in 4 days    Historical Provider, MD   acetaminophen (TYLENOL) 650 MG suppository Place 650 mg rectally every 6 hours as needed for Fever or Pain     Historical Provider, MD   Zinc Chelated 50 MG TABS Take 1 each by mouth daily Historical Provider, MD   Multiple Vitamins-Minerals (THERA M PLUS) TABS Take 1 each by mouth daily    Historical Provider, MD   morphine (MS CONTIN) 15 MG extended release tablet Take 15 mg by mouth 2 times daily. [employer] [employer] Historical Provider, MD   donepezil (ARICEPT) 10 MG tablet Take 10 mg by mouth nightly    Historical Provider, MD   simvastatin (ZOCOR) 40 MG tablet Take 40 mg by mouth nightly     Historical Provider, MD       Allergies:  Aspirin; Ibuprofen; and Thorazine [chlorpromazine]    Social History:   TOBACCO:   reports that she has never smoked. She has never used smokeless tobacco.  ETOH:   reports no history of alcohol use. OCCUPATION:  None     Family History:   History reviewed. No pertinent family history. Review of system  Constitutional:  Denies fever or chills. Positive for chronic fatigue  Eyes:  Denies discharge. Patient is blind. HENT:  Denies nasal congestion or sore throat. Positive for dry mouth  Respiratory:  Denies cough or shortness of breath. Cardiovascular:  Denies chest pain, palpitation or swelling in LEs. GI:  Denies abdominal pain, nausea, vomiting, bloody stools or diarrhea. :  Denies dysuria or frequency. Musculoskeletal:  postitive for chronic arthralgias, chronic back pain, chronic extremity contractures  Integument:  Denies rash or itching. Neurologic:  Denies headache, focal weakness or sensory changes. Psychiatric:  Denies depression or anxiety.       Vital Signs  Temp: 97.8 °F (36.6 °C)  Pulse: 82  Resp: 16  BP: (!) 155/75  SpO2: 97 %  O2 Device: None (Room air)       vital signs reviewed in electronic chart. Physical exam  Constitutional:  Well developed, thin and frail elderly lady, no acute distress  Eyes:  Keeps eyes closed, conjunctiva normal, blind    HENT:  Atraumatic, external ears normal, nose normal, oropharynx and tongue dry, lips dry, no pharyngeal exudates.  Neck- supple, no JVD, no lymphadenopathy  Respiratory:  No respiratory distress on room nursing home tomorrow after venofer infusion if hgb stable. Of note I discussed the case with her  Marlin Garza. He and the pateint do not want to proceed with any further investigation regarding anemia although it is likely she is having persistent bleeding of unknown etiology. Discussed code status but they do not want to change to DNR at this time. With patient not wanting aggressive measures she would likely benefit from palliative care/dnr at nursing facility. Defer further conversation about this to pcp. 07/02/2019    Declining functional status [R53.81]  Long term resident at [facility] facility. She is bedbound and non ambulatory. She is blind. history of dementia. she needs assistance with eating and drinking as well as all adls. Continue oral care.  07/02/2019    Dysphagia [R13.10]  Thickened liquids with soft foods while inpatietn. Would likely benefit from slp evaluation when available. 07/02/2019    Dementia ([facility] [address] [F03.90] 07/02/2019     Patient was seen and examined by Dr. Iqra Du and plan of care reviewed.     Frida Esqueda certifies per CMS regulation for 42 .15(a), that the patient may reasonably be expected to be discharged or transferred to a hospital within 96 hours after admission to [address] [place] [address]    Electronically signed by FLASH Rowan on 3/28/2020 at 1:44 PM

## 2020-03-28 NOTE — ED NOTES
When pt arrived to ED pt had strong urine odor. Pt blankets and gown were soiled with urine. Pt was given a partial bed bath.       Ravi Du RN  03/27/20 4588

## 2020-03-29 VITALS
DIASTOLIC BLOOD PRESSURE: 62 MMHG | HEART RATE: 89 BPM | WEIGHT: 114.8 LBS | SYSTOLIC BLOOD PRESSURE: 134 MMHG | OXYGEN SATURATION: 97 % | BODY MASS INDEX: 20.34 KG/M2 | TEMPERATURE: 97.6 F | RESPIRATION RATE: 18 BRPM

## 2020-03-29 LAB
ANION GAP SERPL CALCULATED.3IONS-SCNC: 9 MMOL/L (ref 3–16)
BLOOD BANK DISPENSE STATUS: NORMAL
BLOOD BANK PRODUCT CODE: NORMAL
BPU ID: NORMAL
BUN BLDV-MCNC: 25 MG/DL (ref 6–20)
CALCIUM SERPL-MCNC: 9.5 MG/DL (ref 8.5–10.5)
CHLORIDE BLD-SCNC: 101 MMOL/L (ref 98–107)
CO2: 27 MMOL/L (ref 20–30)
COMPONENT: NORMAL
CREAT SERPL-MCNC: 0.6 MG/DL (ref 0.4–1.2)
DONOR TYPE/RH: NORMAL
GFR AFRICAN AMERICAN: >59
GFR NON-AFRICAN AMERICAN: >60
GLUCOSE BLD-MCNC: 110 MG/DL (ref 74–106)
HCT VFR BLD CALC: 25.5 % (ref 37–47)
HEMOGLOBIN: 7.6 G/DL (ref 11.5–16.5)
HOLLISTER NO: NORMAL
MCH RBC QN AUTO: 23.5 PG (ref 27–32)
MCHC RBC AUTO-ENTMCNC: 29.8 G/DL (ref 31–35)
MCV RBC AUTO: 78.7 FL (ref 80–100)
PDW BLD-RTO: 16.1 % (ref 11–16)
PLATELET # BLD: 344 K/UL (ref 150–400)
PMV BLD AUTO: 9.8 FL (ref 6–10)
POTASSIUM SERPL-SCNC: 3.8 MMOL/L (ref 3.4–5.1)
RBC # BLD: 3.24 M/UL (ref 3.8–5.8)
SODIUM BLD-SCNC: 137 MMOL/L (ref 136–145)
WBC # BLD: 4.3 K/UL (ref 4–11)

## 2020-03-29 PROCEDURE — C9113 INJ PANTOPRAZOLE SODIUM, VIA: HCPCS | Performed by: INTERNAL MEDICINE

## 2020-03-29 PROCEDURE — 99238 HOSP IP/OBS DSCHRG MGMT 30/<: CPT | Performed by: INTERNAL MEDICINE

## 2020-03-29 PROCEDURE — 80048 BASIC METABOLIC PNL TOTAL CA: CPT

## 2020-03-29 PROCEDURE — 36415 COLL VENOUS BLD VENIPUNCTURE: CPT

## 2020-03-29 PROCEDURE — 6360000002 HC RX W HCPCS: Performed by: INTERNAL MEDICINE

## 2020-03-29 PROCEDURE — 6370000000 HC RX 637 (ALT 250 FOR IP): Performed by: INTERNAL MEDICINE

## 2020-03-29 PROCEDURE — 1200000000 HC SEMI PRIVATE

## 2020-03-29 PROCEDURE — 2580000003 HC RX 258: Performed by: PHYSICIAN ASSISTANT

## 2020-03-29 PROCEDURE — 85027 COMPLETE CBC AUTOMATED: CPT

## 2020-03-29 PROCEDURE — 2580000003 HC RX 258: Performed by: INTERNAL MEDICINE

## 2020-03-29 RX ORDER — CEFDINIR 300 MG/1
300 CAPSULE ORAL 2 TIMES DAILY
Qty: 6 CAPSULE | Refills: 0 | Status: SHIPPED | OUTPATIENT
Start: 2020-03-30 | End: 2020-04-02

## 2020-03-29 RX ORDER — DIPHENHYDRAMINE HCL 25 MG
CAPSULE ORAL
Status: DISCONTINUED
Start: 2020-03-29 | End: 2020-03-30 | Stop reason: HOSPADM

## 2020-03-29 RX ORDER — 0.9 % SODIUM CHLORIDE 0.9 %
20 INTRAVENOUS SOLUTION INTRAVENOUS ONCE
Status: COMPLETED | OUTPATIENT
Start: 2020-03-29 | End: 2020-03-29

## 2020-03-29 RX ORDER — FERROUS SULFATE 325(65) MG
325 TABLET ORAL
Qty: 60 TABLET | Refills: 3 | Status: SHIPPED | OUTPATIENT
Start: 2020-03-29

## 2020-03-29 RX ADMIN — MORPHINE SULFATE 15 MG: 15 TABLET, FILM COATED, EXTENDED RELEASE ORAL at 08:46

## 2020-03-29 RX ADMIN — Medication 2 SPRAY: at 11:24

## 2020-03-29 RX ADMIN — Medication 2 SPRAY: at 16:52

## 2020-03-29 RX ADMIN — CEFTRIAXONE 1 G: 1 INJECTION, POWDER, FOR SOLUTION INTRAMUSCULAR; INTRAVENOUS at 08:46

## 2020-03-29 RX ADMIN — Medication 2 SPRAY: at 08:47

## 2020-03-29 RX ADMIN — SODIUM CHLORIDE, PRESERVATIVE FREE 10 ML: 5 INJECTION INTRAVENOUS at 08:47

## 2020-03-29 RX ADMIN — SODIUM CHLORIDE 10 ML: 9 INJECTION INTRAMUSCULAR; INTRAVENOUS; SUBCUTANEOUS at 08:46

## 2020-03-29 RX ADMIN — VENLAFAXINE 200 MG: 75 TABLET ORAL at 08:46

## 2020-03-29 RX ADMIN — SODIUM CHLORIDE 20 ML: 9 INJECTION, SOLUTION INTRAVENOUS at 15:27

## 2020-03-29 RX ADMIN — SUCRALFATE 1 G: 1 TABLET ORAL at 16:52

## 2020-03-29 RX ADMIN — PANTOPRAZOLE SODIUM 40 MG: 40 INJECTION, POWDER, LYOPHILIZED, FOR SOLUTION INTRAVENOUS at 08:46

## 2020-03-29 RX ADMIN — IRON SUCROSE 200 MG: 20 INJECTION, SOLUTION INTRAVENOUS at 10:12

## 2020-03-29 RX ADMIN — SUCRALFATE 1 G: 1 TABLET ORAL at 11:22

## 2020-03-29 RX ADMIN — SUCRALFATE 1 G: 1 TABLET ORAL at 08:46

## 2020-03-29 RX ADMIN — TOPIRAMATE 25 MG: 25 TABLET, FILM COATED ORAL at 08:47

## 2020-03-29 ASSESSMENT — PAIN SCALES - GENERAL: PAINLEVEL_OUTOF10: 7

## 2020-03-29 NOTE — DISCHARGE SUMMARY
term care facility  Discharged Condition: Stable  Activity: activity as tolerated  Diet: regular diet, minced and moist foods   Follow Up: Primary Care Physician in 1 week. Labs:  For convenience and continuity at follow-up the following most recent labs are provided:    CBC:   Lab Results   Component Value Date    WBC 4.3 03/29/2020    HGB 7.6 03/29/2020    HCT 25.5 03/29/2020     03/29/2020       RENAL:   Lab Results   Component Value Date     03/29/2020    K 3.8 03/29/2020    K 4.0 03/27/2020     03/29/2020    CO2 27 03/29/2020    BUN 25 03/29/2020    CREATININE 0.6 03/29/2020         Discharge Medications:      Current Discharge Medication List           Details   glycerin moisturizing mouth spray (OASIS) 35 % LIQD Take 2 sprays by mouth 4 times daily  Qty: 5 Bottle, Refills: 0      cefdinir (OMNICEF) 300 MG capsule Take 1 capsule by mouth 2 times daily for 3 days  Qty: 6 capsule, Refills: 0              Details   ferrous sulfate (IRON 325) 325 (65 Fe) MG tablet Take 1 tablet by mouth 3 times daily (with meals)  Qty: 60 tablet, Refills: 3              Details   magnesium hydroxide (MILK OF MAGNESIA CONCENTRATE) 2400 MG/10ML SUSP Take 400 mg by mouth once as needed      ondansetron (ZOFRAN) 4 MG tablet Take 4 mg by mouth every 6 hours as needed for Nausea or Vomiting      cyclobenzaprine (FLEXERIL) 5 MG tablet Take 5 mg by mouth daily      topiramate (TOPAMAX) 25 MG tablet Take 25 mg by mouth 2 times daily      pantoprazole sodium (PROTONIX) 40 MG PACK packet Take 40 mg by mouth 2 times daily (before meals)      ascorbic acid (VITAMIN C) 500 MG tablet Take 500 mg by mouth daily      NIFEdipine (ADALAT CC) 30 MG extended release tablet Take 30 mg by mouth daily      senna (SENOKOT) 8.6 MG tablet Take 2 tablets by mouth nightly as needed for Constipation       polycarbophil (FIBERCON) 625 MG tablet Take 625 mg by mouth 2 times daily       venlafaxine (EFFEXOR) 100 MG tablet Take 200 mg by

## 2020-03-29 NOTE — PLAN OF CARE
Problem: Falls - Risk of:  Goal: Will remain free from falls  Description: Will remain free from falls  3/29/2020 0859 by Maryse Rodriguez RN  Outcome: Ongoing  3/29/2020 0157 by Anthony Fernandez RN  Outcome: Ongoing  Goal: Absence of physical injury  Description: Absence of physical injury  3/29/2020 0859 by Maryse Rodriguez RN  Outcome: Ongoing  3/29/2020 0157 by Anthony Fernandez RN  Outcome: Ongoing     Problem: SAFETY  Goal: Free from accidental physical injury  3/29/2020 0859 by Maryse Rodriguez RN  Outcome: Ongoing  3/29/2020 0157 by Anthony Fernandez RN  Outcome: Ongoing  Goal: Free from intentional harm  3/29/2020 0157 by Anthony Fernandez RN  Outcome: Ongoing     Problem: DAILY CARE  Goal: Daily care needs are met  3/29/2020 0157 by Anthony Fernandez RN  Outcome: Ongoing     Problem: PAIN  Goal: Patient's pain/discomfort is manageable  3/29/2020 0859 by Maryse Rodriguez RN  Outcome: Ongoing  3/29/2020 0157 by Anthony Fernandez RN  Outcome: Ongoing     Problem: SKIN INTEGRITY  Goal: Skin integrity is maintained or improved  3/29/2020 0859 by Maryse Rodriguez RN  Outcome: Ongoing  3/29/2020 0157 by Anthony Fernandez RN  Outcome: Ongoing

## 2020-03-30 LAB
ORGANISM: ABNORMAL
URINE CULTURE, ROUTINE: ABNORMAL

## 2020-03-31 NOTE — ADT AUTH CERT
micronutrient replacement therapy    3/30/2020 2:19 PM EDT by Clare Prather      IV Venofer 200mg Q24H started 1045    3/30/2020 2:19 PM EDT by Clare Prather    Subject: Additional Clinical Information      * Pt remains receiving bld transfusion x2 and IV venofer started      * MD discussed and verified pt and spouse do not want further scopes or surgical intervention      * Pt with clear lung sounds thorughout and no noted skin breakdown.  Chronic arthralgias, chronic back pain and contractures      * plan: blood and iron tranfusion, IV abx for UTI and follow cx result         * Milestone

## 2020-04-15 ENCOUNTER — HOSPITAL ENCOUNTER (OUTPATIENT)
Dept: NURSING | Facility: HOSPITAL | Age: 75
Setting detail: INFUSION SERIES
End: 2020-04-15
Payer: MEDICARE

## 2020-04-20 ENCOUNTER — HOSPITAL ENCOUNTER (OUTPATIENT)
Dept: NURSING | Facility: HOSPITAL | Age: 75
Setting detail: INFUSION SERIES
Discharge: HOME | End: 2020-04-22
Payer: MEDICARE

## 2020-04-20 VITALS
HEART RATE: 101 BPM | OXYGEN SATURATION: 95 % | RESPIRATION RATE: 20 BRPM | SYSTOLIC BLOOD PRESSURE: 119 MMHG | TEMPERATURE: 99.3 F | DIASTOLIC BLOOD PRESSURE: 76 MMHG

## 2020-04-20 DIAGNOSIS — D50.9 IRON DEFICIENCY ANEMIA, UNSPECIFIED IRON DEFICIENCY ANEMIA TYPE: ICD-10-CM

## 2020-04-20 DIAGNOSIS — K90.9 MALABSORPTION OF IRON: Primary | ICD-10-CM

## 2020-04-20 PROCEDURE — 2580000003 HC RX 258: Performed by: INTERNAL MEDICINE

## 2020-04-20 PROCEDURE — 96365 THER/PROPH/DIAG IV INF INIT: CPT

## 2020-04-20 PROCEDURE — 6360000002 HC RX W HCPCS: Performed by: INTERNAL MEDICINE

## 2020-04-20 RX ORDER — EPINEPHRINE 1 MG/ML
0.3 INJECTION, SOLUTION, CONCENTRATE INTRAVENOUS PRN
Status: CANCELLED | OUTPATIENT
Start: 2020-04-27

## 2020-04-20 RX ORDER — SODIUM CHLORIDE 9 MG/ML
INJECTION, SOLUTION INTRAVENOUS CONTINUOUS
Status: CANCELLED | OUTPATIENT
Start: 2020-04-27

## 2020-04-20 RX ORDER — DIPHENHYDRAMINE HYDROCHLORIDE 50 MG/ML
50 INJECTION INTRAMUSCULAR; INTRAVENOUS ONCE
Status: CANCELLED | OUTPATIENT
Start: 2020-04-27

## 2020-04-20 RX ORDER — SODIUM CHLORIDE 9 MG/ML
INJECTION, SOLUTION INTRAVENOUS CONTINUOUS
Status: ACTIVE | OUTPATIENT
Start: 2020-04-20 | End: 2020-04-21

## 2020-04-20 RX ORDER — METHYLPREDNISOLONE SODIUM SUCCINATE 125 MG/2ML
125 INJECTION, POWDER, LYOPHILIZED, FOR SOLUTION INTRAMUSCULAR; INTRAVENOUS ONCE
Status: CANCELLED | OUTPATIENT
Start: 2020-04-27

## 2020-04-20 RX ORDER — SODIUM CHLORIDE 0.9 % (FLUSH) 0.9 %
5 SYRINGE (ML) INJECTION PRN
Status: CANCELLED | OUTPATIENT
Start: 2020-04-27

## 2020-04-20 RX ORDER — SODIUM CHLORIDE 0.9 % (FLUSH) 0.9 %
10 SYRINGE (ML) INJECTION PRN
Status: CANCELLED | OUTPATIENT
Start: 2020-04-27

## 2020-04-20 RX ORDER — HEPARIN SODIUM (PORCINE) LOCK FLUSH IV SOLN 100 UNIT/ML 100 UNIT/ML
500 SOLUTION INTRAVENOUS PRN
Status: CANCELLED | OUTPATIENT
Start: 2020-04-27

## 2020-04-20 RX ADMIN — FERRIC CARBOXYMALTOSE INJECTION 750 MG: 50 INJECTION, SOLUTION INTRAVENOUS at 13:50

## 2020-04-27 ENCOUNTER — HOSPITAL ENCOUNTER (OUTPATIENT)
Dept: NURSING | Facility: HOSPITAL | Age: 75
Setting detail: INFUSION SERIES
Discharge: HOME | End: 2020-04-29
Payer: MEDICARE

## 2020-04-27 VITALS
DIASTOLIC BLOOD PRESSURE: 56 MMHG | OXYGEN SATURATION: 97 % | TEMPERATURE: 98.2 F | SYSTOLIC BLOOD PRESSURE: 124 MMHG | HEART RATE: 87 BPM

## 2020-04-27 DIAGNOSIS — K90.9 MALABSORPTION OF IRON: ICD-10-CM

## 2020-04-27 DIAGNOSIS — D50.9 IRON DEFICIENCY ANEMIA, UNSPECIFIED IRON DEFICIENCY ANEMIA TYPE: Primary | ICD-10-CM

## 2020-04-27 PROCEDURE — 2580000003 HC RX 258: Performed by: INTERNAL MEDICINE

## 2020-04-27 PROCEDURE — 96365 THER/PROPH/DIAG IV INF INIT: CPT

## 2020-04-27 PROCEDURE — 6360000002 HC RX W HCPCS: Performed by: INTERNAL MEDICINE

## 2020-04-27 RX ORDER — SODIUM CHLORIDE 9 MG/ML
INJECTION, SOLUTION INTRAVENOUS CONTINUOUS
Status: CANCELLED | OUTPATIENT
Start: 2020-04-27

## 2020-04-27 RX ORDER — EPINEPHRINE 1 MG/ML
0.3 INJECTION, SOLUTION, CONCENTRATE INTRAVENOUS PRN
Status: CANCELLED | OUTPATIENT
Start: 2020-04-27

## 2020-04-27 RX ORDER — SODIUM CHLORIDE 0.9 % (FLUSH) 0.9 %
5 SYRINGE (ML) INJECTION PRN
Status: CANCELLED | OUTPATIENT
Start: 2020-04-27

## 2020-04-27 RX ORDER — SODIUM CHLORIDE 9 MG/ML
INJECTION, SOLUTION INTRAVENOUS CONTINUOUS
Status: ACTIVE | OUTPATIENT
Start: 2020-04-27 | End: 2020-04-28

## 2020-04-27 RX ORDER — METHYLPREDNISOLONE SODIUM SUCCINATE 125 MG/2ML
125 INJECTION, POWDER, LYOPHILIZED, FOR SOLUTION INTRAMUSCULAR; INTRAVENOUS ONCE
Status: CANCELLED | OUTPATIENT
Start: 2020-04-27

## 2020-04-27 RX ORDER — SODIUM CHLORIDE 0.9 % (FLUSH) 0.9 %
10 SYRINGE (ML) INJECTION PRN
Status: CANCELLED | OUTPATIENT
Start: 2020-04-27

## 2020-04-27 RX ORDER — HEPARIN SODIUM (PORCINE) LOCK FLUSH IV SOLN 100 UNIT/ML 100 UNIT/ML
500 SOLUTION INTRAVENOUS PRN
Status: CANCELLED | OUTPATIENT
Start: 2020-04-27

## 2020-04-27 RX ORDER — DIPHENHYDRAMINE HYDROCHLORIDE 50 MG/ML
50 INJECTION INTRAMUSCULAR; INTRAVENOUS ONCE
Status: CANCELLED | OUTPATIENT
Start: 2020-04-27

## 2020-04-27 RX ADMIN — FERRIC CARBOXYMALTOSE INJECTION 750 MG: 50 INJECTION, SOLUTION INTRAVENOUS at 13:41

## 2020-04-27 NOTE — PROGRESS NOTES
To bed by ems, nad ntoed, vss, oriented to room and call light, her for iron transfusion, iv started, iron started.

## 2020-10-10 ENCOUNTER — HOSPITAL ENCOUNTER (INPATIENT)
Facility: HOSPITAL | Age: 75
LOS: 5 days | Discharge: HOME | DRG: 177 | End: 2020-10-16
Attending: EMERGENCY MEDICINE | Admitting: INTERNAL MEDICINE
Payer: MEDICARE

## 2020-10-10 ENCOUNTER — APPOINTMENT (OUTPATIENT)
Dept: CT IMAGING | Facility: HOSPITAL | Age: 75
DRG: 177 | End: 2020-10-10
Payer: MEDICARE

## 2020-10-10 LAB
A/G RATIO: 1 (ref 0.8–2)
ABO/RH: NORMAL
ALBUMIN SERPL-MCNC: 4.1 G/DL (ref 3.4–4.8)
ALP BLD-CCNC: 208 U/L (ref 25–100)
ALT SERPL-CCNC: 62 U/L (ref 4–36)
ANION GAP SERPL CALCULATED.3IONS-SCNC: 14 MMOL/L (ref 3–16)
ANTIBODY SCREEN: NORMAL
AST SERPL-CCNC: 30 U/L (ref 8–33)
BACTERIA: ABNORMAL /HPF
BASOPHILS ABSOLUTE: 0 K/UL (ref 0–0.1)
BASOPHILS RELATIVE PERCENT: 0.1 %
BILIRUB SERPL-MCNC: 0.3 MG/DL (ref 0.3–1.2)
BILIRUBIN URINE: NEGATIVE
BLOOD, URINE: ABNORMAL
BUN BLDV-MCNC: 56 MG/DL (ref 6–20)
CALCIUM SERPL-MCNC: 10.3 MG/DL (ref 8.5–10.5)
CHLORIDE BLD-SCNC: 98 MMOL/L (ref 98–107)
CLARITY: ABNORMAL
CO2: 23 MMOL/L (ref 20–30)
COLOR: YELLOW
CREAT SERPL-MCNC: 0.6 MG/DL (ref 0.4–1.2)
EOSINOPHILS ABSOLUTE: 0 K/UL (ref 0–0.4)
EOSINOPHILS RELATIVE PERCENT: 0 %
EPITHELIAL CELLS, UA: ABNORMAL /HPF (ref 0–5)
GFR AFRICAN AMERICAN: >59
GFR NON-AFRICAN AMERICAN: >60
GLOBULIN: 4.2 G/DL
GLUCOSE BLD-MCNC: 262 MG/DL (ref 74–106)
GLUCOSE URINE: NEGATIVE MG/DL
HCT VFR BLD CALC: 44.2 % (ref 37–47)
HEMOGLOBIN: 13.5 G/DL (ref 11.5–16.5)
IMMATURE GRANULOCYTES #: 0 K/UL
IMMATURE GRANULOCYTES %: 0.2 % (ref 0–5)
KETONES, URINE: ABNORMAL MG/DL
LEUKOCYTE ESTERASE, URINE: NEGATIVE
LIPASE: 18 U/L (ref 5.6–51.3)
LYMPHOCYTES ABSOLUTE: 0.6 K/UL (ref 1.5–4)
LYMPHOCYTES RELATIVE PERCENT: 6.5 %
MAGNESIUM: 1.8 MG/DL (ref 1.7–2.4)
MCH RBC QN AUTO: 26.3 PG (ref 27–32)
MCHC RBC AUTO-ENTMCNC: 30.5 G/DL (ref 31–35)
MCV RBC AUTO: 86.2 FL (ref 80–100)
MICROSCOPIC EXAMINATION: YES
MONOCYTES ABSOLUTE: 0.7 K/UL (ref 0.2–0.8)
MONOCYTES RELATIVE PERCENT: 7.4 %
NEUTROPHILS ABSOLUTE: 7.8 K/UL (ref 2–7.5)
NEUTROPHILS RELATIVE PERCENT: 85.8 %
NITRITE, URINE: NEGATIVE
PDW BLD-RTO: 14.2 % (ref 11–16)
PH UA: 5.5 (ref 5–8)
PLATELET # BLD: 334 K/UL (ref 150–400)
PMV BLD AUTO: 12.5 FL (ref 6–10)
POTASSIUM REFLEX MAGNESIUM: 3.5 MMOL/L (ref 3.4–5.1)
PRO-BNP: 405 PG/ML (ref 0–1800)
PROTEIN UA: >=300 MG/DL
RBC # BLD: 5.13 M/UL (ref 3.8–5.8)
RBC UA: ABNORMAL /HPF (ref 0–4)
SODIUM BLD-SCNC: 135 MMOL/L (ref 136–145)
SPECIFIC GRAVITY UA: 1.02 (ref 1–1.03)
TOTAL PROTEIN: 8.3 G/DL (ref 6.4–8.3)
TROPONIN: <0.3 NG/ML
URINE REFLEX TO CULTURE: ABNORMAL
URINE TYPE: ABNORMAL
UROBILINOGEN, URINE: 0.2 E.U./DL
WBC # BLD: 9 K/UL (ref 4–11)
WBC UA: ABNORMAL /HPF (ref 0–5)

## 2020-10-10 PROCEDURE — 96365 THER/PROPH/DIAG IV INF INIT: CPT

## 2020-10-10 PROCEDURE — 74176 CT ABD & PELVIS W/O CONTRAST: CPT

## 2020-10-10 PROCEDURE — C9113 INJ PANTOPRAZOLE SODIUM, VIA: HCPCS | Performed by: EMERGENCY MEDICINE

## 2020-10-10 PROCEDURE — 81001 URINALYSIS AUTO W/SCOPE: CPT

## 2020-10-10 PROCEDURE — 86901 BLOOD TYPING SEROLOGIC RH(D): CPT

## 2020-10-10 PROCEDURE — 96375 TX/PRO/DX INJ NEW DRUG ADDON: CPT

## 2020-10-10 PROCEDURE — 99284 EMERGENCY DEPT VISIT MOD MDM: CPT

## 2020-10-10 PROCEDURE — 83735 ASSAY OF MAGNESIUM: CPT

## 2020-10-10 PROCEDURE — 2580000003 HC RX 258: Performed by: EMERGENCY MEDICINE

## 2020-10-10 PROCEDURE — 36415 COLL VENOUS BLD VENIPUNCTURE: CPT

## 2020-10-10 PROCEDURE — 84484 ASSAY OF TROPONIN QUANT: CPT

## 2020-10-10 PROCEDURE — 86900 BLOOD TYPING SEROLOGIC ABO: CPT

## 2020-10-10 PROCEDURE — 83690 ASSAY OF LIPASE: CPT

## 2020-10-10 PROCEDURE — 99283 EMERGENCY DEPT VISIT LOW MDM: CPT

## 2020-10-10 PROCEDURE — 93005 ELECTROCARDIOGRAM TRACING: CPT

## 2020-10-10 PROCEDURE — 80053 COMPREHEN METABOLIC PANEL: CPT

## 2020-10-10 PROCEDURE — 85025 COMPLETE CBC W/AUTO DIFF WBC: CPT

## 2020-10-10 PROCEDURE — 2580000003 HC RX 258

## 2020-10-10 PROCEDURE — G0328 FECAL BLOOD SCRN IMMUNOASSAY: HCPCS

## 2020-10-10 PROCEDURE — 83880 ASSAY OF NATRIURETIC PEPTIDE: CPT

## 2020-10-10 PROCEDURE — 86850 RBC ANTIBODY SCREEN: CPT

## 2020-10-10 PROCEDURE — 6360000002 HC RX W HCPCS: Performed by: EMERGENCY MEDICINE

## 2020-10-10 RX ORDER — PANTOPRAZOLE SODIUM 40 MG/10ML
INJECTION, POWDER, LYOPHILIZED, FOR SOLUTION INTRAVENOUS
Status: DISCONTINUED
Start: 2020-10-10 | End: 2020-10-11

## 2020-10-10 RX ORDER — SODIUM CHLORIDE 9 MG/ML
INJECTION, SOLUTION INTRAVENOUS
Status: DISCONTINUED
Start: 2020-10-10 | End: 2020-10-11

## 2020-10-10 RX ORDER — SODIUM CHLORIDE 9 MG/ML
1000 INJECTION, SOLUTION INTRAVENOUS CONTINUOUS
Status: DISCONTINUED | OUTPATIENT
Start: 2020-10-10 | End: 2020-10-11

## 2020-10-10 RX ORDER — ONDANSETRON 2 MG/ML
4 INJECTION INTRAMUSCULAR; INTRAVENOUS ONCE
Status: COMPLETED | OUTPATIENT
Start: 2020-10-10 | End: 2020-10-10

## 2020-10-10 RX ADMIN — SODIUM CHLORIDE 8 MG/HR: 9 INJECTION, SOLUTION INTRAVENOUS at 23:47

## 2020-10-10 RX ADMIN — SODIUM CHLORIDE 80 MG: 9 INJECTION, SOLUTION INTRAVENOUS at 22:49

## 2020-10-10 RX ADMIN — ONDANSETRON HYDROCHLORIDE 4 MG: 2 INJECTION, SOLUTION INTRAMUSCULAR; INTRAVENOUS at 22:43

## 2020-10-10 ASSESSMENT — PAIN DESCRIPTION - PAIN TYPE: TYPE: ACUTE PAIN

## 2020-10-10 ASSESSMENT — PAIN DESCRIPTION - LOCATION: LOCATION: ABDOMEN

## 2020-10-10 ASSESSMENT — PAIN SCALES - GENERAL: PAINLEVEL_OUTOF10: 6

## 2020-10-11 ENCOUNTER — APPOINTMENT (OUTPATIENT)
Dept: GENERAL RADIOLOGY | Facility: HOSPITAL | Age: 75
DRG: 177 | End: 2020-10-11
Payer: MEDICARE

## 2020-10-11 PROBLEM — I10 BENIGN ESSENTIAL HTN: Status: ACTIVE | Noted: 2020-10-11

## 2020-10-11 PROBLEM — K92.0 HEMATEMESIS: Status: ACTIVE | Noted: 2020-10-11

## 2020-10-11 PROBLEM — U07.1 COVID-19: Status: ACTIVE | Noted: 2020-10-11

## 2020-10-11 PROBLEM — K92.2 GI BLEED: Status: ACTIVE | Noted: 2020-10-11

## 2020-10-11 PROBLEM — K52.9 COLITIS: Status: ACTIVE | Noted: 2020-10-11

## 2020-10-11 PROBLEM — R79.9 ELEVATED BUN: Status: ACTIVE | Noted: 2020-10-11

## 2020-10-11 PROBLEM — J18.9 PNEUMONIA OF LEFT LOWER LOBE DUE TO INFECTIOUS ORGANISM: Status: ACTIVE | Noted: 2020-10-11

## 2020-10-11 LAB
ANION GAP SERPL CALCULATED.3IONS-SCNC: 12 MMOL/L (ref 3–16)
BASE EXCESS ARTERIAL: -1.3 MMOL/L (ref -3–3)
BASOPHILS ABSOLUTE: 0 K/UL (ref 0–0.1)
BASOPHILS RELATIVE PERCENT: 0.4 %
BUN BLDV-MCNC: 41 MG/DL (ref 6–20)
CALCIUM SERPL-MCNC: 8.3 MG/DL (ref 8.5–10.5)
CHLORIDE BLD-SCNC: 103 MMOL/L (ref 98–107)
CO2: 21 MMOL/L (ref 20–30)
CREAT SERPL-MCNC: <0.5 MG/DL (ref 0.4–1.2)
D DIMER: 2.06 UG/ML FEU (ref 0–0.6)
EOSINOPHILS ABSOLUTE: 0 K/UL (ref 0–0.4)
EOSINOPHILS RELATIVE PERCENT: 0 %
FERRITIN: 47 NG/ML (ref 22–322)
FIO2: 0.6 %
GFR AFRICAN AMERICAN: >59
GFR NON-AFRICAN AMERICAN: >60
GLUCOSE BLD-MCNC: 120 MG/DL (ref 74–106)
HCO3 ARTERIAL: 22.6 MMOL/L (ref 22–26)
HCT VFR BLD CALC: 34 % (ref 37–47)
HEMOGLOBIN: 10.5 G/DL (ref 11.5–16.5)
IMMATURE GRANULOCYTES #: 0 K/UL
IMMATURE GRANULOCYTES %: 0.2 % (ref 0–5)
INR BLD: 1.04 (ref 0.87–1.14)
IRON SATURATION: 8 % (ref 15–50)
IRON: 22 UG/DL (ref 37–145)
LYMPHOCYTES ABSOLUTE: 0.8 K/UL (ref 1.5–4)
LYMPHOCYTES RELATIVE PERCENT: 18.5 %
MCH RBC QN AUTO: 26.4 PG (ref 27–32)
MCHC RBC AUTO-ENTMCNC: 30.9 G/DL (ref 31–35)
MCV RBC AUTO: 85.4 FL (ref 80–100)
MONOCYTES ABSOLUTE: 0.4 K/UL (ref 0.2–0.8)
MONOCYTES RELATIVE PERCENT: 9.5 %
NEUTROPHILS ABSOLUTE: 3.2 K/UL (ref 2–7.5)
NEUTROPHILS RELATIVE PERCENT: 71.4 %
O2 SAT, ARTERIAL: 81.9 %
O2 THERAPY: ABNORMAL
OCCULT BLOOD DIAGNOSTIC: ABNORMAL
PCO2 ARTERIAL: 34.6 MMHG (ref 35–45)
PDW BLD-RTO: 14.1 % (ref 11–16)
PH ARTERIAL: 7.43 (ref 7.35–7.45)
PLATELET # BLD: 235 K/UL (ref 150–400)
PMV BLD AUTO: 11 FL (ref 6–10)
PO2 ARTERIAL: 46.7 MMHG (ref 80–100)
POTASSIUM SERPL-SCNC: 3.5 MMOL/L (ref 3.4–5.1)
PROTHROMBIN TIME: 13.5 SEC (ref 11.7–14.6)
RBC # BLD: 3.98 M/UL (ref 3.8–5.8)
SARS-COV-2, NAAT: DETECTED
SARS-COV-2, PCR: ABNORMAL
SODIUM BLD-SCNC: 136 MMOL/L (ref 136–145)
TCO2 ARTERIAL: 23.7 MMOL/L (ref 24–30)
TOTAL IRON BINDING CAPACITY: 268 UG/DL (ref 250–450)
WBC # BLD: 4.5 K/UL (ref 4–11)

## 2020-10-11 PROCEDURE — 80048 BASIC METABOLIC PNL TOTAL CA: CPT

## 2020-10-11 PROCEDURE — 6360000002 HC RX W HCPCS

## 2020-10-11 PROCEDURE — G0378 HOSPITAL OBSERVATION PER HR: HCPCS

## 2020-10-11 PROCEDURE — 1200000000 HC SEMI PRIVATE

## 2020-10-11 PROCEDURE — 6360000002 HC RX W HCPCS: Performed by: PHYSICIAN ASSISTANT

## 2020-10-11 PROCEDURE — 2500000003 HC RX 250 WO HCPCS: Performed by: PHYSICIAN ASSISTANT

## 2020-10-11 PROCEDURE — 85610 PROTHROMBIN TIME: CPT

## 2020-10-11 PROCEDURE — 36600 WITHDRAWAL OF ARTERIAL BLOOD: CPT

## 2020-10-11 PROCEDURE — 71045 X-RAY EXAM CHEST 1 VIEW: CPT

## 2020-10-11 PROCEDURE — 99222 1ST HOSP IP/OBS MODERATE 55: CPT | Performed by: PHYSICIAN ASSISTANT

## 2020-10-11 PROCEDURE — 6370000000 HC RX 637 (ALT 250 FOR IP): Performed by: INTERNAL MEDICINE

## 2020-10-11 PROCEDURE — U0003 INFECTIOUS AGENT DETECTION BY NUCLEIC ACID (DNA OR RNA); SEVERE ACUTE RESPIRATORY SYNDROME CORONAVIRUS 2 (SARS-COV-2) (CORONAVIRUS DISEASE [COVID-19]), AMPLIFIED PROBE TECHNIQUE, MAKING USE OF HIGH THROUGHPUT TECHNOLOGIES AS DESCRIBED BY CMS-2020-01-R: HCPCS

## 2020-10-11 PROCEDURE — 2580000003 HC RX 258: Performed by: INTERNAL MEDICINE

## 2020-10-11 PROCEDURE — 6360000002 HC RX W HCPCS: Performed by: INTERNAL MEDICINE

## 2020-10-11 PROCEDURE — 85025 COMPLETE CBC W/AUTO DIFF WBC: CPT

## 2020-10-11 PROCEDURE — 85379 FIBRIN DEGRADATION QUANT: CPT

## 2020-10-11 PROCEDURE — 83550 IRON BINDING TEST: CPT

## 2020-10-11 PROCEDURE — 6370000000 HC RX 637 (ALT 250 FOR IP): Performed by: PHYSICIAN ASSISTANT

## 2020-10-11 PROCEDURE — 36415 COLL VENOUS BLD VENIPUNCTURE: CPT

## 2020-10-11 PROCEDURE — XW033E5 INTRODUCTION OF REMDESIVIR ANTI-INFECTIVE INTO PERIPHERAL VEIN, PERCUTANEOUS APPROACH, NEW TECHNOLOGY GROUP 5: ICD-10-PCS | Performed by: INTERNAL MEDICINE

## 2020-10-11 PROCEDURE — 82728 ASSAY OF FERRITIN: CPT

## 2020-10-11 PROCEDURE — 87040 BLOOD CULTURE FOR BACTERIA: CPT

## 2020-10-11 PROCEDURE — 6370000000 HC RX 637 (ALT 250 FOR IP)

## 2020-10-11 PROCEDURE — U0002 COVID-19 LAB TEST NON-CDC: HCPCS

## 2020-10-11 PROCEDURE — 2580000003 HC RX 258

## 2020-10-11 PROCEDURE — 82803 BLOOD GASES ANY COMBINATION: CPT

## 2020-10-11 PROCEDURE — 2580000003 HC RX 258: Performed by: PHYSICIAN ASSISTANT

## 2020-10-11 PROCEDURE — 83540 ASSAY OF IRON: CPT

## 2020-10-11 RX ORDER — METRONIDAZOLE 500 MG/1
500 TABLET ORAL EVERY 8 HOURS
Status: DISCONTINUED | OUTPATIENT
Start: 2020-10-11 | End: 2020-10-16 | Stop reason: HOSPADM

## 2020-10-11 RX ORDER — NIFEDIPINE 30 MG/1
30 TABLET, EXTENDED RELEASE ORAL ONCE
Status: COMPLETED | OUTPATIENT
Start: 2020-10-11 | End: 2020-10-11

## 2020-10-11 RX ORDER — SODIUM CHLORIDE 0.9 % (FLUSH) 0.9 %
10 SYRINGE (ML) INJECTION PRN
Status: DISCONTINUED | OUTPATIENT
Start: 2020-10-11 | End: 2020-10-16 | Stop reason: HOSPADM

## 2020-10-11 RX ORDER — ASCORBIC ACID 500 MG
500 TABLET ORAL DAILY
Status: DISCONTINUED | OUTPATIENT
Start: 2020-10-11 | End: 2020-10-16 | Stop reason: HOSPADM

## 2020-10-11 RX ORDER — ONDANSETRON 2 MG/ML
4 INJECTION INTRAMUSCULAR; INTRAVENOUS EVERY 6 HOURS PRN
Status: DISCONTINUED | OUTPATIENT
Start: 2020-10-11 | End: 2020-10-16 | Stop reason: HOSPADM

## 2020-10-11 RX ORDER — ZINC SULFATE 50(220)MG
50 CAPSULE ORAL DAILY
Status: DISCONTINUED | OUTPATIENT
Start: 2020-10-11 | End: 2020-10-11 | Stop reason: SDUPTHER

## 2020-10-11 RX ORDER — METRONIDAZOLE 500 MG/1
TABLET ORAL
Status: COMPLETED
Start: 2020-10-11 | End: 2020-10-11

## 2020-10-11 RX ORDER — CEFTRIAXONE 1 G/1
INJECTION, POWDER, FOR SOLUTION INTRAMUSCULAR; INTRAVENOUS
Status: COMPLETED
Start: 2020-10-11 | End: 2020-10-11

## 2020-10-11 RX ORDER — ZINC SULFATE 50(220)MG
50 CAPSULE ORAL DAILY
Status: DISCONTINUED | OUTPATIENT
Start: 2020-10-11 | End: 2020-10-16 | Stop reason: HOSPADM

## 2020-10-11 RX ORDER — ATORVASTATIN CALCIUM 10 MG/1
10 TABLET, FILM COATED ORAL DAILY
Status: DISCONTINUED | OUTPATIENT
Start: 2020-10-11 | End: 2020-10-16 | Stop reason: HOSPADM

## 2020-10-11 RX ORDER — POLYETHYLENE GLYCOL 3350 17 G/17G
17 POWDER, FOR SOLUTION ORAL DAILY
Status: DISCONTINUED | OUTPATIENT
Start: 2020-10-11 | End: 2020-10-16 | Stop reason: HOSPADM

## 2020-10-11 RX ORDER — ONDANSETRON 4 MG/1
4 TABLET, ORALLY DISINTEGRATING ORAL EVERY 8 HOURS PRN
Status: DISCONTINUED | OUTPATIENT
Start: 2020-10-11 | End: 2020-10-16 | Stop reason: HOSPADM

## 2020-10-11 RX ORDER — NIFEDIPINE 30 MG/1
30 TABLET, EXTENDED RELEASE ORAL DAILY
Status: DISCONTINUED | OUTPATIENT
Start: 2020-10-11 | End: 2020-10-11

## 2020-10-11 RX ORDER — CALCIUM POLYCARBOPHIL 625 MG 625 MG/1
625 TABLET ORAL 2 TIMES DAILY
Status: DISCONTINUED | OUTPATIENT
Start: 2020-10-11 | End: 2020-10-16 | Stop reason: HOSPADM

## 2020-10-11 RX ORDER — M-VIT,TX,IRON,MINS/CALC/FOLIC 27MG-0.4MG
1 TABLET ORAL DAILY
Status: DISCONTINUED | OUTPATIENT
Start: 2020-10-11 | End: 2020-10-16 | Stop reason: HOSPADM

## 2020-10-11 RX ORDER — SODIUM CHLORIDE 0.9 % (FLUSH) 0.9 %
10 SYRINGE (ML) INJECTION EVERY 12 HOURS SCHEDULED
Status: DISCONTINUED | OUTPATIENT
Start: 2020-10-11 | End: 2020-10-16 | Stop reason: HOSPADM

## 2020-10-11 RX ORDER — PANTOPRAZOLE SODIUM 40 MG/10ML
INJECTION, POWDER, LYOPHILIZED, FOR SOLUTION INTRAVENOUS
Status: DISCONTINUED
Start: 2020-10-11 | End: 2020-10-12 | Stop reason: ALTCHOICE

## 2020-10-11 RX ORDER — BISACODYL 10 MG
10 SUPPOSITORY, RECTAL RECTAL DAILY
Status: DISCONTINUED | OUTPATIENT
Start: 2020-10-11 | End: 2020-10-16 | Stop reason: HOSPADM

## 2020-10-11 RX ORDER — DONEPEZIL HYDROCHLORIDE 5 MG/1
10 TABLET, FILM COATED ORAL NIGHTLY
Status: DISCONTINUED | OUTPATIENT
Start: 2020-10-11 | End: 2020-10-16 | Stop reason: HOSPADM

## 2020-10-11 RX ORDER — VENLAFAXINE 75 MG/1
150 TABLET ORAL DAILY
Status: DISCONTINUED | OUTPATIENT
Start: 2020-10-11 | End: 2020-10-16 | Stop reason: HOSPADM

## 2020-10-11 RX ORDER — ASCORBIC ACID 500 MG
500 TABLET ORAL DAILY
Status: DISCONTINUED | OUTPATIENT
Start: 2020-10-11 | End: 2020-10-11 | Stop reason: SDUPTHER

## 2020-10-11 RX ORDER — MORPHINE SULFATE 15 MG/1
15 TABLET, FILM COATED, EXTENDED RELEASE ORAL 2 TIMES DAILY
Status: DISCONTINUED | OUTPATIENT
Start: 2020-10-11 | End: 2020-10-16 | Stop reason: HOSPADM

## 2020-10-11 RX ORDER — GLYCERIN
LIQUID (ML) TOPICAL DAILY PRN
Status: ON HOLD | COMMUNITY
End: 2020-10-12

## 2020-10-11 RX ORDER — POLYETHYLENE GLYCOL 3350 17 G/17G
17 POWDER, FOR SOLUTION ORAL DAILY PRN
Status: DISCONTINUED | OUTPATIENT
Start: 2020-10-11 | End: 2020-10-16 | Stop reason: HOSPADM

## 2020-10-11 RX ORDER — SUCRALFATE 1 G/1
1 TABLET ORAL 4 TIMES DAILY
Status: DISCONTINUED | OUTPATIENT
Start: 2020-10-11 | End: 2020-10-16 | Stop reason: HOSPADM

## 2020-10-11 RX ORDER — MINERAL OIL 100 G/100G
1 OIL RECTAL ONCE
Status: COMPLETED | OUTPATIENT
Start: 2020-10-11 | End: 2020-10-11

## 2020-10-11 RX ORDER — NIFEDIPINE 60 MG/1
60 TABLET, EXTENDED RELEASE ORAL DAILY
Status: DISCONTINUED | OUTPATIENT
Start: 2020-10-12 | End: 2020-10-16 | Stop reason: HOSPADM

## 2020-10-11 RX ORDER — SENNA PLUS 8.6 MG/1
2 TABLET ORAL 2 TIMES DAILY
Status: DISCONTINUED | OUTPATIENT
Start: 2020-10-11 | End: 2020-10-16 | Stop reason: HOSPADM

## 2020-10-11 RX ORDER — TOPIRAMATE 25 MG/1
25 TABLET ORAL 2 TIMES DAILY
Status: DISCONTINUED | OUTPATIENT
Start: 2020-10-11 | End: 2020-10-16 | Stop reason: HOSPADM

## 2020-10-11 RX ORDER — FERROUS SULFATE TAB EC 324 MG (65 MG FE EQUIVALENT) 324 (65 FE) MG
324 TABLET DELAYED RESPONSE ORAL
Status: DISCONTINUED | OUTPATIENT
Start: 2020-10-12 | End: 2020-10-16 | Stop reason: HOSPADM

## 2020-10-11 RX ORDER — ACETAMINOPHEN 325 MG/1
650 TABLET ORAL EVERY 4 HOURS PRN
Status: DISCONTINUED | OUTPATIENT
Start: 2020-10-11 | End: 2020-10-16 | Stop reason: HOSPADM

## 2020-10-11 RX ORDER — SODIUM CHLORIDE 9 MG/ML
INJECTION, SOLUTION INTRAVENOUS CONTINUOUS
Status: ACTIVE | OUTPATIENT
Start: 2020-10-11 | End: 2020-10-12

## 2020-10-11 RX ORDER — DOCUSATE SODIUM 100 MG/1
100 CAPSULE, LIQUID FILLED ORAL DAILY
Status: DISCONTINUED | OUTPATIENT
Start: 2020-10-11 | End: 2020-10-16 | Stop reason: HOSPADM

## 2020-10-11 RX ADMIN — POLYETHYLENE GLYCOL 3350 17 G: 17 POWDER, FOR SOLUTION ORAL at 10:25

## 2020-10-11 RX ADMIN — MORPHINE SULFATE 15 MG: 15 TABLET, FILM COATED, EXTENDED RELEASE ORAL at 10:11

## 2020-10-11 RX ADMIN — BISACODYL 10 MG: 10 SUPPOSITORY RECTAL at 10:11

## 2020-10-11 RX ADMIN — NIFEDIPINE 30 MG: 30 TABLET, FILM COATED, EXTENDED RELEASE ORAL at 14:13

## 2020-10-11 RX ADMIN — DEXTROSE MONOHYDRATE: 5 INJECTION INTRAVENOUS at 01:28

## 2020-10-11 RX ADMIN — Medication 10 ML: at 10:28

## 2020-10-11 RX ADMIN — TOPIRAMATE 25 MG: 25 TABLET, FILM COATED ORAL at 20:24

## 2020-10-11 RX ADMIN — SENNOSIDES 17.2 MG: 8.6 TABLET, FILM COATED ORAL at 10:10

## 2020-10-11 RX ADMIN — REMDESIVIR 200 MG: 100 INJECTION, POWDER, LYOPHILIZED, FOR SOLUTION INTRAVENOUS at 18:24

## 2020-10-11 RX ADMIN — MAGNESIUM OXIDE TAB 400 MG (241.3 MG ELEMENTAL MG) 400 MG: 400 (241.3 MG) TAB at 10:11

## 2020-10-11 RX ADMIN — MULTIPLE VITAMINS W/ MINERALS TAB 1 TABLET: TAB at 10:11

## 2020-10-11 RX ADMIN — NIFEDIPINE 30 MG: 30 TABLET, FILM COATED, EXTENDED RELEASE ORAL at 10:11

## 2020-10-11 RX ADMIN — CALCIUM POLYCARBOPHIL 625 MG TABLET 625 MG: at 20:24

## 2020-10-11 RX ADMIN — IRON SUCROSE 200 MG: 20 INJECTION, SOLUTION INTRAVENOUS at 14:47

## 2020-10-11 RX ADMIN — METRONIDAZOLE 500 MG: 500 TABLET ORAL at 10:31

## 2020-10-11 RX ADMIN — SENNOSIDES 17.2 MG: 8.6 TABLET, FILM COATED ORAL at 20:25

## 2020-10-11 RX ADMIN — METRONIDAZOLE 500 MG: 500 TABLET ORAL at 17:03

## 2020-10-11 RX ADMIN — DEXAMETHASONE 6 MG: 4 TABLET ORAL at 14:01

## 2020-10-11 RX ADMIN — MINERAL OIL 1 ENEMA: 100 ENEMA RECTAL at 14:30

## 2020-10-11 RX ADMIN — AZITHROMYCIN DIHYDRATE 500 MG: 500 INJECTION, POWDER, LYOPHILIZED, FOR SOLUTION INTRAVENOUS at 12:39

## 2020-10-11 RX ADMIN — MORPHINE SULFATE 15 MG: 15 TABLET, FILM COATED, EXTENDED RELEASE ORAL at 20:24

## 2020-10-11 RX ADMIN — METRONIDAZOLE: 500 TABLET, FILM COATED ORAL at 01:28

## 2020-10-11 RX ADMIN — VENLAFAXINE 150 MG: 75 TABLET ORAL at 10:11

## 2020-10-11 RX ADMIN — ZINC SULFATE 220 MG (50 MG) CAPSULE 50 MG: CAPSULE at 10:10

## 2020-10-11 RX ADMIN — DOCUSATE SODIUM 100 MG: 100 CAPSULE, LIQUID FILLED ORAL at 10:10

## 2020-10-11 RX ADMIN — SUCRALFATE 1 G: 1 TABLET ORAL at 16:56

## 2020-10-11 RX ADMIN — ATORVASTATIN CALCIUM 10 MG: 10 TABLET, FILM COATED ORAL at 20:25

## 2020-10-11 RX ADMIN — Medication 10 ML: at 20:25

## 2020-10-11 RX ADMIN — SUCRALFATE 1 G: 1 TABLET ORAL at 20:25

## 2020-10-11 RX ADMIN — ACETAMINOPHEN 650 MG: 325 TABLET, FILM COATED ORAL at 16:56

## 2020-10-11 RX ADMIN — DONEPEZIL HYDROCHLORIDE 10 MG: 5 TABLET, FILM COATED ORAL at 20:24

## 2020-10-11 RX ADMIN — MAGNESIUM HYDROXIDE 5 ML: 2400 SUSPENSION ORAL at 10:12

## 2020-10-11 RX ADMIN — OXYCODONE HYDROCHLORIDE AND ACETAMINOPHEN 500 MG: 500 TABLET ORAL at 10:10

## 2020-10-11 RX ADMIN — CALCIUM POLYCARBOPHIL 625 MG TABLET 625 MG: at 10:11

## 2020-10-11 RX ADMIN — CEFTRIAXONE SODIUM: 1 INJECTION, POWDER, FOR SOLUTION INTRAMUSCULAR; INTRAVENOUS at 01:28

## 2020-10-11 RX ADMIN — TOPIRAMATE 25 MG: 25 TABLET, FILM COATED ORAL at 10:11

## 2020-10-11 RX ADMIN — SODIUM CHLORIDE: 9 INJECTION, SOLUTION INTRAVENOUS at 02:53

## 2020-10-11 RX ADMIN — SUCRALFATE 1 G: 1 TABLET ORAL at 12:27

## 2020-10-11 ASSESSMENT — PAIN SCALES - GENERAL
PAINLEVEL_OUTOF10: 10
PAINLEVEL_OUTOF10: 4

## 2020-10-11 NOTE — ED TRIAGE NOTES
Per Nursing home staff patient had episode of coffee ground emesis. Pt states that her stomach is hurting as well.

## 2020-10-11 NOTE — PROGRESS NOTES
Attempted to call patient's  Yara Narvaez twice at [phone] and no answer. Will re-attempt shortly.     Daniella Csatillo PA-C

## 2020-10-11 NOTE — ED PROVIDER NOTES
[address] ENCOUNTER      Pt Name: Lior Brumfield  MRN: [id number]  YOB: 1945  Date of evaluation: 10/10/2020  Provider: Ronda Victor MD    CHIEF COMPLAINT       Chief Complaint   Patient presents with    Hematemesis         HISTORY OF PRESENT ILLNESS  (Location/Symptom, Timing/Onset, Context/Setting, Quality, Duration, Modifying Factors, Severity.)   Lior Montoyar is a 66 y.o. female who presents to the emergency department with concern is of 2 episodes of hematemesis prior to arrival.  Patient is a nursing home patient who is on iron therapy 3 times a week secondary to anemia. Nursing home noted that the first time that she vomited they thought it was just regular vomit but the second time that she vomited there was concerned that it was blood-tinged. Patient was transferred to the emergency department for further evaluation. Patient has known dementia but will answer some questions appropriately. While in the emergency department patient did have a small episode of hematemesis. Patient denied any chest pain shortness of breath but states that she does have some diffuse abdominal pain. Nursing home notes that her roommate at the nursing home tested positive today for COVID-19. Patient is resting comfortably in the room in no acute distress. Review of patient's medications reveals that she is not on any anticoagulations but that she does take oral iron pills 3 times a week      Nursing notes were reviewed.     REVIEW OFSYSTEMS    (2-9 systems for level 4, 10 or more for level 5)   ROS: Per nursing home, EMS and patient  General:  No fevers, no chills, no weakness  Cardiovascular:  No chest pain, no palpitations  Respiratory:  No shortness of breath, no cough, no wheezing  Gastrointestinal: Nausea and vomiting and abdominal pain  Musculoskeletal:  No muscle pain, no joint pain  Skin:  No rash, no easy bruising  Neurologic:  No speech problems, no headache, no extremity weakness  Psychiatric:  No anxiety  Genitourinary:  No dysuria, no hematuria    Except as noted above the remainder of the review of systems was reviewed and negative.        PAST MEDICAL HISTORY     Past Medical History:   Diagnosis Date    Anemia     Anxiety     Blind     Narayan Bonnet syndrome     Chronic back pain     Constipation     Dementia [facility]     Depression     Diabetes mellitus ([facility]     Dysphagia     GERD (gastroesophageal reflux disease)     GI bleed     History of opioid abuse ([facility]     Hyperlipidemia     Hypertension     Insomnia     Interstitial cystitis     Retinitis pigmentosa     UTI (urinary tract infection)     Vitamin D deficiency     Vitamin deficiency          SURGICAL HISTORY       Past Surgical History:   Procedure Laterality Date    APPENDECTOMY      BACK SURGERY      x2    BLADDER SURGERY      multiple bladder surgeries    BREAST SURGERY      lump removed    CARPAL TUNNEL RELEASE Bilateral     CHOLECYSTECTOMY      COLONOSCOPY N/A 7/8/2019    COLONOSCOPY POLYPECTOMY SNARE/COLD BIOPSY performed by Allana Bumpers, MD at [address] [place] [address] GASTROINTESTINAL ENDOSCOPY N/A 7/8/2019    EGD BIOPSY performed by Allana Bumpers, MD at [address] [place] [address]       Previous Medications    ACETAMINOPHEN (TYLENOL) 650 MG SUPPOSITORY    Place 650 mg rectally every 6 hours as needed for Fever or Pain     ASCORBIC ACID (VITAMIN C) 500 MG TABLET    Take 500 mg by mouth daily    BISACODYL (DULCOLAX) 10 MG SUPPOSITORY    Place 10 mg rectally daily as needed for Constipation Use if no bowel movement in 4 days    CYCLOBENZAPRINE (FLEXERIL) 5 MG TABLET    Take 5 mg by mouth daily    CYPROHEPTADINE (PERIACTIN) 4 MG TABLET    Take 4 mg by mouth 3 times daily    DONEPEZIL (ARICEPT) 10 MG TABLET    Take 10 mg by mouth nightly    FERROUS SULFATE (IRON 325) 325 (65 FE) MG TABLET Take 1 tablet by mouth 3 times daily (with meals)    GLYCERIN TOPICAL LIQD    Apply topically daily as needed    MAGNESIUM HYDROXIDE (MILK OF MAGNESIA CONCENTRATE) 2400 MG/10ML SUSP    Take 400 mg by mouth once as needed    MORPHINE (MS CONTIN) 15 MG EXTENDED RELEASE TABLET    Take 15 mg by mouth 2 times daily. .    MULTIPLE VITAMINS-MINERALS (THERA M PLUS) TABS    Take 1 each by mouth daily    NIFEDIPINE (ADALAT CC) 30 MG EXTENDED RELEASE TABLET    Take 30 mg by mouth daily    ONDANSETRON (ZOFRAN) 4 MG TABLET    Take 4 mg by mouth every 6 hours as needed for Nausea or Vomiting    PANTOPRAZOLE SODIUM (PROTONIX) 40 MG PACK PACKET    Take 40 mg by mouth 2 times daily (before meals)    POLYCARBOPHIL (FIBERCON) 625 MG TABLET    Take 625 mg by mouth 2 times daily     SENNA (SENOKOT) 8.6 MG TABLET    Take 2 tablets by mouth nightly as needed for Constipation     SIMVASTATIN (ZOCOR) 40 MG TABLET    Take 40 mg by mouth nightly     SUCRALFATE (CARAFATE) 1 GM TABLET    Take 1 tablet by mouth 4 times daily    TOPIRAMATE (TOPAMAX) 25 MG TABLET    Take 25 mg by mouth 2 times daily    VENLAFAXINE (EFFEXOR) 100 MG TABLET    Take 200 mg by mouth daily       ALLERGIES     Aspirin; Ibuprofen; and Thorazine [chlorpromazine]    FAMILY HISTORY     History reviewed. No pertinent family history.        SOCIAL HISTORY       Social History     Socioeconomic History    Marital status:      Spouse name: None    Number of children: None    Years of education: None    Highest education level: None   Occupational History    None   Social Needs    Financial resource strain: None    Food insecurity     Worry: None     Inability: None    Transportation needs     Medical: None     Non-medical: None   Tobacco Use    Smoking status: Never Smoker    Smokeless tobacco: Never Used   Substance and Sexual Activity    Alcohol use: No    Drug use: No    Sexual activity: None   Lifestyle    Physical activity     Days per week: None incontinence or loss of bowel or bladder function, no saddle anesthesia noted   Dermatology: Skin is warm and dry  Psych: Is awake and well converse when asked      DIAGNOSTIC RESULTS     EKG: All EKG's are interpreted by the Emergency Department Physician who either signs or Co-signs this chart in the [address] a cardiologist.    The EKG interpreted by me shows sinus tachycardia rate of 117 per EDMD    RADIOLOGY:   Non-plain film images such as CT, Ultrasound and MRI are read by the radiologist. Plain radiographic images are visualized and preliminarily interpreted by the emergency physician with the below findings:      ? Radiologist's Report Reviewed:  CT ABDOMEN PELVIS WO CONTRAST Additional Contrast? None   Final Result   1. Large amount of stool in the rectum. Fecal impaction should be    considered. There is question of some mild infiltration of the adjacent    fat. Stercoral colitis cannot be excluded. 2.  There are multiple prominent small bowel loops. Although a well-   defined transition point is not clearly identified, an obstructive    process cannot be excluded. 3.  Bilateral nephrolithiasis without definite CT evidence for    hydronephrosis. The ureters are not well-visualized. No definite    evidence for ureteral stone.                 ED BEDSIDE ULTRASOUND:   Performed by ED Physician - none    LABS:    I have reviewed and interpreted all of the currently available lab results from this visit (ifapplicable):  Results for orders placed or performed during the hospital encounter of 10/10/20   CBC Auto Differential   Result Value Ref Range    WBC 9.0 4.0 - 11.0 K/uL    RBC 5.13 3.80 - 5.80 M/uL    Hemoglobin 13.5 11.5 - 16.5 g/dL    Hematocrit 44.2 37.0 - 47.0 %    MCV 86.2 80.0 - 100.0 fL    MCH 26.3 (L) 27.0 - 32.0 pg    MCHC 30.5 (L) 31.0 - 35.0 g/dL    RDW 14.2 11.0 - 16.0 %    Platelets 376 914 - 252 K/uL    MPV 12.5 (H) 6.0 - 10.0 fL    Neutrophils % 85.8 %    Immature Granulocytes % 0.2 0.0 - 5.0 %    Lymphocytes % 6.5 %    Monocytes % 7.4 %    Eosinophils % 0.0 %    Basophils % 0.1 %    Neutrophils Absolute 7.8 (H) 2.0 - 7.5 K/uL    Immature Granulocytes # 0.0 K/uL    Lymphocytes Absolute 0.6 (L) 1.5 - 4.0 K/uL    Monocytes Absolute 0.7 0.2 - 0.8 K/uL    Eosinophils Absolute 0.0 0.0 - 0.4 K/uL    Basophils Absolute 0.0 0.0 - 0.1 K/uL   Comprehensive Metabolic Panel w/ Reflex to MG   Result Value Ref Range    Sodium 135 (L) 136 - 145 mmol/L    Potassium reflex Magnesium 3.5 3.4 - 5.1 mmol/L    Chloride 98 98 - 107 mmol/L    CO2 23 20 - 30 mmol/L    Anion Gap 14 3 - 16    Glucose 262 (H) 74 - 106 mg/dL    BUN 56 (H) 6 - 20 mg/dL    CREATININE 0.6 0.4 - 1.2 mg/dL    GFR Non-African American >60 >59    GFR African American >59 >59    Calcium 10.3 8.5 - 10.5 mg/dL    Total Protein 8.3 6.4 - 8.3 g/dL    Alb 4.1 3.4 - 4.8 g/dL    Albumin/Globulin Ratio 1.0 0.8 - 2.0    Total Bilirubin 0.3 0.3 - 1.2 mg/dL    Alkaline Phosphatase 208 (H) 25 - 100 U/L    ALT 62 (H) 4 - 36 U/L    AST 30 8 - 33 U/L    Globulin 4.2 g/dL   Lipase   Result Value Ref Range    Lipase 18.0 5.6 - 51.3 U/L   Urine Reflex to Culture    Specimen: Urine, clean catch   Result Value Ref Range    Color, UA Yellow Straw/Yellow    Clarity, UA SLCLOUDY Clear    Glucose, Ur Negative Negative mg/dL    Bilirubin Urine Negative Negative    Ketones, Urine TRACE (A) Negative mg/dL    Specific Gravity, UA 1.025 1.005 - 1.030    Blood, Urine SMALL (A) Negative    pH, UA 5.5 5.0 - 8.0    Protein, UA >=300 (A) Negative mg/dL    Urobilinogen, Urine 0.2 <2.0 E.U./dL    Nitrite, Urine Negative Negative    Leukocyte Esterase, Urine Negative Negative    Microscopic Examination YES     Urine Type Catheter     Urine Reflex to Culture Not Indicated    Troponin   Result Value Ref Range    Troponin <0.30 <0.30 ng/mL   Brain Natriuretic Peptide   Result Value Ref Range    Pro- 0 - 1,800 pg/mL   Blood Occult Stool Diagnostic   Result Value Ref Range normal limits. Negative troponin along with BNP. CBC reveals a hemoglobin of 13.5 thousand. Patient's previous hemoglobin on 3/29/2020 was 7.6 consistent with patient's chronic anemia. Urinalysis reveals mild leukorrhea with 6-9 WBCs. Negative nitrates and leukocyte esterase. CT scan abdomen and pelvis revealed a large amount of stool in the rectum along with mild infiltration of the adjacent fat consistent with concern of rectal colitis. Results were reviewed with patient and the concerns of the nausea vomiting and reported hematemesis. Also she was instructed about concerns of rectal colitis and concerns of possible fecal impaction the need for soap and water enemas and serial H&H's and IV antibiotics. Dr. Anderson Sheffield was consulted by phone who graciously accepted the patient for admission for further evaluation definitive care             CONSULTS: Dr. Anderson Sheffield was consulted by phone who graciously accepted the patient for admission for further evaluation and definitive care      PROCEDURES:  Procedures    CRITICAL CARE TIME    Total Critical Care time was 0 minutes, excluding separately reportable procedures. There was a high probability of clinically significant/life threatening deterioration in the patient's condition which required my urgent intervention. FINAL IMPRESSION      1. Hematemesis with nausea Stable   2. Gastrointestinal hemorrhage with melena Stable   3. Other constipation Stable   4. Colitis Stable   5. History of GI bleed Stable   6. History of dementia Stable         DISPOSITION/PLAN   DISPOSITION Admitted 10/11/2020 01:26:40 AM      PATIENT REFERRED TO:  No follow-up provider specified.     DISCHARGE MEDICATIONS:  New Prescriptions    No medications on file       Comment: Please note this report has been produced using speech recognition software and may contain errorsrelated to that system including errors in grammar, punctuation, and spelling, as well as words and phrases that may be

## 2020-10-11 NOTE — PROGRESS NOTES
Pt to Room 10 per stretcher and staff. Pt blind, but alert and answers questions appropriately. States her name and date of birth, but not sure where she is. Easily reoriented. Pt in obs for hematemesis and constipation.

## 2020-10-11 NOTE — H&P
History and Physical    Patient:  Dev Blevins    CHIEF COMPLAINT:    hematemesis     HISTORY OF PRESENT ILLNESS:   The patient is a 66 y.o. female with PMH of chronic iron deficiency anemia and history of GI bleed, GERD who Is a long term resident at [address] [place] [address] who presented overnight due to nausea and hematemesis at the nursing facility. Patient is a fair historian in setting of mild dementia and overall clinical condition. She is blind and severely contracted/bedbound at baseline. History obtained from patient, chart review, and nursing home. Patient states she is here for vomiting. Reports some mild abdominal pain yesterday but this is better today. Denies fever, sweats, chills. The nurses told her there was blood in her vomit but she was unable to see due to blindness. She denies cough or shortness of breath. She has had recurrent admissions at this facility for anemia requiring iron infusion and blood transfusion. Per  Arnel Duncan and chart review she has had multiple egds and colonoscopies at [facility] and other facilities in the past and the source of bleeding went unidentified so they have refused any further scopes. Patient and  continue to refuse EGD or colonoscopy despite heme positive stool and obvious hematemesis. Of note, she was tested for COVID prior to admission and she was positive for COVID 19. This morning on room air but then O2 sat noted to be around 88% and she was placed on 3L supplemental O2. CXR with left basilar airspace disease. No further hematemesis since arrival to the floor. At time of exam today she remains oriented to self, place, situation and is answering most questions appropriately which is her baseline mental status. Says she does not want any surgeries or procedures but she wants to remain a full code. Per [address] [place] [address] she is a palliative care patient but full code.      Past Medical History:      Diagnosis Date    Anemia     Anxiety     Blind     Narayan Bonnet syndrome     Chronic back pain     Constipation     Dementia ([facility]     Depression     Diabetes mellitus ([facility]     Dysphagia     GERD (gastroesophageal reflux disease)     GI bleed     History of opioid abuse ([facility]     Hyperlipidemia     Hypertension     Insomnia     Interstitial cystitis     Retinitis pigmentosa     UTI (urinary tract infection)     Vitamin D deficiency     Vitamin deficiency        Past Surgical History:      Procedure Laterality Date    APPENDECTOMY      BACK SURGERY      x2    BLADDER SURGERY      multiple bladder surgeries    BREAST SURGERY      lump removed    CARPAL TUNNEL RELEASE Bilateral     CHOLECYSTECTOMY      COLONOSCOPY N/A 7/8/2019    COLONOSCOPY POLYPECTOMY SNARE/COLD BIOPSY performed by Mnoet Wallis MD at [address] [place] [address] Drive GASTROINTESTINAL ENDOSCOPY N/A 7/8/2019    EGD BIOPSY performed by Monet Wallis MD at [facility] CHILDREN ENDOSCOPY       Medications Prior to Admission:    Prior to Admission medications    Medication Sig Start Date End Date Taking?  Authorizing Provider   glycerin topical LIQD Apply topically daily as needed   Yes Historical Provider, MD   ferrous sulfate (IRON 325) 325 (65 Fe) MG tablet Take 1 tablet by mouth 3 times daily (with meals) 3/29/20   FLASH Gongora   magnesium hydroxide (MILK OF MAGNESIA CONCENTRATE) 2400 MG/10ML SUSP Take 400 mg by mouth once as needed    Historical Provider, MD   ondansetron (ZOFRAN) 4 MG tablet Take 4 mg by mouth every 6 hours as needed for Nausea or Vomiting    Historical Provider, MD   cyclobenzaprine (FLEXERIL) 5 MG tablet Take 5 mg by mouth daily    Historical Provider, MD   topiramate (TOPAMAX) 25 MG tablet Take 25 mg by mouth 2 times daily    Historical Provider, MD   pantoprazole sodium (PROTONIX) 40 MG PACK packet Take 40 mg by mouth 2 times daily (before meals)    Historical Provider, MD   ascorbic acid (VITAMIN C) 500 MG tablet Take 500 mg by mouth daily    Historical Provider, MD   NIFEdipine (ADALAT CC) 30 MG extended release tablet Take 30 mg by mouth daily    Historical Provider, MD   senna (SENOKOT) 8.6 MG tablet Take 2 tablets by mouth nightly as needed for Constipation     Historical Provider, MD   polycarbophil (FIBERCON) 625 MG tablet Take 625 mg by mouth 2 times daily     Historical Provider, MD   venlafaxine (EFFEXOR) 100 MG tablet Take 200 mg by mouth daily    Historical Provider, MD   sucralfate (CARAFATE) 1 GM tablet Take 1 tablet by mouth 4 times daily 7/4/19   FLASH Childers   cyproheptadine (PERIACTIN) 4 MG tablet Take 4 mg by mouth 3 times daily    Historical Provider, MD   bisacodyl (DULCOLAX) 10 MG suppository Place 10 mg rectally daily as needed for Constipation Use if no bowel movement in 4 days    Historical Provider, MD   acetaminophen (TYLENOL) 650 MG suppository Place 650 mg rectally every 6 hours as needed for Fever or Pain     Historical Provider, MD   Multiple Vitamins-Minerals (THERA M PLUS) TABS Take 1 each by mouth daily    Historical Provider, MD   morphine (MS CONTIN) 15 MG extended release tablet Take 15 mg by mouth 2 times daily. Cody Bermudez Historical Provider, MD   donepezil (ARICEPT) 10 MG tablet Take 10 mg by mouth nightly    Historical Provider, MD   simvastatin (ZOCOR) 40 MG tablet Take 40 mg by mouth nightly     Historical Provider, MD       Allergies:  Aspirin; Ibuprofen; and Thorazine [chlorpromazine]    Social History:   TOBACCO:   reports that she has never smoked. She has never used smokeless tobacco.  ETOH:   reports no history of alcohol use. OCCUPATION:  None      Family History:   History reviewed. No pertinent family history. Review of system  Constitutional:  Denies fever or chills. Positive for declining functional status at baseline. Eyes:  Denies eye pain or redness. Positive for blindness.   HENT:  Denies nasal congestion or sore throat   Respiratory:  Denies cough or AST 30 10/10/2020    ALT 62 (H) 10/10/2020    LABGLOM >60 10/11/2020    GFRAA >59 10/11/2020    AGRATIO 1.0 10/10/2020    GLOB 4.2 10/10/2020           Lab Results   Component Value Date    WBC 4.5 10/11/2020    HGB 10.5 (L) 10/11/2020    HCT 34.0 (L) 10/11/2020    MCV 85.4 10/11/2020     10/11/2020       XR CHEST PORTABLE   Final Result      Left basilar minimal airspace disease. CT ABDOMEN PELVIS WO CONTRAST Additional Contrast? None   Final Result   1. Large amount of stool in the rectum. Fecal impaction should be    considered. There is question of some mild infiltration of the adjacent    fat. Stercoral colitis cannot be excluded. 2.  There are multiple prominent small bowel loops. Although a well-   defined transition point is not clearly identified, an obstructive    process cannot be excluded. 3.  Bilateral nephrolithiasis without definite CT evidence for    hydronephrosis. The ureters are not well-visualized. No definite    evidence for ureteral stone. Assessment and Plan     Active Hospital Problems    Diagnosis Date Noted    GI bleed [K92.2]  - presents due to hematemesis and hemoccult +   - Longstanding history of iron deficiency anemia and requiring recurrent transfusions per chart review - patient and her  have declined repeat upper/lower endoscopy for approximately last 1 year  - Hgb 13.5 on arrival --> 10.5 10/11   - on PPI drip and added carafate 10/11   - No anticoagulation in setting of COVID 19  And elevated d dimer as below in setting of GI bleed.  Patient and her  made aware that it is contraindicated in her current state and they express understanding.   - monitor closely - follow labs   - see under anemia for further detail  10/11/2020    COVID-19 [U07.1]  - Patient long term resident at [address] 19 positive 10/11   - Started on magnesium, vitamin c, zinc as well as remdesevir, dexamethasone, and antibiotics as below -D-dimer elevated at 2.06. She is not a candidate for anticoagulation or even prophylactic anticoagulation with current GI bleed and anemia history requiring frequent transfusions. Patient and her  express understanding.  - patient remains a full code   - droplet plus precautions  10/11/2020    Pneumonia of left lower lobe due to infectious organism [J18.9]  - O2 sats stable on room air overnight and this AM noted to be hypoxic around 88% on room air   - 3L supplemental O2 required  - CXR obtained which reveals left basilar airspace disease  - Continue rocephin and added zithromax. Start remdesevir infusion and dexamethasone.   - monitor O2 sat and titrate supplemental O2 as needed  10/11/2020    Colitis [K52.9]  - Patient with chronic fecal impaction and suspected stercoral colitis on previous CT abd/pelv. Evidence of large stool burden as far back as may 2018 per chart review. - Per CT abd/pelv July 2019 patient with abnormally enlarged mesorectal lypmh nodes and presence of rectal neoplasm could not be ruled out. Patient and her  have declined further workup/evaluation for this. - On flagyl and rocephin for possible colitis   - start bowel regimen with daily suppository and oral laxative/softeners - monitor closely with heme positive stool  10/11/2020    Hematemesis [K92.0]  - see under GI Bleed 10/11/2020    Elevated BUN [R79.9]  - suspect related to GI bleed and possibly in part due to dehydration   - BUN 55 -> 41   - monitor hgb and hydrate with IVFs  - repeat labs and trend  10/11/2020    Fecal impaction in rectum ([facility] [K56.41]  - see under colitis  07/03/2019    Heme positive stool [R19.5]     Anemia [D64.9]  - Longstanding history of iron deficiency anemia requiring transfusions  - EGD and colonoscopy in July 2019 with Dr. Mariya Latif and no source of identified bleeding at that time.   Admitted at [facility] for the same October 2019 and capsule endoscopy/multiple colonoscopies without evidence of bleeding. Per  biopsies at that time at [facility] negative for malignancy. - Patient and her  have declined any further upper/lower endoscopy despite acute bleeding issues and requiring frequent transfusions  -Hemoglobin 13.5 on arrival 10/10. Suspect patient dehydrated. With IV fluid hydration and acute issues as above hemoglobin 10.5 on 10/11.  - On PPI drip and carafate   - Venofer infusion x 2 ordered and resumed home oral iron   - monitor closely  07/02/2019    Declining functional status [R53.81]  - patient is a long term resident at [address] [place] [address]  - At her baseline she is bedbound and nonambulatory. She is blind. History of mild dementia although alert and oriented to self, place, situation today. She requires assistance with all ADLs including eating and drinking. Continue frequent oral care. - Long discussion with patient and her  Dariana Jasso about code status and plan of care moving forward. Explained current situation at length. Patient refuses aggressive measure such as egd and colonoscopy but wants  To remain full code which is the most aggressive final measure she would need. She is oriented and requests to remain a full code and expresses understanding but continues to refuse other procedures to address ongoing issue with bleeding. RN to assist patient with talking to her  Dariana Jasso on the phone.   - I discussed the case with her  Darianasuma Jasso this AM and updated him on the patient's condition. Will continue to update him daily and he can call nurse's station at any point with questions. I provided this phone number to him. Phone numbers listed on chart. - Per Dariana Jasso he will defer to the patient's wishes regarding code status even if he may disagree since she is lucid and able to have that conversation.  07/02/2019       The case was discussed with Dr. Anderson Sheffield by phone and plan of care reviewed      Ayesha Paredes certifies per CMS regulation for 42 CFR 424. 15(a), that the patient may reasonably be expected to be discharged or transferred to a hospital within 96 hours after admission to [address] [place] [address]    Electronically signed by FLASH Avendano on 10/11/2020 at 12:38 PM

## 2020-10-12 LAB
A/G RATIO: 1.1 (ref 0.8–2)
ALBUMIN SERPL-MCNC: 3.2 G/DL (ref 3.4–4.8)
ALP BLD-CCNC: 130 U/L (ref 25–100)
ALT SERPL-CCNC: 39 U/L (ref 4–36)
ANION GAP SERPL CALCULATED.3IONS-SCNC: 10 MMOL/L (ref 3–16)
AST SERPL-CCNC: 29 U/L (ref 8–33)
BILIRUB SERPL-MCNC: <0.2 MG/DL (ref 0.3–1.2)
BUN BLDV-MCNC: 22 MG/DL (ref 6–20)
CALCIUM SERPL-MCNC: 8.3 MG/DL (ref 8.5–10.5)
CHLORIDE BLD-SCNC: 104 MMOL/L (ref 98–107)
CO2: 23 MMOL/L (ref 20–30)
CREAT SERPL-MCNC: <0.5 MG/DL (ref 0.4–1.2)
D DIMER: 1.32 UG/ML FEU (ref 0–0.6)
GFR AFRICAN AMERICAN: >59
GFR NON-AFRICAN AMERICAN: >60
GLOBULIN: 3 G/DL
GLUCOSE BLD-MCNC: 104 MG/DL (ref 74–106)
HCT VFR BLD CALC: 30.7 % (ref 37–47)
HEMOGLOBIN: 9.3 G/DL (ref 11.5–16.5)
INR BLD: 1.08 (ref 0.87–1.14)
MCH RBC QN AUTO: 26.1 PG (ref 27–32)
MCHC RBC AUTO-ENTMCNC: 30.3 G/DL (ref 31–35)
MCV RBC AUTO: 86.2 FL (ref 80–100)
PDW BLD-RTO: 13.8 % (ref 11–16)
PLATELET # BLD: 195 K/UL (ref 150–400)
PMV BLD AUTO: 10.6 FL (ref 6–10)
POTASSIUM SERPL-SCNC: 3.7 MMOL/L (ref 3.4–5.1)
PROTHROMBIN TIME: 14 SEC (ref 11.7–14.6)
RBC # BLD: 3.56 M/UL (ref 3.8–5.8)
REASON FOR REJECTION: NORMAL
REJECTED TEST: NORMAL
SODIUM BLD-SCNC: 137 MMOL/L (ref 136–145)
TOTAL PROTEIN: 6.2 G/DL (ref 6.4–8.3)
WBC # BLD: 4.2 K/UL (ref 4–11)

## 2020-10-12 PROCEDURE — C9113 INJ PANTOPRAZOLE SODIUM, VIA: HCPCS | Performed by: INTERNAL MEDICINE

## 2020-10-12 PROCEDURE — 6360000002 HC RX W HCPCS: Performed by: PHYSICIAN ASSISTANT

## 2020-10-12 PROCEDURE — 1200000000 HC SEMI PRIVATE

## 2020-10-12 PROCEDURE — 36415 COLL VENOUS BLD VENIPUNCTURE: CPT

## 2020-10-12 PROCEDURE — 99233 SBSQ HOSP IP/OBS HIGH 50: CPT | Performed by: INTERNAL MEDICINE

## 2020-10-12 PROCEDURE — 94761 N-INVAS EAR/PLS OXIMETRY MLT: CPT

## 2020-10-12 PROCEDURE — 6360000002 HC RX W HCPCS: Performed by: INTERNAL MEDICINE

## 2020-10-12 PROCEDURE — 6370000000 HC RX 637 (ALT 250 FOR IP): Performed by: INTERNAL MEDICINE

## 2020-10-12 PROCEDURE — 6370000000 HC RX 637 (ALT 250 FOR IP): Performed by: PHYSICIAN ASSISTANT

## 2020-10-12 PROCEDURE — 2580000003 HC RX 258: Performed by: INTERNAL MEDICINE

## 2020-10-12 PROCEDURE — 2580000003 HC RX 258: Performed by: PHYSICIAN ASSISTANT

## 2020-10-12 PROCEDURE — 80053 COMPREHEN METABOLIC PANEL: CPT

## 2020-10-12 PROCEDURE — 85379 FIBRIN DEGRADATION QUANT: CPT

## 2020-10-12 PROCEDURE — 2700000000 HC OXYGEN THERAPY PER DAY

## 2020-10-12 PROCEDURE — 2500000003 HC RX 250 WO HCPCS: Performed by: PHYSICIAN ASSISTANT

## 2020-10-12 PROCEDURE — 85610 PROTHROMBIN TIME: CPT

## 2020-10-12 PROCEDURE — 85027 COMPLETE CBC AUTOMATED: CPT

## 2020-10-12 RX ORDER — PANTOPRAZOLE SODIUM 40 MG/10ML
INJECTION, POWDER, LYOPHILIZED, FOR SOLUTION INTRAVENOUS
Status: DISCONTINUED
Start: 2020-10-12 | End: 2020-10-12 | Stop reason: ALTCHOICE

## 2020-10-12 RX ORDER — VITAMIN B COMPLEX
1000 TABLET ORAL DAILY
Status: DISCONTINUED | OUTPATIENT
Start: 2020-10-12 | End: 2020-10-16 | Stop reason: HOSPADM

## 2020-10-12 RX ADMIN — FERROUS SULFATE TAB EC 324 MG (65 MG FE EQUIVALENT) 324 MG: 324 (65 FE) TABLET DELAYED RESPONSE at 12:19

## 2020-10-12 RX ADMIN — FERROUS SULFATE TAB EC 324 MG (65 MG FE EQUIVALENT) 324 MG: 324 (65 FE) TABLET DELAYED RESPONSE at 07:46

## 2020-10-12 RX ADMIN — DONEPEZIL HYDROCHLORIDE 10 MG: 5 TABLET, FILM COATED ORAL at 20:36

## 2020-10-12 RX ADMIN — MORPHINE SULFATE 15 MG: 15 TABLET, FILM COATED, EXTENDED RELEASE ORAL at 07:47

## 2020-10-12 RX ADMIN — CALCIUM POLYCARBOPHIL 625 MG TABLET 625 MG: at 07:46

## 2020-10-12 RX ADMIN — SUCRALFATE 1 G: 1 TABLET ORAL at 20:36

## 2020-10-12 RX ADMIN — SODIUM CHLORIDE 8 MG/HR: 9 INJECTION, SOLUTION INTRAVENOUS at 01:21

## 2020-10-12 RX ADMIN — BISACODYL 10 MG: 10 SUPPOSITORY RECTAL at 07:46

## 2020-10-12 RX ADMIN — NIFEDIPINE 60 MG: 60 TABLET, FILM COATED, EXTENDED RELEASE ORAL at 07:48

## 2020-10-12 RX ADMIN — TOPIRAMATE 25 MG: 25 TABLET, FILM COATED ORAL at 20:36

## 2020-10-12 RX ADMIN — MAGNESIUM OXIDE TAB 400 MG (241.3 MG ELEMENTAL MG) 400 MG: 400 (241.3 MG) TAB at 07:47

## 2020-10-12 RX ADMIN — SENNOSIDES 17.2 MG: 8.6 TABLET, FILM COATED ORAL at 20:36

## 2020-10-12 RX ADMIN — METRONIDAZOLE 500 MG: 500 TABLET ORAL at 01:09

## 2020-10-12 RX ADMIN — SUCRALFATE 1 G: 1 TABLET ORAL at 07:46

## 2020-10-12 RX ADMIN — Medication 1000 UNITS: at 17:26

## 2020-10-12 RX ADMIN — POLYETHYLENE GLYCOL 3350 17 G: 17 POWDER, FOR SOLUTION ORAL at 07:47

## 2020-10-12 RX ADMIN — MULTIPLE VITAMINS W/ MINERALS TAB 1 TABLET: TAB at 07:47

## 2020-10-12 RX ADMIN — IRON SUCROSE 200 MG: 20 INJECTION, SOLUTION INTRAVENOUS at 15:20

## 2020-10-12 RX ADMIN — CALCIUM POLYCARBOPHIL 625 MG TABLET 625 MG: at 20:37

## 2020-10-12 RX ADMIN — OXYCODONE HYDROCHLORIDE AND ACETAMINOPHEN 500 MG: 500 TABLET ORAL at 07:47

## 2020-10-12 RX ADMIN — REMDESIVIR 100 MG: 100 INJECTION, POWDER, LYOPHILIZED, FOR SOLUTION INTRAVENOUS at 13:21

## 2020-10-12 RX ADMIN — AZITHROMYCIN DIHYDRATE 500 MG: 500 INJECTION, POWDER, LYOPHILIZED, FOR SOLUTION INTRAVENOUS at 12:19

## 2020-10-12 RX ADMIN — SENNOSIDES 17.2 MG: 8.6 TABLET, FILM COATED ORAL at 07:46

## 2020-10-12 RX ADMIN — MORPHINE SULFATE 15 MG: 15 TABLET, FILM COATED, EXTENDED RELEASE ORAL at 20:37

## 2020-10-12 RX ADMIN — TOPIRAMATE 25 MG: 25 TABLET, FILM COATED ORAL at 07:47

## 2020-10-12 RX ADMIN — DEXAMETHASONE 6 MG: 4 TABLET ORAL at 07:46

## 2020-10-12 RX ADMIN — METRONIDAZOLE 500 MG: 500 TABLET ORAL at 12:19

## 2020-10-12 RX ADMIN — SUCRALFATE 1 G: 1 TABLET ORAL at 17:26

## 2020-10-12 RX ADMIN — VENLAFAXINE 150 MG: 75 TABLET ORAL at 07:47

## 2020-10-12 RX ADMIN — ZINC SULFATE 220 MG (50 MG) CAPSULE 50 MG: CAPSULE at 07:47

## 2020-10-12 RX ADMIN — METRONIDAZOLE 500 MG: 500 TABLET ORAL at 17:26

## 2020-10-12 RX ADMIN — Medication 10 ML: at 07:48

## 2020-10-12 RX ADMIN — FERROUS SULFATE TAB EC 324 MG (65 MG FE EQUIVALENT) 324 MG: 324 (65 FE) TABLET DELAYED RESPONSE at 17:26

## 2020-10-12 RX ADMIN — DOCUSATE SODIUM 100 MG: 100 CAPSULE, LIQUID FILLED ORAL at 07:46

## 2020-10-12 RX ADMIN — MAGNESIUM HYDROXIDE 5 ML: 2400 SUSPENSION ORAL at 07:47

## 2020-10-12 RX ADMIN — SUCRALFATE 1 G: 1 TABLET ORAL at 12:19

## 2020-10-12 RX ADMIN — CEFTRIAXONE 1 G: 1 INJECTION, POWDER, FOR SOLUTION INTRAMUSCULAR; INTRAVENOUS at 01:10

## 2020-10-12 RX ADMIN — ATORVASTATIN CALCIUM 10 MG: 10 TABLET, FILM COATED ORAL at 20:36

## 2020-10-12 ASSESSMENT — PAIN SCALES - GENERAL
PAINLEVEL_OUTOF10: 0

## 2020-10-12 NOTE — PROGRESS NOTES
Progress Note      Subjective:   Chief complaint:   Hematemesis    Interval History:   Pt with increased O2 demand overnight; up to 15L NRB today. O2 sats drop to 80's if pt removes mask. I discussed future treatment options: bipap, intubation, etc if O2 demands continued to increase. Pt clearly states she does not want to be intubate. Code status updated to DNR/DNI. Updated pt's , Luma Adames. Review of systems:     Constitutional:  Denies fever or chills. Positive for generalized weakness and fatigue. Eyes:  Denies change in visual acuity or discharge. Positive for blindness. HENT:  Denies nasal congestion or sore throat. Respiratory:  Denies cough or shortness of breath. Cardiovascular:  Denies chest pain, palpitation or swelling in LEs. GI:  Denies abdominal pain, nausea, vomiting, bloody stools or diarrhea. :  Denies dysuria or frequency. Musculoskeletal:  Denies back pain or joint pain. Integument:  Denies rash or itching. Neurologic:  Denies headache, focal weakness or sensory changes. Endocrine:  Denies polyuria or polydipsia. Lymphatic:  Denies swollen glands or night sweats. Psychiatric:  Denies depression or anxiety. Past medical history, surgical history, family history and social history reviewed and unchanged compared to H&P earlier this admission.     Medications:   Scheduled Meds:   cefTRIAXone (ROCEPHIN) IV  1 g Intravenous Q24H    metroNIDAZOLE  500 mg Oral Q8H    magnesium oxide  400 mg Oral Daily    zinc sulfate  50 mg Oral Daily    ascorbic acid  500 mg Oral Daily    bisacodyl  10 mg Rectal Daily    donepezil  10 mg Oral Nightly    morphine  15 mg Oral BID    therapeutic multivitamin-minerals  1 tablet Oral Daily    polycarbophil  625 mg Oral BID    senna  2 tablet Oral BID    sucralfate  1 g Oral 4x Daily    atorvastatin  10 mg Oral Daily    topiramate  25 mg Oral BID    venlafaxine  150 mg Oral Daily    sodium chloride flush  10 mL Intravenous 2 times per day    docusate sodium  100 mg Oral Daily    polyethylene glycol  17 g Oral Daily    azithromycin  500 mg Intravenous Q24H    remdesivir IVPB  100 mg Intravenous Q24H    ferrous sulfate  324 mg Oral TID WC    dexamethasone  6 mg Oral Daily    iron sucrose  200 mg Intravenous Q24H    NIFEdipine  60 mg Oral Daily     Continuous Infusions:   pantoprozole (PROTONIX) infusion 8 mg/hr (10/12/20 0121)       Objective:     Vital Signs  Temp: 96.9 °F (36.1 °C)  Pulse: 89  Resp: 20  BP: (!) 122/58(manual)  SpO2: 97 %  O2 Device: Non-rebreather mask  O2 Flow Rate (L/min): 15 L/min    Vital signs reviewed in electronic charts. Physical exam    Constitutional:  Well developed, thin and frail elderly lady lying in bed in no acute distress. On 15 L nonrebreather mask. Eyes: Blind in both eyes. Keeps eyelids closed most of the time. HENT:  Atraumatic, external ears normal, external nose/nares normal, oropharynx moist.  Neck:  Supple. No JVD or thyromegaly. Respiratory:  No respiratory distress, no rales, no wheezing. Decreased at bases. On 15 L nonrebreather. Cardiovascular:  Normal rate, normal rhythm, no murmurs, no gallops, no rubs. GI:  Soft, nondistended, normal bowel sounds, nontender, no organomegaly, no mass. :  No costovertebral angle tenderness. Musculoskeletal:  No edema, no tenderness, no obvious deformities. Patient has severe contractures of lower extremities. Integument:  Well hydrated, no rash. Lymphatic:  No cervical or axillary lymphadenopathy noted. Neurologic:  Alert & oriented x 3,  no focal deficits noted. Strength is equal throughout. Psychiatric:  Speech and behavior appropriate.     Results:     Lab Results   Component Value Date    WBC 4.2 10/12/2020    HGB 9.3 (L) 10/12/2020    HCT 30.7 (L) 10/12/2020    MCV 86.2 10/12/2020     10/12/2020       Lab Results   Component Value Date     10/12/2020    K 3.7 10/12/2020    K 3.5 10/10/2020     10/12/2020 CO2 23 10/12/2020    BUN 22 10/12/2020    CREATININE <0.5 10/12/2020    GLUCOSE 104 10/12/2020    CALCIUM 8.3 10/12/2020        Assessment and Plan: Active Hospital Problems    Diagnosis Date Noted    GI bleed [K92.2]  -Present on admission with hematic emesis. Hemoccult positive. History of iron deficiency anemia requiring recurrent transfusions. Per chart review and family report, patient has had multiple upper and lower endoscopies that failed to identify any source of bleeding. Family and patient refused any further endoscopies. -Hemoglobin 13.5 on arrival.  Down to 10.5 on 10/11 and 9.3 on 10/12.  -Continue PPI drip and Carafate  -Monitor and transfuse for hemoglobin less than 7   10/11/2020    COVID-19 [U07.1]  -COVID-19 +10/11  -Patient is a longstanding resident of [address] [place] [address]  -Started on magnesium, vitamin C, zinc, remdesevir, dexamethasone, Rocephin and azithromycin  -Holding therapeutic anticoagulation secondary to GI bleed. Patient and her  made aware it is contraindicated in her current state and both expressed understanding.  -Continue supplemental oxygen; patiently currently requiring 15 L nonrebreather  -Patient agreeable for BiPAP but refuses intubation. CODE STATUS updated. -Droplet plus precautions   10/11/2020    Pneumonia of left lower lobe due to infectious organism [J18.9]  -Secondary to COVID-19  -Chest x-ray 10/11 with left basilar airspace disease  -Continue treatment with Rocephin and azithromycin  -Continue treatment for COVID-19 as outlined above  -Continue supplemental oxygen   10/11/2020    Colitis [K52.9]  -Patient with chronic fecal impaction and suspected stercoral colitis on previous CT abdomen pelvis. Evidence of large stool burden as far back as May 2018 per chart review.  -Per CT abdomen/pelvis July 2019 patient with abnormally enlarged mesorectal lymph nodes and presence of rectal neoplasm could not be ruled out.   Patient and her  care.  -CODE STATUS updated to DNR/DNI per patient request  -spoke with pt's , Nell Mares about change in code status and increased O2 demand. All questions answered. 07/02/2019     Patient was seen and examined by Dr. Carrillo Evans and plan of care reviewed.       Electronically signed by FLASH Ibrahim on 10/12/2020 at 2:47 PM

## 2020-10-12 NOTE — PROGRESS NOTES
Pt's sat was reading in the 80s on a 3 lpm nc. I increased her to 10 lpm nc and sat was still reading in the high 80s. I obtained an ABG at this time, showing a sat of 81.9%. I increased her to 15 lpm nc at this time. Sat was 86-88%. She is a mouth breather, so I then placed her on a NRB at 15 lpm. Her sat immediately came up to 96%. Patient was resting, in no distress at this time and understood the need for the increased O2 and the mask placement.  DW, RRT

## 2020-10-12 NOTE — PROGRESS NOTES
Medication Reconciliation  Med rec completed utilizing med list from [address] [place] [address].   Changes made to home med list are below:  -Changed cyproheptadine from 4mg TID to 8mg TID per [facility] list  -Changed glycerin mouth spray from daily prn to QID per NH list.  -Removed Vitamin C from home med list as it was not on the NH list.    Elke Johnson, PharmD

## 2020-10-12 NOTE — PROGRESS NOTES
Pt awakened for meds and was agitated. Took oral med. Asked pt if she had urinated and pt stated \"no I'll pee when I want to , I've been peeing on my own forever. \" Pt hit at nurse when trying to reposition pt. Pt states \"why don't you leave me alone to rest.\" Explained to pt that there are meds to be given at certain times for her care and offered emotional support. Will monitor.

## 2020-10-12 NOTE — PROGRESS NOTES
Reported issues with pt sats and respiratory placing pt on nonrebreather to [facility], on call. States he would like to talk to pt . Gave his number, but he was unable to reach him. States he wants to discuss code status. Wants nurse to facilitate a three way call. Attempted to phone , [employer] Cancino, with no answer at numbers provided. If he returns call later will try to contact [facility] for a conference call.

## 2020-10-13 LAB
A/G RATIO: 0.9 (ref 0.8–2)
ALBUMIN SERPL-MCNC: 3.3 G/DL (ref 3.4–4.8)
ALP BLD-CCNC: 148 U/L (ref 25–100)
ALT SERPL-CCNC: 29 U/L (ref 4–36)
ANION GAP SERPL CALCULATED.3IONS-SCNC: 14 MMOL/L (ref 3–16)
AST SERPL-CCNC: 29 U/L (ref 8–33)
BILIRUB SERPL-MCNC: <0.2 MG/DL (ref 0.3–1.2)
BUN BLDV-MCNC: 14 MG/DL (ref 6–20)
CALCIUM SERPL-MCNC: 8.6 MG/DL (ref 8.5–10.5)
CHLORIDE BLD-SCNC: 94 MMOL/L (ref 98–107)
CO2: 23 MMOL/L (ref 20–30)
CREAT SERPL-MCNC: 0.6 MG/DL (ref 0.4–1.2)
GFR AFRICAN AMERICAN: >59
GFR NON-AFRICAN AMERICAN: >60
GLOBULIN: 3.6 G/DL
GLUCOSE BLD-MCNC: 110 MG/DL (ref 74–106)
HCT VFR BLD CALC: 36.3 % (ref 37–47)
HEMOGLOBIN: 11.1 G/DL (ref 11.5–16.5)
INR BLD: 1.17 (ref 0.87–1.14)
MAGNESIUM: 1.3 MG/DL (ref 1.7–2.4)
MCH RBC QN AUTO: 26.4 PG (ref 27–32)
MCHC RBC AUTO-ENTMCNC: 30.6 G/DL (ref 31–35)
MCV RBC AUTO: 86.4 FL (ref 80–100)
PDW BLD-RTO: 13.8 % (ref 11–16)
PLATELET # BLD: 223 K/UL (ref 150–400)
PMV BLD AUTO: 11.2 FL (ref 6–10)
POTASSIUM REFLEX MAGNESIUM: 3.3 MMOL/L (ref 3.4–5.1)
PROTHROMBIN TIME: 14.9 SEC (ref 11.7–14.6)
RBC # BLD: 4.2 M/UL (ref 3.8–5.8)
SODIUM BLD-SCNC: 131 MMOL/L (ref 136–145)
TOTAL PROTEIN: 6.9 G/DL (ref 6.4–8.3)
WBC # BLD: 7.7 K/UL (ref 4–11)

## 2020-10-13 PROCEDURE — 6370000000 HC RX 637 (ALT 250 FOR IP): Performed by: PHYSICIAN ASSISTANT

## 2020-10-13 PROCEDURE — 2580000003 HC RX 258: Performed by: PHYSICIAN ASSISTANT

## 2020-10-13 PROCEDURE — 2580000003 HC RX 258: Performed by: INTERNAL MEDICINE

## 2020-10-13 PROCEDURE — 85027 COMPLETE CBC AUTOMATED: CPT

## 2020-10-13 PROCEDURE — 1200000000 HC SEMI PRIVATE

## 2020-10-13 PROCEDURE — C9113 INJ PANTOPRAZOLE SODIUM, VIA: HCPCS | Performed by: INTERNAL MEDICINE

## 2020-10-13 PROCEDURE — 6360000002 HC RX W HCPCS: Performed by: INTERNAL MEDICINE

## 2020-10-13 PROCEDURE — 83735 ASSAY OF MAGNESIUM: CPT

## 2020-10-13 PROCEDURE — 94761 N-INVAS EAR/PLS OXIMETRY MLT: CPT

## 2020-10-13 PROCEDURE — 2500000003 HC RX 250 WO HCPCS: Performed by: PHYSICIAN ASSISTANT

## 2020-10-13 PROCEDURE — 6360000002 HC RX W HCPCS: Performed by: PHYSICIAN ASSISTANT

## 2020-10-13 PROCEDURE — 36415 COLL VENOUS BLD VENIPUNCTURE: CPT

## 2020-10-13 PROCEDURE — 6370000000 HC RX 637 (ALT 250 FOR IP): Performed by: INTERNAL MEDICINE

## 2020-10-13 PROCEDURE — 80053 COMPREHEN METABOLIC PANEL: CPT

## 2020-10-13 PROCEDURE — 99231 SBSQ HOSP IP/OBS SF/LOW 25: CPT | Performed by: INTERNAL MEDICINE

## 2020-10-13 PROCEDURE — 85610 PROTHROMBIN TIME: CPT

## 2020-10-13 PROCEDURE — 2700000000 HC OXYGEN THERAPY PER DAY

## 2020-10-13 RX ORDER — MAGNESIUM SULFATE HEPTAHYDRATE 40 MG/ML
4 INJECTION, SOLUTION INTRAVENOUS ONCE
Status: COMPLETED | OUTPATIENT
Start: 2020-10-13 | End: 2020-10-13

## 2020-10-13 RX ORDER — POTASSIUM CHLORIDE 20 MEQ/1
40 TABLET, EXTENDED RELEASE ORAL ONCE
Status: COMPLETED | OUTPATIENT
Start: 2020-10-13 | End: 2020-10-13

## 2020-10-13 RX ADMIN — MAGNESIUM OXIDE TAB 400 MG (241.3 MG ELEMENTAL MG) 400 MG: 400 (241.3 MG) TAB at 09:52

## 2020-10-13 RX ADMIN — METRONIDAZOLE 500 MG: 500 TABLET ORAL at 09:51

## 2020-10-13 RX ADMIN — Medication 1000 UNITS: at 09:52

## 2020-10-13 RX ADMIN — Medication 10 ML: at 10:00

## 2020-10-13 RX ADMIN — Medication 10 ML: at 21:02

## 2020-10-13 RX ADMIN — CEFTRIAXONE 1 G: 1 INJECTION, POWDER, FOR SOLUTION INTRAMUSCULAR; INTRAVENOUS at 03:38

## 2020-10-13 RX ADMIN — MAGNESIUM SULFATE HEPTAHYDRATE 4 G: 40 INJECTION, SOLUTION INTRAVENOUS at 11:32

## 2020-10-13 RX ADMIN — CALCIUM POLYCARBOPHIL 625 MG TABLET 625 MG: at 21:00

## 2020-10-13 RX ADMIN — REMDESIVIR 100 MG: 100 INJECTION, POWDER, LYOPHILIZED, FOR SOLUTION INTRAVENOUS at 14:00

## 2020-10-13 RX ADMIN — POLYETHYLENE GLYCOL 3350 17 G: 17 POWDER, FOR SOLUTION ORAL at 09:53

## 2020-10-13 RX ADMIN — TOPIRAMATE 25 MG: 25 TABLET, FILM COATED ORAL at 09:52

## 2020-10-13 RX ADMIN — SENNOSIDES 17.2 MG: 8.6 TABLET, FILM COATED ORAL at 09:52

## 2020-10-13 RX ADMIN — MORPHINE SULFATE 15 MG: 15 TABLET, FILM COATED, EXTENDED RELEASE ORAL at 21:00

## 2020-10-13 RX ADMIN — AZITHROMYCIN DIHYDRATE 500 MG: 500 INJECTION, POWDER, LYOPHILIZED, FOR SOLUTION INTRAVENOUS at 13:51

## 2020-10-13 RX ADMIN — CALCIUM POLYCARBOPHIL 625 MG TABLET 625 MG: at 09:52

## 2020-10-13 RX ADMIN — MORPHINE SULFATE 15 MG: 15 TABLET, FILM COATED, EXTENDED RELEASE ORAL at 09:51

## 2020-10-13 RX ADMIN — FERROUS SULFATE TAB EC 324 MG (65 MG FE EQUIVALENT) 324 MG: 324 (65 FE) TABLET DELAYED RESPONSE at 18:54

## 2020-10-13 RX ADMIN — SENNOSIDES 17.2 MG: 8.6 TABLET, FILM COATED ORAL at 20:59

## 2020-10-13 RX ADMIN — MULTIPLE VITAMINS W/ MINERALS TAB 1 TABLET: TAB at 09:53

## 2020-10-13 RX ADMIN — OXYCODONE HYDROCHLORIDE AND ACETAMINOPHEN 500 MG: 500 TABLET ORAL at 09:52

## 2020-10-13 RX ADMIN — SUCRALFATE 1 G: 1 TABLET ORAL at 17:50

## 2020-10-13 RX ADMIN — TOPIRAMATE 25 MG: 25 TABLET, FILM COATED ORAL at 21:00

## 2020-10-13 RX ADMIN — DONEPEZIL HYDROCHLORIDE 10 MG: 5 TABLET, FILM COATED ORAL at 21:00

## 2020-10-13 RX ADMIN — NIFEDIPINE 60 MG: 60 TABLET, FILM COATED, EXTENDED RELEASE ORAL at 09:52

## 2020-10-13 RX ADMIN — SUCRALFATE 1 G: 1 TABLET ORAL at 09:52

## 2020-10-13 RX ADMIN — DEXAMETHASONE 6 MG: 4 TABLET ORAL at 09:52

## 2020-10-13 RX ADMIN — SUCRALFATE 1 G: 1 TABLET ORAL at 21:00

## 2020-10-13 RX ADMIN — FERROUS SULFATE TAB EC 324 MG (65 MG FE EQUIVALENT) 324 MG: 324 (65 FE) TABLET DELAYED RESPONSE at 09:53

## 2020-10-13 RX ADMIN — ATORVASTATIN CALCIUM 10 MG: 10 TABLET, FILM COATED ORAL at 21:00

## 2020-10-13 RX ADMIN — BISACODYL 10 MG: 10 SUPPOSITORY RECTAL at 09:53

## 2020-10-13 RX ADMIN — SODIUM CHLORIDE 8 MG/HR: 9 INJECTION, SOLUTION INTRAVENOUS at 09:53

## 2020-10-13 RX ADMIN — POTASSIUM CHLORIDE 40 MEQ: 1500 TABLET, EXTENDED RELEASE ORAL at 09:51

## 2020-10-13 RX ADMIN — VENLAFAXINE 150 MG: 75 TABLET ORAL at 09:52

## 2020-10-13 RX ADMIN — METRONIDAZOLE 500 MG: 500 TABLET ORAL at 18:24

## 2020-10-13 RX ADMIN — DOCUSATE SODIUM 100 MG: 100 CAPSULE, LIQUID FILLED ORAL at 09:53

## 2020-10-13 RX ADMIN — METRONIDAZOLE 500 MG: 500 TABLET ORAL at 03:37

## 2020-10-13 RX ADMIN — ZINC SULFATE 220 MG (50 MG) CAPSULE 50 MG: CAPSULE at 09:51

## 2020-10-13 ASSESSMENT — PAIN SCALES - GENERAL
PAINLEVEL_OUTOF10: 5
PAINLEVEL_OUTOF10: 10

## 2020-10-13 NOTE — PROGRESS NOTES
Progress Note      Subjective:   Chief complaint:  Hematemesis    Interval History:   Patient seen and examined this afternoon. Still on 15 L nonrebreather mask today. At time of exam, patient had removed mask and oxygen sats remained stable. She reports feeling generally fatigued and weak. She denies any other complaints. Spoke with patient's , Trev Mcgowan and provided update on clinical status. Answered all questions. Review of systems:   Constitutional:  Denies fever or chills. Positive for generalized weakness and fatigue. Eyes:  Denies change in visual acuity or discharge. Positive for blindness. HENT:  Denies nasal congestion or sore throat. Respiratory:  Denies cough or shortness of breath. Cardiovascular:  Denies chest pain, palpitation or swelling in LEs. GI:  Denies abdominal pain, nausea, vomiting, bloody stools or diarrhea. :  Denies dysuria or frequency. Musculoskeletal:  Denies back pain or joint pain. Integument:  Denies rash or itching. Neurologic:  Denies headache, focal weakness or sensory changes. Endocrine:  Denies polyuria or polydipsia. Lymphatic:  Denies swollen glands or night sweats. Psychiatric:  Denies depression or anxiety. Past medical history, surgical history, family history and social history reviewed and unchanged compared to H&P earlier this admission.     Medications:   Scheduled Meds:   Magnesium Sulfate  4 g Intravenous Once    vitamin D3  1,000 Units Oral Daily    cefTRIAXone (ROCEPHIN) IV  1 g Intravenous Q24H    metroNIDAZOLE  500 mg Oral Q8H    magnesium oxide  400 mg Oral Daily    zinc sulfate  50 mg Oral Daily    ascorbic acid  500 mg Oral Daily    bisacodyl  10 mg Rectal Daily    donepezil  10 mg Oral Nightly    morphine  15 mg Oral BID    therapeutic multivitamin-minerals  1 tablet Oral Daily    polycarbophil  625 mg Oral BID    senna  2 tablet Oral BID    sucralfate  1 g Oral 4x Daily    atorvastatin  10 mg Oral Daily  topiramate  25 mg Oral BID    venlafaxine  150 mg Oral Daily    sodium chloride flush  10 mL Intravenous 2 times per day    docusate sodium  100 mg Oral Daily    polyethylene glycol  17 g Oral Daily    azithromycin  500 mg Intravenous Q24H    remdesivir IVPB  100 mg Intravenous Q24H    ferrous sulfate  324 mg Oral TID WC    dexamethasone  6 mg Oral Daily    NIFEdipine  60 mg Oral Daily     Continuous Infusions:   pantoprozole (PROTONIX) infusion 8 mg/hr (10/13/20 0953)       Objective:     Vital Signs  Temp: 97.8 °F (36.6 °C)  Pulse: 84  Resp: 24  BP: (!) 140/62  SpO2: 99 %  O2 Device: Non-rebreather mask  O2 Flow Rate (L/min): 15 L/min    Vital signs reviewed in electronic charts. Physical exam  Constitutional:  Well developed, thin and frail elderly lady lying in bed in no acute distress. On 15 L nonrebreather mask. Eyes: Blind in both eyes. Keeps eyelids closed most of the time. HENT:  Atraumatic, external ears normal, external nose/nares normal, oropharynx moist.  Neck:  Supple. No JVD or thyromegaly. Respiratory:  No respiratory distress, no rales, no wheezing. Decreased at bases. On 15 L nonrebreather. Cardiovascular:  Normal rate, normal rhythm, no murmurs, no gallops, no rubs. GI:  Soft, nondistended, normal bowel sounds, nontender, no organomegaly, no mass. :  No costovertebral angle tenderness. Musculoskeletal:  No edema, no tenderness, no obvious deformities. Patient has severe contractures of lower extremities. Integument:  Well hydrated, no rash. Lymphatic:  No cervical or axillary lymphadenopathy noted. Neurologic:  Alert & oriented x 3,  no focal deficits noted. Strength is equal throughout. Psychiatric:  Speech and behavior appropriate.     Results:     Lab Results   Component Value Date    WBC 7.7 10/13/2020    HGB 11.1 (L) 10/13/2020    HCT 36.3 (L) 10/13/2020    MCV 86.4 10/13/2020     10/13/2020       Lab Results   Component Value Date     10/13/2020    K 3.3 10/13/2020    CL 94 10/13/2020    CO2 23 10/13/2020    BUN 14 10/13/2020    CREATININE 0.6 10/13/2020    GLUCOSE 110 10/13/2020    CALCIUM 8.6 10/13/2020        Assessment and Plan: Active Hospital Problems     Diagnosis Date Noted    GI bleed [K92.2]  -Present on admission with hematic emesis. Hemoccult positive. History of iron deficiency anemia requiring recurrent transfusions. Per chart review and family report, patient has had multiple upper and lower endoscopies that failed to identify any source of bleeding. Family and patient refused any further endoscopies. -Hemoglobin 13.5 on arrival.  Down to 10.5 on 10/11 and 9.3 on 10/12. Improved to 11.1 on 10/13.  -Continue PPI drip and Carafate  -Monitor and transfuse for hemoglobin less than 7    10/11/2020    COVID-19 [U07.1]  -COVID-19 +10/11  -Patient is a longstanding resident of [address] [place] [address]  -Started on magnesium, vitamin C, zinc, remdesevir, dexamethasone, Rocephin and azithromycin  -Holding therapeutic anticoagulation secondary to GI bleed. Patient and her  made aware it is contraindicated in her current state and both expressed understanding.  -Continue supplemental oxygen; patiently currently requiring 15 L nonrebreather  -Patient agreeable for BiPAP but refuses intubation. CODE STATUS DNR/DNI.  -Droplet plus precautions    10/11/2020    Pneumonia of left lower lobe due to infectious organism [J18.9]  -Secondary to COVID-19  -Chest x-ray 10/11 with left basilar airspace disease  -Continue treatment with Rocephin and azithromycin  -Continue treatment for COVID-19 as outlined above  -Continue supplemental oxygen    10/11/2020    Colitis [K52.9]  -Patient with chronic fecal impaction and suspected stercoral colitis on previous CT abdomen pelvis.   Evidence of large stool burden as far back as May 2018 per chart review.  -Per CT abdomen/pelvis July 2019 patient with abnormally enlarged mesorectal lymph nodes and presence of rectal neoplasm could not be ruled out. Patient and her  have declined further work-up for this.  -On Flagyl and Rocephin for possible colitis  -Continue bowel regimen with daily suppositories and oral laxatives, stool softeners    10/11/2020    Hematemesis [K92.0]  -Now resolved  -Patient and her  have declined further endoscopies    10/11/2020    Elevated BUN [R79.9]  -Suspect related to GI bleed and possibly in part due to dehydration  -BUN trending down with hydration    10/11/2020    Benign essential HTN [I10]  -Continue Procardia    10/11/2020    History of dementia [Z86.59]  -Mild. Continue Aricept. Patient currently alert and oriented x3.         Other constipation [K59.09]  -Continue bowel regimen         Fecal impaction in rectum (HCC) [K56.41]  -Longstanding issue per chart review  -Continue bowel regimen    07/03/2019    Heme positive stool [R19.5]  -See under anemia         Anemia [D64.9]  -Longstanding history of iron deficiency anemia requiring transfusions  -EGD and colonoscopy in July 2019 with Dr. Kenyon Evans with no source of bleeding identified at that time. Admitted at [facility] for the same in October 2019 and underwent capsule endoscopy and multiple colonoscopies without evidence of bleeding. Patient has been reports biopsies were negative for malignancy at [facility]. -Patient and her  have declined any further endoscopies  -Hemoglobin 13.5 on arrival.  Down drifted to 10.5 on 10/11 after receiving IV fluids and 9.3 today.  -Continue PPI drip and Carafate  -Received Venofer infusion x2; continue oral iron  -Monitor and transfuse for hemoglobin less than 7    07/02/2019    Declining functional status [R53.81]  -Patient is a long-term resident of [facility]. At baseline she is bedbound and nonambulatory with severe contractures in her lower extremities. She is also blind with history of mild dementia although alert and oriented x3 currently.   She requires assistance with all ADLs including eating and drinking. Continue frequent oral care. -CODE STATUS DNR/DNI per patient request        Patient was seen and examined by Dr. Jacinta Michelle and plan of care reviewed.       Electronically signed by FLASH Calhoun on 10/13/2020 at 1:53 PM

## 2020-10-13 NOTE — FLOWSHEET NOTE
10/13/20 1030   Assessment   Charting Type Shift assessment   Neurological   Neuro (WDL) X   Level of Consciousness 0   [place] Coma Scale   Eye Opening 4   Best Verbal Response 4   Best Motor Response 6   [place] Coma Scale Score 14   HEENT   HEENT (WDL) X   Teeth Missing teeth   Respiratory   Respiratory (WDL) X   Respiratory Pattern Regular   Respiratory Depth Normal   Respiratory Quality/Effort Unlabored   Chest Assessment Chest expansion symmetrical   Breath Sounds   Right Upper Lobe Clear   Right Middle Lobe Clear   Right Lower Lobe Diminished   Left Upper Lobe Clear   Left Lower Lobe Diminished   Cardiac   Cardiac (WDL) X   Gastrointestinal   Abdominal (WDL) X   Abdomen Inspection Distended   Tenderness No guarding   RUQ Bowel Sounds Active   LUQ Bowel Sounds Active   RLQ Bowel Sounds Active   LLQ Bowel Sounds Active   Peripheral Vascular   Peripheral Vascular (WDL) WDL   Edema None   Skin Color/Condition   Skin Color/Condition (WDL) WDL   Skin Integrity   Skin Integrity (WDL) X   Skin Integrity Redness   Location left hip   Preventative Dressing Yes   Dressing Site   (left hip)   Assessed this shift? Yes  (CDI)   Musculoskeletal   Musculoskeletal (WDL) X   RL Extremity Contracture   LL Extremity Contracture   Genitourinary   Genitourinary (WDL) X   Urine Assessment   Urine Color Yellow/straw   Psychosocial   Psychosocial (WDL) WDL   Pt awake in bed. Pt alert to person, place, and year. Pt unable to state month. Pt reoriented. Pt appears in no acute distress. Pt currently on 15 L non rebreather. Pt currently on telemetry monitoring showing NSR. Pt lung sounds clear with diminished sounds in bases cassy. Pt encouraged to cough and deep breathe. Pt mepilex on hip CDI. Pt call bell and bedside table within reach. Will continue to monitor pt.

## 2020-10-13 NOTE — PLAN OF CARE
Problem: Airway Clearance - Ineffective  Goal: Achieve or maintain patent airway  10/13/2020 1144 by Kimani Person RN  Outcome: Ongoing  10/13/2020 0426 by Yue Barrera RN  Outcome: Ongoing     Problem: Gas Exchange - Impaired  Goal: Absence of hypoxia  10/13/2020 1144 by Kimani Person RN  Outcome: Ongoing  10/13/2020 0426 by Yue Barrera RN  Outcome: Ongoing     Problem: Breathing Pattern - Ineffective  Goal: Ability to achieve and maintain a regular respiratory rate  10/13/2020 0426 by Yue Barrera RN  Outcome: Ongoing     Problem: Risk for Fluid Volume Deficit  Goal: Maintain normal heart rhythm  Outcome: Ongoing     Problem: Skin Integrity:  Goal: Will show no infection signs and symptoms  Description: Will show no infection signs and symptoms  Outcome: Ongoing

## 2020-10-14 LAB
A/G RATIO: 1 (ref 0.8–2)
ALBUMIN SERPL-MCNC: 3.4 G/DL (ref 3.4–4.8)
ALP BLD-CCNC: 311 U/L (ref 25–100)
ALT SERPL-CCNC: 58 U/L (ref 4–36)
ANION GAP SERPL CALCULATED.3IONS-SCNC: 12 MMOL/L (ref 3–16)
AST SERPL-CCNC: 99 U/L (ref 8–33)
BILIRUB SERPL-MCNC: 0.3 MG/DL (ref 0.3–1.2)
BUN BLDV-MCNC: 13 MG/DL (ref 6–20)
CALCIUM SERPL-MCNC: 8.8 MG/DL (ref 8.5–10.5)
CHLORIDE BLD-SCNC: 96 MMOL/L (ref 98–107)
CO2: 24 MMOL/L (ref 20–30)
CREAT SERPL-MCNC: <0.5 MG/DL (ref 0.4–1.2)
D DIMER: 0.6 UG/ML FEU (ref 0–0.6)
FIBRINOGEN: 565 MG/DL (ref 200–397)
GFR AFRICAN AMERICAN: >59
GFR NON-AFRICAN AMERICAN: >60
GLOBULIN: 3.5 G/DL
GLUCOSE BLD-MCNC: 121 MG/DL (ref 74–106)
HCT VFR BLD CALC: 37.2 % (ref 37–47)
HEMOGLOBIN: 11.4 G/DL (ref 11.5–16.5)
INR BLD: 1.39 (ref 0.87–1.14)
MAGNESIUM: 2 MG/DL (ref 1.7–2.4)
MCH RBC QN AUTO: 26.1 PG (ref 27–32)
MCHC RBC AUTO-ENTMCNC: 30.6 G/DL (ref 31–35)
MCV RBC AUTO: 85.3 FL (ref 80–100)
PDW BLD-RTO: 13.3 % (ref 11–16)
PLATELET # BLD: 279 K/UL (ref 150–400)
PMV BLD AUTO: 11 FL (ref 6–10)
POTASSIUM REFLEX MAGNESIUM: 3.1 MMOL/L (ref 3.4–5.1)
PROTHROMBIN TIME: 17.1 SEC (ref 11.7–14.6)
RBC # BLD: 4.36 M/UL (ref 3.8–5.8)
REASON FOR REJECTION: NORMAL
REJECTED TEST: NORMAL
SODIUM BLD-SCNC: 132 MMOL/L (ref 136–145)
TOTAL PROTEIN: 6.9 G/DL (ref 6.4–8.3)
WBC # BLD: 8.4 K/UL (ref 4–11)

## 2020-10-14 PROCEDURE — 6370000000 HC RX 637 (ALT 250 FOR IP): Performed by: INTERNAL MEDICINE

## 2020-10-14 PROCEDURE — 6370000000 HC RX 637 (ALT 250 FOR IP): Performed by: PHYSICIAN ASSISTANT

## 2020-10-14 PROCEDURE — 2700000000 HC OXYGEN THERAPY PER DAY

## 2020-10-14 PROCEDURE — 83735 ASSAY OF MAGNESIUM: CPT

## 2020-10-14 PROCEDURE — 6360000002 HC RX W HCPCS: Performed by: INTERNAL MEDICINE

## 2020-10-14 PROCEDURE — 2500000003 HC RX 250 WO HCPCS: Performed by: PHYSICIAN ASSISTANT

## 2020-10-14 PROCEDURE — 36415 COLL VENOUS BLD VENIPUNCTURE: CPT

## 2020-10-14 PROCEDURE — 80053 COMPREHEN METABOLIC PANEL: CPT

## 2020-10-14 PROCEDURE — 85379 FIBRIN DEGRADATION QUANT: CPT

## 2020-10-14 PROCEDURE — 99232 SBSQ HOSP IP/OBS MODERATE 35: CPT | Performed by: INTERNAL MEDICINE

## 2020-10-14 PROCEDURE — 85384 FIBRINOGEN ACTIVITY: CPT

## 2020-10-14 PROCEDURE — 2580000003 HC RX 258: Performed by: INTERNAL MEDICINE

## 2020-10-14 PROCEDURE — 1200000000 HC SEMI PRIVATE

## 2020-10-14 PROCEDURE — 2580000003 HC RX 258: Performed by: PHYSICIAN ASSISTANT

## 2020-10-14 PROCEDURE — 85610 PROTHROMBIN TIME: CPT

## 2020-10-14 PROCEDURE — 6360000002 HC RX W HCPCS: Performed by: PHYSICIAN ASSISTANT

## 2020-10-14 PROCEDURE — 85027 COMPLETE CBC AUTOMATED: CPT

## 2020-10-14 RX ORDER — PANTOPRAZOLE SODIUM 40 MG/1
40 TABLET, DELAYED RELEASE ORAL
Status: DISCONTINUED | OUTPATIENT
Start: 2020-10-14 | End: 2020-10-16 | Stop reason: HOSPADM

## 2020-10-14 RX ORDER — SODIUM CHLORIDE 9 MG/ML
10 INJECTION INTRAVENOUS DAILY
Status: DISCONTINUED | OUTPATIENT
Start: 2020-10-14 | End: 2020-10-14 | Stop reason: ALTCHOICE

## 2020-10-14 RX ORDER — PANTOPRAZOLE SODIUM 40 MG/10ML
40 INJECTION, POWDER, LYOPHILIZED, FOR SOLUTION INTRAVENOUS 2 TIMES DAILY
Status: DISCONTINUED | OUTPATIENT
Start: 2020-10-15 | End: 2020-10-14

## 2020-10-14 RX ADMIN — FERROUS SULFATE TAB EC 324 MG (65 MG FE EQUIVALENT) 324 MG: 324 (65 FE) TABLET DELAYED RESPONSE at 16:41

## 2020-10-14 RX ADMIN — SUCRALFATE 1 G: 1 TABLET ORAL at 16:41

## 2020-10-14 RX ADMIN — PANTOPRAZOLE SODIUM 40 MG: 40 TABLET, DELAYED RELEASE ORAL at 16:41

## 2020-10-14 RX ADMIN — MULTIPLE VITAMINS W/ MINERALS TAB 1 TABLET: TAB at 10:21

## 2020-10-14 RX ADMIN — NIFEDIPINE 60 MG: 60 TABLET, FILM COATED, EXTENDED RELEASE ORAL at 10:21

## 2020-10-14 RX ADMIN — METRONIDAZOLE 500 MG: 500 TABLET ORAL at 02:19

## 2020-10-14 RX ADMIN — Medication 10 ML: at 21:02

## 2020-10-14 RX ADMIN — ZINC SULFATE 220 MG (50 MG) CAPSULE 50 MG: CAPSULE at 10:21

## 2020-10-14 RX ADMIN — FERROUS SULFATE TAB EC 324 MG (65 MG FE EQUIVALENT) 324 MG: 324 (65 FE) TABLET DELAYED RESPONSE at 10:22

## 2020-10-14 RX ADMIN — POLYETHYLENE GLYCOL 3350 17 G: 17 POWDER, FOR SOLUTION ORAL at 10:23

## 2020-10-14 RX ADMIN — TOPIRAMATE 25 MG: 25 TABLET, FILM COATED ORAL at 10:20

## 2020-10-14 RX ADMIN — MORPHINE SULFATE 15 MG: 15 TABLET, FILM COATED, EXTENDED RELEASE ORAL at 20:59

## 2020-10-14 RX ADMIN — METRONIDAZOLE 500 MG: 500 TABLET ORAL at 10:22

## 2020-10-14 RX ADMIN — ATORVASTATIN CALCIUM 10 MG: 10 TABLET, FILM COATED ORAL at 20:58

## 2020-10-14 RX ADMIN — MAGNESIUM OXIDE TAB 400 MG (241.3 MG ELEMENTAL MG) 400 MG: 400 (241.3 MG) TAB at 10:20

## 2020-10-14 RX ADMIN — CALCIUM POLYCARBOPHIL 625 MG TABLET 625 MG: at 10:20

## 2020-10-14 RX ADMIN — MORPHINE SULFATE 15 MG: 15 TABLET, FILM COATED, EXTENDED RELEASE ORAL at 10:21

## 2020-10-14 RX ADMIN — TOPIRAMATE 25 MG: 25 TABLET, FILM COATED ORAL at 20:58

## 2020-10-14 RX ADMIN — SUCRALFATE 1 G: 1 TABLET ORAL at 20:58

## 2020-10-14 RX ADMIN — AZITHROMYCIN DIHYDRATE 500 MG: 500 INJECTION, POWDER, LYOPHILIZED, FOR SOLUTION INTRAVENOUS at 10:28

## 2020-10-14 RX ADMIN — SENNOSIDES 17.2 MG: 8.6 TABLET, FILM COATED ORAL at 20:58

## 2020-10-14 RX ADMIN — OXYCODONE HYDROCHLORIDE AND ACETAMINOPHEN 500 MG: 500 TABLET ORAL at 10:20

## 2020-10-14 RX ADMIN — Medication 10 ML: at 10:23

## 2020-10-14 RX ADMIN — Medication 1000 UNITS: at 10:21

## 2020-10-14 RX ADMIN — DOCUSATE SODIUM 100 MG: 100 CAPSULE, LIQUID FILLED ORAL at 10:22

## 2020-10-14 RX ADMIN — REMDESIVIR 100 MG: 100 INJECTION, POWDER, LYOPHILIZED, FOR SOLUTION INTRAVENOUS at 12:41

## 2020-10-14 RX ADMIN — VENLAFAXINE 150 MG: 75 TABLET ORAL at 10:20

## 2020-10-14 RX ADMIN — SENNOSIDES 17.2 MG: 8.6 TABLET, FILM COATED ORAL at 10:20

## 2020-10-14 RX ADMIN — CALCIUM POLYCARBOPHIL 625 MG TABLET 625 MG: at 20:58

## 2020-10-14 RX ADMIN — SUCRALFATE 1 G: 1 TABLET ORAL at 10:20

## 2020-10-14 RX ADMIN — CEFTRIAXONE 1 G: 1 INJECTION, POWDER, FOR SOLUTION INTRAMUSCULAR; INTRAVENOUS at 22:19

## 2020-10-14 RX ADMIN — DONEPEZIL HYDROCHLORIDE 10 MG: 5 TABLET, FILM COATED ORAL at 20:58

## 2020-10-14 RX ADMIN — DEXAMETHASONE 6 MG: 4 TABLET ORAL at 10:20

## 2020-10-14 RX ADMIN — CEFTRIAXONE 1 G: 1 INJECTION, POWDER, FOR SOLUTION INTRAMUSCULAR; INTRAVENOUS at 02:18

## 2020-10-14 RX ADMIN — METRONIDAZOLE 500 MG: 500 TABLET ORAL at 16:41

## 2020-10-14 ASSESSMENT — PAIN DESCRIPTION - LOCATION: LOCATION: HIP

## 2020-10-14 ASSESSMENT — PAIN SCALES - GENERAL
PAINLEVEL_OUTOF10: 0
PAINLEVEL_OUTOF10: 7
PAINLEVEL_OUTOF10: 9

## 2020-10-14 ASSESSMENT — PAIN DESCRIPTION - PAIN TYPE: TYPE: ACUTE PAIN;CHRONIC PAIN

## 2020-10-14 NOTE — PROGRESS NOTES
Patient was taken off NRB Mask @ 15 LPM  and placed on HFNC @ 12 LPM.Patient Sp02 98%. Will continue to wean oxygen as tolerated.

## 2020-10-14 NOTE — PLAN OF CARE
Problem: Airway Clearance - Ineffective  Goal: Achieve or maintain patent airway  Outcome: Ongoing     Problem: Gas Exchange - Impaired  Goal: Absence of hypoxia  10/14/2020 1121 by Kimani Person RN  Outcome: Ongoing  10/14/2020 0138 by Angelique Portillo RN  Outcome: Ongoing     Problem: Breathing Pattern - Ineffective  Goal: Ability to achieve and maintain a regular respiratory rate  10/14/2020 0138 by Angelique Portillo RN  Outcome: Ongoing     Problem: Risk for Fluid Volume Deficit  Goal: Maintain normal heart rhythm  Outcome: Ongoing     Problem: Skin Integrity:  Goal: Absence of new skin breakdown  Description: Absence of new skin breakdown  Outcome: Ongoing

## 2020-10-14 NOTE — PROGRESS NOTES
Progress Note      Subjective:   Chief complaint:   Hematemesis    Interval History:   Patient seen and examined this afternoon. On 12 L high flow NC today. At time of exam, patient lying in bed with no acute complaints. She denies any shortness of breath. Called patient's , Maria C Underwood, and provided update on clinical condition. Review of systems:     Constitutional:  Denies fever or chills. Positive for generalized weakness and chronic fatigue. Eyes:  Denies change in visual acuity or discharge. HENT:  Denies nasal congestion or sore throat. Respiratory:  Denies cough or shortness of breath. Cardiovascular:  Denies chest pain, palpitation or swelling in LEs. GI:  Denies abdominal pain, nausea, vomiting, bloody stools or diarrhea. :  Denies dysuria or frequency. Musculoskeletal:  Denies back pain or joint pain. Integument:  Denies rash or itching. Neurologic:  Denies headache, focal weakness or sensory changes. Endocrine:  Denies polyuria or polydipsia. Lymphatic:  Denies swollen glands or night sweats. Psychiatric:  Denies depression or anxiety. Past medical history, surgical history, family history and social history reviewed and unchanged compared to H&P earlier this admission.     Medications:   Scheduled Meds:   [START ON 10/15/2020] pantoprazole  40 mg Intravenous BID    And    sodium chloride (PF)  10 mL Intravenous Daily    vitamin D3  1,000 Units Oral Daily    cefTRIAXone (ROCEPHIN) IV  1 g Intravenous Q24H    metroNIDAZOLE  500 mg Oral Q8H    magnesium oxide  400 mg Oral Daily    zinc sulfate  50 mg Oral Daily    ascorbic acid  500 mg Oral Daily    bisacodyl  10 mg Rectal Daily    donepezil  10 mg Oral Nightly    morphine  15 mg Oral BID    therapeutic multivitamin-minerals  1 tablet Oral Daily    polycarbophil  625 mg Oral BID    senna  2 tablet Oral BID    sucralfate  1 g Oral 4x Daily    atorvastatin  10 mg Oral Daily    topiramate  25 mg Oral BID    CREATININE <0.5 10/14/2020    GLUCOSE 121 10/14/2020    CALCIUM 8.8 10/14/2020        Assessment and Plan: Active Hospital Problems     Diagnosis Date Noted    GI bleed [K92.2]  -Present on admission with hematemesis.  Hemoccult positive.  History of iron deficiency anemia requiring recurrent transfusions.  Per chart review and family report, patient has had multiple upper and lower endoscopies that failed to identify any source of bleeding.  Family and patient refused any further endoscopies. -Hemoglobin 13.5 on arrival.  Down to 10.5 on 10/11 and 9.3 on 10/12.  Improved to 11.1 on 10/13 and 11.4 on 10/14.  -Continue PPI (change to oral) BID and Carafate  -Monitor and transfuse for hemoglobin less than 7    10/11/2020    COVID-19 [U07.1]  -COVID-19 +10/11  -Patient is a longstanding resident of [address] [place] [address]  -Started on magnesium, vitamin C, zinc, remdesevir, dexamethasone, Rocephin and azithromycin  -Holding therapeutic anticoagulation secondary to GI bleed.  Patient and her  made aware it is contraindicated in her current state and both expressed understanding.  -Continue supplemental oxygen; patiently currently requiring 12 L high flow NC; has desaturations with removal of oxygen  -Patient agreeable for BiPAP but refuses intubation.  CODE STATUS DNR/DNI.  -Droplet plus precautions    10/11/2020    Pneumonia of left lower lobe due to infectious organism [J18.9]  -Secondary to COVID-19  -Chest x-ray 10/11 with left basilar airspace disease  -Continue treatment with Rocephin and azithromycin  -Continue treatment for COVID-19 as outlined above  -Continue supplemental oxygen    10/11/2020    Colitis [K52.9]  -Patient with chronic fecal impaction and suspected stercoral colitis on previous CT abdomen pelvis.  Evidence of large stool burden as far back as May 2018 per chart review.  -Per CT abdomen/pelvis July 2019 patient with abnormally enlarged mesorectal lymph nodes and presence of rectal neoplasm could not be ruled out.  Patient and her  have declined further work-up for this.  -On Flagyl and Rocephin for possible colitis  -Continue bowel regimen with daily suppositories and oral laxatives, stool softeners    10/11/2020    Hematemesis [K92.0]  -Now resolved  -Patient and her  have declined further endoscopies    10/11/2020    Elevated BUN [R79.9]  -Suspect related to GI bleed and possibly in part due to dehydration  -BUN wnl with resolution of GI bleed and IV fluids    10/11/2020    Benign essential HTN [I10]  -Continue Procardia    10/11/2020    History of dementia [Z86.59]  -Mild.  Continue Aricept.  Patient currently alert and oriented x3.         Other constipation [K59.09]  -Continue bowel regimen         Fecal impaction in rectum ([address] [K56.41]  -Longstanding issue per chart review  -Continue bowel regimen    07/03/2019    Heme positive stool [R19.5]  -See under anemia         Anemia [D64.9]  -Longstanding history of iron deficiency anemia requiring transfusions  -EGD and colonoscopy in July 2019 with Dr. Leora Ma with no source of bleeding identified at that time.  Admitted at [facility] for the same in October 2019 and underwent capsule endoscopy and multiple colonoscopies without evidence of bleeding.  Patient has been reports biopsies were negative for malignancy at [facility].   -Patient and her  have declined any further endoscopies  -Hemoglobin 13.5 on arrival.  Down drifted to 10.5 on 10/11 after receiving IV fluids and 9.3 today.  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 [facility] requires assistance with all ADLs including eating and drinking.  Continue frequent oral care. -CODE STATUS DNR/DNI per patient request          Patient was seen and examined by Dr. Angeli Cazares and plan of care reviewed.       Electronically signed by FLASH Blunt on 10/14/2020 at 2:28 PM

## 2020-10-14 NOTE — PROGRESS NOTES
Nutrition Note    Today is day 4 for patient to be on Clear Liquids. To help reduce risk of malnutrition recommend to advance diet as tolerated. If diet cannot be advanced in next 24 hours may need to consider alternative nutrition support. Clear Liquid Supplement has been added to all meals.     Electronically signed by Henri Rose on 10/14/20 at 10:44 AM EDT

## 2020-10-14 NOTE — FLOWSHEET NOTE
10/14/20 1015   Assessment   Charting Type Shift assessment   Neurological   Neuro (WDL) X   Level of Consciousness 0   [place] Coma Scale   Eye Opening 4   Best Verbal Response 4   Best Motor Response 6   [place] Coma Scale Score 14   NIH/MNHISS Stroke Scale   NIH/MNIHSS Stroke Scale Assessed No   HEENT   HEENT (WDL) X   Teeth Missing teeth   Respiratory   Respiratory (WDL) X   Respiratory Pattern Regular   Respiratory Depth Normal   Respiratory Quality/Effort Unlabored   Chest Assessment Chest expansion symmetrical   Breath Sounds   Right Upper Lobe Rhonchi   Right Middle Lobe Rhonchi   Right Lower Lobe Diminished   Left Upper Lobe Rhonchi   Left Lower Lobe Diminished   Cardiac   Cardiac (WDL) X   Gastrointestinal   Abdominal (WDL) X   Abdomen Inspection Distended   Tenderness No guarding   RUQ Bowel Sounds Active   LUQ Bowel Sounds Active   RLQ Bowel Sounds Active   LLQ Bowel Sounds Active   Peripheral Vascular   Peripheral Vascular (WDL) WDL   Edema None   Skin Color/Condition   Skin Color/Condition (WDL) WDL   Skin Integrity   Skin Integrity (WDL) X   Skin Integrity Redness   Location left hip   Preventative Dressing Yes   Date Applied 10/12/20   Assessed this shift? Yes  (CDI)   Musculoskeletal   Musculoskeletal (WDL) X   RL Extremity Contracture   LL Extremity Contracture   Genitourinary   Genitourinary (WDL) X   Psychosocial   Psychosocial (WDL) WDL   Pt resting in bed. Pt awoken by voice and touch. Pt alert to person and year. Pt disoriented to place and month. Pt appears in no acute distress. Pt currently on 12 L high flow NC. Pt currently on telemetry monitoring showing NSR. Pt lung sounds rhonchi with diminished sounds in bases cassy. Pt encouraged to cough and deep breathe. Pt placed on right side by pillow support. Pt mepilex CDI on hip. Pt refused suppository. Pt call bell and bedside table within reach. Will continue to monitor pt.

## 2020-10-15 PROBLEM — R79.89 ELEVATED LFTS: Status: ACTIVE | Noted: 2020-10-15

## 2020-10-15 LAB
A/G RATIO: 0.9 (ref 0.8–2)
ALBUMIN SERPL-MCNC: 3 G/DL (ref 3.4–4.8)
ALP BLD-CCNC: 538 U/L (ref 25–100)
ALT SERPL-CCNC: 73 U/L (ref 4–36)
ANION GAP SERPL CALCULATED.3IONS-SCNC: 10 MMOL/L (ref 3–16)
AST SERPL-CCNC: 143 U/L (ref 8–33)
BILIRUB SERPL-MCNC: <0.2 MG/DL (ref 0.3–1.2)
BLOOD CULTURE, ROUTINE: NORMAL
BUN BLDV-MCNC: 17 MG/DL (ref 6–20)
CALCIUM SERPL-MCNC: 8.4 MG/DL (ref 8.5–10.5)
CHLORIDE BLD-SCNC: 98 MMOL/L (ref 98–107)
CO2: 25 MMOL/L (ref 20–30)
CREAT SERPL-MCNC: <0.5 MG/DL (ref 0.4–1.2)
CULTURE, BLOOD 2: NORMAL
D DIMER: 0.54 UG/ML FEU (ref 0–0.6)
FIBRINOGEN: 512 MG/DL (ref 200–397)
GFR AFRICAN AMERICAN: >59
GFR NON-AFRICAN AMERICAN: >60
GLOBULIN: 3.2 G/DL
GLUCOSE BLD-MCNC: 159 MG/DL (ref 74–106)
HCT VFR BLD CALC: 33.2 % (ref 37–47)
HEMOGLOBIN: 10.4 G/DL (ref 11.5–16.5)
INR BLD: 1.48 (ref 0.87–1.14)
MAGNESIUM: 1.7 MG/DL (ref 1.7–2.4)
MCH RBC QN AUTO: 26.5 PG (ref 27–32)
MCHC RBC AUTO-ENTMCNC: 31.3 G/DL (ref 31–35)
MCV RBC AUTO: 84.5 FL (ref 80–100)
PDW BLD-RTO: 13.1 % (ref 11–16)
PLATELET # BLD: 297 K/UL (ref 150–400)
PMV BLD AUTO: 10.5 FL (ref 6–10)
POTASSIUM REFLEX MAGNESIUM: 3.4 MMOL/L (ref 3.4–5.1)
PROTHROMBIN TIME: 18 SEC (ref 11.7–14.6)
RBC # BLD: 3.93 M/UL (ref 3.8–5.8)
SODIUM BLD-SCNC: 133 MMOL/L (ref 136–145)
TOTAL CK: 42 U/L (ref 26–174)
TOTAL PROTEIN: 6.2 G/DL (ref 6.4–8.3)
TSH SERPL DL<=0.05 MIU/L-ACNC: 1.42 UIU/ML (ref 0.35–5.5)
WBC # BLD: 5.8 K/UL (ref 4–11)

## 2020-10-15 PROCEDURE — 85379 FIBRIN DEGRADATION QUANT: CPT

## 2020-10-15 PROCEDURE — 6370000000 HC RX 637 (ALT 250 FOR IP): Performed by: PHYSICIAN ASSISTANT

## 2020-10-15 PROCEDURE — 85610 PROTHROMBIN TIME: CPT

## 2020-10-15 PROCEDURE — 2700000000 HC OXYGEN THERAPY PER DAY

## 2020-10-15 PROCEDURE — 36415 COLL VENOUS BLD VENIPUNCTURE: CPT

## 2020-10-15 PROCEDURE — 99232 SBSQ HOSP IP/OBS MODERATE 35: CPT | Performed by: INTERNAL MEDICINE

## 2020-10-15 PROCEDURE — 84443 ASSAY THYROID STIM HORMONE: CPT

## 2020-10-15 PROCEDURE — 85384 FIBRINOGEN ACTIVITY: CPT

## 2020-10-15 PROCEDURE — 2580000003 HC RX 258: Performed by: INTERNAL MEDICINE

## 2020-10-15 PROCEDURE — 97802 MEDICAL NUTRITION INDIV IN: CPT

## 2020-10-15 PROCEDURE — 6360000002 HC RX W HCPCS: Performed by: INTERNAL MEDICINE

## 2020-10-15 PROCEDURE — 6360000002 HC RX W HCPCS: Performed by: PHYSICIAN ASSISTANT

## 2020-10-15 PROCEDURE — 2580000003 HC RX 258: Performed by: PHYSICIAN ASSISTANT

## 2020-10-15 PROCEDURE — 6370000000 HC RX 637 (ALT 250 FOR IP): Performed by: INTERNAL MEDICINE

## 2020-10-15 PROCEDURE — 80053 COMPREHEN METABOLIC PANEL: CPT

## 2020-10-15 PROCEDURE — 2500000003 HC RX 250 WO HCPCS: Performed by: PHYSICIAN ASSISTANT

## 2020-10-15 PROCEDURE — 85027 COMPLETE CBC AUTOMATED: CPT

## 2020-10-15 PROCEDURE — 83735 ASSAY OF MAGNESIUM: CPT

## 2020-10-15 PROCEDURE — 82550 ASSAY OF CK (CPK): CPT

## 2020-10-15 PROCEDURE — 1200000000 HC SEMI PRIVATE

## 2020-10-15 RX ADMIN — TOPIRAMATE 25 MG: 25 TABLET, FILM COATED ORAL at 09:39

## 2020-10-15 RX ADMIN — METRONIDAZOLE 500 MG: 500 TABLET ORAL at 17:59

## 2020-10-15 RX ADMIN — SENNOSIDES 17.2 MG: 8.6 TABLET, FILM COATED ORAL at 22:54

## 2020-10-15 RX ADMIN — TOPIRAMATE 25 MG: 25 TABLET, FILM COATED ORAL at 22:54

## 2020-10-15 RX ADMIN — VENLAFAXINE 150 MG: 75 TABLET ORAL at 09:39

## 2020-10-15 RX ADMIN — CALCIUM POLYCARBOPHIL 625 MG TABLET 625 MG: at 22:55

## 2020-10-15 RX ADMIN — ZINC SULFATE 220 MG (50 MG) CAPSULE 50 MG: CAPSULE at 09:39

## 2020-10-15 RX ADMIN — SUCRALFATE 1 G: 1 TABLET ORAL at 18:01

## 2020-10-15 RX ADMIN — AZITHROMYCIN DIHYDRATE 500 MG: 500 INJECTION, POWDER, LYOPHILIZED, FOR SOLUTION INTRAVENOUS at 09:41

## 2020-10-15 RX ADMIN — SUCRALFATE 1 G: 1 TABLET ORAL at 22:54

## 2020-10-15 RX ADMIN — FERROUS SULFATE TAB EC 324 MG (65 MG FE EQUIVALENT) 324 MG: 324 (65 FE) TABLET DELAYED RESPONSE at 13:34

## 2020-10-15 RX ADMIN — MULTIPLE VITAMINS W/ MINERALS TAB 1 TABLET: TAB at 09:39

## 2020-10-15 RX ADMIN — CALCIUM POLYCARBOPHIL 625 MG TABLET 625 MG: at 09:39

## 2020-10-15 RX ADMIN — MORPHINE SULFATE 15 MG: 15 TABLET, FILM COATED, EXTENDED RELEASE ORAL at 09:39

## 2020-10-15 RX ADMIN — FERROUS SULFATE TAB EC 324 MG (65 MG FE EQUIVALENT) 324 MG: 324 (65 FE) TABLET DELAYED RESPONSE at 09:40

## 2020-10-15 RX ADMIN — REMDESIVIR 100 MG: 100 INJECTION, POWDER, LYOPHILIZED, FOR SOLUTION INTRAVENOUS at 13:34

## 2020-10-15 RX ADMIN — SUCRALFATE 1 G: 1 TABLET ORAL at 09:39

## 2020-10-15 RX ADMIN — DEXAMETHASONE 6 MG: 4 TABLET ORAL at 09:39

## 2020-10-15 RX ADMIN — MORPHINE SULFATE 15 MG: 15 TABLET, FILM COATED, EXTENDED RELEASE ORAL at 22:55

## 2020-10-15 RX ADMIN — FERROUS SULFATE TAB EC 324 MG (65 MG FE EQUIVALENT) 324 MG: 324 (65 FE) TABLET DELAYED RESPONSE at 17:59

## 2020-10-15 RX ADMIN — PANTOPRAZOLE SODIUM 40 MG: 40 TABLET, DELAYED RELEASE ORAL at 17:59

## 2020-10-15 RX ADMIN — METRONIDAZOLE 500 MG: 500 TABLET ORAL at 09:39

## 2020-10-15 RX ADMIN — DONEPEZIL HYDROCHLORIDE 10 MG: 5 TABLET, FILM COATED ORAL at 22:55

## 2020-10-15 RX ADMIN — CEFTRIAXONE 1 G: 1 INJECTION, POWDER, FOR SOLUTION INTRAMUSCULAR; INTRAVENOUS at 22:54

## 2020-10-15 RX ADMIN — OXYCODONE HYDROCHLORIDE AND ACETAMINOPHEN 500 MG: 500 TABLET ORAL at 09:39

## 2020-10-15 RX ADMIN — SUCRALFATE 1 G: 1 TABLET ORAL at 13:34

## 2020-10-15 RX ADMIN — METRONIDAZOLE 500 MG: 500 TABLET ORAL at 00:19

## 2020-10-15 RX ADMIN — PANTOPRAZOLE SODIUM 40 MG: 40 TABLET, DELAYED RELEASE ORAL at 06:31

## 2020-10-15 RX ADMIN — Medication 1000 UNITS: at 09:39

## 2020-10-15 RX ADMIN — MAGNESIUM OXIDE TAB 400 MG (241.3 MG ELEMENTAL MG) 400 MG: 400 (241.3 MG) TAB at 09:39

## 2020-10-15 RX ADMIN — NIFEDIPINE 60 MG: 60 TABLET, FILM COATED, EXTENDED RELEASE ORAL at 09:39

## 2020-10-15 ASSESSMENT — PAIN SCALES - GENERAL
PAINLEVEL_OUTOF10: 5
PAINLEVEL_OUTOF10: 0
PAINLEVEL_OUTOF10: 5
PAINLEVEL_OUTOF10: 0

## 2020-10-15 NOTE — PLAN OF CARE
Problem:  Body Temperature -  Risk of, Imbalanced  Goal: Ability to maintain a body temperature within defined limits  Outcome: Met This Shift  Goal: Will regain or maintain usual level of consciousness  Outcome: Met This Shift  Goal: Complications related to the disease process, condition or treatment will be avoided or minimized  Outcome: Met This Shift     Problem: Isolation Precautions - Risk of Spread of Infection  Goal: Prevent transmission of infection  Outcome: Met This Shift     Problem: Risk for Fluid Volume Deficit  Goal: Maintain normal heart rhythm  Outcome: Met This Shift  Goal: Maintain absence of muscle cramping  Outcome: Met This Shift  Goal: Maintain normal serum potassium, sodium, calcium, phosphorus, and pH  Outcome: Met This Shift     Problem: Skin Integrity:  Goal: Absence of new skin breakdown  Description: Absence of new skin breakdown  Outcome: Met This Shift     Problem: Airway Clearance - Ineffective  Goal: Achieve or maintain patent airway  10/15/2020 1104 by Tc Ford RN  Outcome: Ongoing  10/14/2020 2159 by Pan Moser RN  Outcome: Ongoing     Problem: Gas Exchange - Impaired  Goal: Absence of hypoxia  10/15/2020 1104 by Tc Ford RN  Outcome: Ongoing  10/14/2020 2159 by Pan Moser RN  Outcome: Ongoing  Goal: Promote optimal lung function  Outcome: Ongoing     Problem: Breathing Pattern - Ineffective  Goal: Ability to achieve and maintain a regular respiratory rate  Outcome: Ongoing     Problem: Nutrition Deficits  Goal: Optimize nutrtional status  Outcome: Ongoing     Problem: Loneliness or Risk for Loneliness  Goal: Demonstrate positive use of time alone when socialization is not possible  Outcome: Ongoing     Problem: Fatigue  Goal: Verbalize increase energy and improved vitality  Outcome: Ongoing     Problem: Patient Education: Go to Patient Education Activity  Goal: Patient/Family Education  Outcome: Ongoing     Problem: Skin Integrity:  Goal: Will show no infection signs and symptoms  Description: Will show no infection signs and symptoms  Outcome: Ongoing

## 2020-10-15 NOTE — PROGRESS NOTES
Comprehensive Nutrition Assessment    Type and Reason for Visit:  NPO/Clear Liquid    Nutrition Recommendations/Plan: Pt now on Pureed diet and will monitor intakes of meals and ONS. Recommend advancing diet as tolerated and MD allowed. Nutrition Assessment:  Pt is NPO/clear liquids x 5 today. Pt has ONS in place. Will recommend diet advancement and if cannot be tolerated recommend considering alternative nutrition support. Malnutrition Assessment:  Malnutrition Status: At risk for malnutrition (Comment)    Context:  Acute Illness     Findings of the 6 clinical characteristics of malnutrition:  Energy Intake:  7 - 50% or less of estimated energy requirements for 5 or more days  Weight Loss:  No significant weight loss     Body Fat Loss:  Unable to assess     Muscle Mass Loss:  Unable to assess    Fluid Accumulation:  No significant fluid accumulation     Strength:  Not Performed    Estimated Daily Nutrient Needs:  Energy (kcal):  6999-8698(48-79 kcal/kg abw); Weight Used for Energy Requirements:  Current     Protein (g):  62-67(1.2-1.3 gm/kg abw); Weight Used for Protein Requirements:  Current        Fluid (ml/day):  6772-6444 (1 ml/kcal); Weight Used for Fluid Requirements:  Current      Nutrition Related Findings:  Pt is COVID-19 pt and has been Clear liquids 5 days. Diet advanced today to pureed foods. Pt has redness noted on left hip and contracted. Has missing teeth. Wounds:  None       Current Nutrition Therapies:    Dietary Nutrition Supplements: Clear Liquid Oral Supplement  DIET DYSPHAGIA PUREED;     Anthropometric Measures:  · Height: 5' 3\" (160 cm)  · Current Body Weight: 114 lb 6.7 oz (51.9 kg)   · Admission Body Weight: 114 lb 12.8 oz (52.1 kg)    · Usual Body Weight:       · Ideal Body Weight: 115 lbs; % Ideal Body Weight 99.5 %   · BMI: 20.3  · Adjusted Body Weight:  ; No Adjustment   · Adjusted BMI:      · BMI Categories: Underweight (BMI less than 22) age over 72 Nutrition Diagnosis:   · Inadequate protein-energy intake related to altered GI function as evidenced by NPO or clear liquid status due to medical condition      Nutrition Interventions:   Food and/or Nutrient Delivery:  Continue Current Diet, Continue Oral Nutrition Supplement  Nutrition Education/Counseling:  No recommendation at this time   Coordination of Nutrition Care:  No recommendation at this time    Goals:  to meet estimated needs for age/condition. Pt to eat 50%-75% of meals.        Nutrition Monitoring and Evaluation:   Behavioral-Environmental Outcomes:      Food/Nutrient Intake Outcomes:  Food and Nutrient Intake, Supplement Intake  Physical Signs/Symptoms Outcomes:  Biochemical Data, GI Status, Skin, Weight     Discharge Planning:    Continue Oral Nutrition Supplement, Too soon to determine     Electronically signed by Davon Freitas on 10/15/20 at 1:44 PM EDT

## 2020-10-15 NOTE — FLOWSHEET NOTE
10/14/20 2054   Vital Signs   Temp 97.7 °F (36.5 °C)   Temp Source Temporal   Pulse 96   Heart Rate Source Monitor   Resp 20   BP (!) 124/58   BP Location Right upper arm   BP Upper/Lower Upper   Oxygen Therapy   SpO2 95 %   O2 Device High flow nasal cannula   O2 Flow Rate (L/min) 3 L/min

## 2020-10-15 NOTE — ADT AUTH CERT
Utilization Reviews         [facility], Acute - Care Day 5 (10/15/2020) by Ashley Moreno RN         Review Status  Review Entered    Completed  10/15/2020 15:01        Criteria Review       Care Day: 5 Care Date: 10/15/2020 Level of Care:    Guideline Day 2    Level Of Care    (X) Floor    Clinical Status    ( ) * Hypotension absent    ( ) * No requirement for mechanical ventilation    ( ) * Oxygenation at baseline or improved    ( ) * Mental status at baseline    Routes    ( ) * Oral hydration    * Milestone    Additional Notes    CARE DAY 5        10/15/2020          Subjective:    Chief complaint:    Hematemesis         Interval History:    Patient seen and examined today.  Down from 12 L high flow nasal cannula to 3 L nasal cannula and doing well.  Patient also tolerating liquids and medications in applesauce this morning.  Diet advanced to puréed which she is also tolerating.  No complaints.         Review of systems:         Constitutional:  Denies fever or chills.  Positive for generalized weakness and chronic fatigue. Eyes:  Denies change in visual acuity or discharge.  Positive for blindness. HENT:  Denies nasal congestion or sore throat. Respiratory:  Denies cough or shortness of breath. Cardiovascular:  Denies chest pain, palpitation or swelling in LEs. GI:  Denies abdominal pain, nausea, vomiting, bloody stools or diarrhea. :  Denies dysuria or frequency. Musculoskeletal:  Denies back pain or joint pain. Integument:  Denies rash or itching. Neurologic:  Denies headache, focal weakness or sensory changes. Endocrine:  Denies polyuria or polydipsia. Lymphatic:  Denies swollen glands or night sweats.     Psychiatric:  Denies depression or anxiety.         Past medical history, surgical history, family history and social history reviewed and unchanged compared to H&P earlier this admission.         Medications:    Scheduled Meds:    Scheduled Medications    · pantoprazole 40 mg Oral BID AC    · vitamin D3 1,000 Units Oral Daily    · cefTRIAXone (ROCEPHIN) IV 1 g Intravenous Q24H    · metroNIDAZOLE 500 mg Oral Q8H    · magnesium oxide 400 mg Oral Daily    · zinc sulfate 50 mg Oral Daily    · ascorbic acid 500 mg Oral Daily    · bisacodyl 10 mg Rectal Daily    · donepezil 10 mg Oral Nightly    · morphine 15 mg Oral BID    · therapeutic multivitamin-minerals 1 tablet Oral Daily    · polycarbophil 625 mg Oral BID    · senna 2 tablet Oral BID    · sucralfate 1 g Oral 4x Daily    · atorvastatin 10 mg Oral Daily    · topiramate 25 mg Oral BID    · venlafaxine 150 mg Oral Daily    · sodium chloride flush 10 mL Intravenous 2 times per day    · docusate sodium 100 mg Oral Daily    · polyethylene glycol 17 g Oral Daily    · azithromycin 500 mg Intravenous Q24H    · ferrous sulfate 324 mg Oral TID WC    · dexamethasone 6 mg Oral Daily    · NIFEdipine 60 mg Oral Daily          Objective:         Vital Signs    Temp: 97.7 °F (36.5 °C)    Pulse: 92    Resp: 20    BP: 130/64    SpO2: 96 %    O2 Device: High flow nasal cannula    O2 Flow Rate (L/min): 3 L/min         Vital signs reviewed in electronic charts.         Physical exam         Constitutional:  Well developed, thin and frail elderly lady lying in bed in no acute distress on 3 L nasal cannula. Eyes: Blind in both eyes.  Keeps eyelids closed most of the time. HENT:  Atraumatic, external ears normal, external nose/nares normal, oropharynx moist, no pharyngeal exudates. Neck:  Supple. No JVD or thyromegaly. Respiratory:  No respiratory distress, normal breath sounds, no rales, no wheezing.  On 3 L nasal cannula. Cardiovascular:  Normal rate, normal rhythm, no murmurs, no gallops, no rubs. GI:  Soft, nondistended, normal bowel sounds, nontender, no organomegaly, no mass. :  No costovertebral angle tenderness. Musculoskeletal:  No edema, no tenderness, no obvious deformities. Patient is moving all extremities. Integument:  Well hydrated, no rash. Lymphatic:  No cervical or axillary lymphadenopathy noted. Neurologic:  Alert & oriented x 3,  no focal deficits noted. Strength is equal throughout. Psychiatric:  Speech and behavior appropriate.         Results:         Lab Results    Component Value Date      WBC 5.8 10/15/2020      HGB 10.4 (L) 10/15/2020      HCT 33.2 (L) 10/15/2020      MCV 84.5 10/15/2020       10/15/2020              Lab Results    Component Value Date       10/15/2020      K 3.4 10/15/2020      CL 98 10/15/2020      CO2 25 10/15/2020      BUN 17 10/15/2020      CREATININE <0.5 10/15/2020      GLUCOSE 159 10/15/2020      CALCIUM 8.4 10/15/2020              Assessment and Plan:         Active Hospital Problems      Diagnosis Date Noted    · GI bleed [K92.2]    -Present on admission with hematemesis.  Hemoccult positive.  History of iron deficiency anemia requiring recurrent transfusions.  Per chart review and family report, patient has had multiple upper and lower endoscopies that failed to identify any source of bleeding.  Family and patient refused any further endoscopies.     -Hemoglobin 13.5 on arrival.  Down to 10.5 on 10/11 and 9.3 on 10/12. Improved to 11.1 on 10/13 and 11.4 on 10/14. 10.5 on 10/15.    -Continue PPI BID and Carafate    -Monitor and transfuse for hemoglobin less than 7      10/11/2020    · COVID-19 [U07.1]    -COVID-19 +10/11    -Patient is a longstanding resident of [address] [place] [address]    -Started on magnesium, vitamin C, zinc, remdesevir, dexamethasone, Rocephin and azithromycin    -Holding therapeutic anticoagulation secondary to GI bleed.  Patient and her  made aware it is contraindicated in her current state and both expressed understanding.    -Continue supplemental oxygen; patiently down from 12 L high flow NC to 3L NC today -CODE STATUS DNR/DNI.    -Droplet plus precautions      10/11/2020    · Pneumonia of left lower lobe due to infectious organism [J18.9]    -Secondary to COVID-19    -Chest x-ray 10/11 with left basilar airspace disease    -Continue treatment with Rocephin and azithromycin    -Continue treatment for COVID-19 as outlined above    -Continue supplemental oxygen      10/11/2020    · Colitis [K52.9]    -Patient with chronic fecal impaction and suspected stercoral colitis on previous CT abdomen pelvis.  Evidence of large stool burden as far back as May 2018 per chart review.    -Per CT abdomen/pelvis July 2019 patient with abnormally enlarged mesorectal lymph nodes and presence of rectal neoplasm could not be ruled out.  Patient and her  have declined further work-up for this.    -On Flagyl and Rocephin for possible colitis    -Continue bowel regimen with daily suppositories and oral laxatives, stool softeners      10/11/2020    · Hematemesis [K92.0]    -Now resolved    -Patient and her  have declined further endoscopies      10/11/2020    · Elevated BUN [R79.9]    -Suspect related to GI bleed and possibly in part due to dehydration    -BUN wnl with resolution of GI bleed and IV fluids      10/11/2020    · Benign essential HTN [I10]    -Continue Procardia      10/11/2020    · History of dementia [Z86.59]    -Mild.  Continue Aricept.  Patient currently alert and oriented x3.           · Other constipation [K59.09]    -Continue bowel regimen           · Fecal impaction in rectum ([facility] [address] [K56.41]    -Longstanding issue per chart review    -Continue bowel regimen      07/03/2019    · Heme positive stool [R19.5]    -See under anemia           · Anemia [D64.9]    -Longstanding history of iron deficiency anemia requiring transfusions    -EGD and colonoscopy in July 2019 with Dr. Bruce Toro with no source of bleeding identified at that time.  Admitted at [facility] for the same in October 2019 and underwent capsule endoscopy and multiple colonoscopies without evidence of bleeding.  Patient has been reports biopsies were negative for malignancy at

## 2020-10-15 NOTE — PROGRESS NOTES
Progress Note      Subjective:   Chief complaint:   Hematemesis    Interval History:   Patient seen and examined today. Down from 12 L high flow nasal cannula to 3 L nasal cannula and doing well. Patient also tolerating liquids and medications in applesauce this morning. Diet advanced to puréed which she is also tolerating. No complaints. Review of systems:     Constitutional:  Denies fever or chills. Positive for generalized weakness and chronic fatigue. Eyes:  Denies change in visual acuity or discharge. Positive for blindness. HENT:  Denies nasal congestion or sore throat. Respiratory:  Denies cough or shortness of breath. Cardiovascular:  Denies chest pain, palpitation or swelling in LEs. GI:  Denies abdominal pain, nausea, vomiting, bloody stools or diarrhea. :  Denies dysuria or frequency. Musculoskeletal:  Denies back pain or joint pain. Integument:  Denies rash or itching. Neurologic:  Denies headache, focal weakness or sensory changes. Endocrine:  Denies polyuria or polydipsia. Lymphatic:  Denies swollen glands or night sweats. Psychiatric:  Denies depression or anxiety. Past medical history, surgical history, family history and social history reviewed and unchanged compared to H&P earlier this admission.     Medications:   Scheduled Meds:   pantoprazole  40 mg Oral BID AC    vitamin D3  1,000 Units Oral Daily    cefTRIAXone (ROCEPHIN) IV  1 g Intravenous Q24H    metroNIDAZOLE  500 mg Oral Q8H    magnesium oxide  400 mg Oral Daily    zinc sulfate  50 mg Oral Daily    ascorbic acid  500 mg Oral Daily    bisacodyl  10 mg Rectal Daily    donepezil  10 mg Oral Nightly    morphine  15 mg Oral BID    therapeutic multivitamin-minerals  1 tablet Oral Daily    polycarbophil  625 mg Oral BID    senna  2 tablet Oral BID    sucralfate  1 g Oral 4x Daily    atorvastatin  10 mg Oral Daily    topiramate  25 mg Oral BID    venlafaxine  150 mg Oral Daily    sodium chloride flush  10 mL Intravenous 2 times per day    docusate sodium  100 mg Oral Daily    polyethylene glycol  17 g Oral Daily    azithromycin  500 mg Intravenous Q24H    ferrous sulfate  324 mg Oral TID WC    dexamethasone  6 mg Oral Daily    NIFEdipine  60 mg Oral Daily     Continuous Infusions:    Objective:     Vital Signs  Temp: 97.7 °F (36.5 °C)  Pulse: 92  Resp: 20  BP: 130/64  SpO2: 96 %  O2 Device: High flow nasal cannula  O2 Flow Rate (L/min): 3 L/min    Vital signs reviewed in electronic charts. Physical exam    Constitutional:  Well developed, thin and frail elderly lady lying in bed in no acute distress on 3 L nasal cannula. Eyes: Blind in both eyes. Keeps eyelids closed most of the time. HENT:  Atraumatic, external ears normal, external nose/nares normal, oropharynx moist, no pharyngeal exudates. Neck:  Supple. No JVD or thyromegaly. Respiratory:  No respiratory distress, normal breath sounds, no rales, no wheezing. On 3 L nasal cannula. Cardiovascular:  Normal rate, normal rhythm, no murmurs, no gallops, no rubs. GI:  Soft, nondistended, normal bowel sounds, nontender, no organomegaly, no mass. :  No costovertebral angle tenderness. Musculoskeletal:  No edema, no tenderness, no obvious deformities. Patient is moving all extremities. Integument:  Well hydrated, no rash. Lymphatic:  No cervical or axillary lymphadenopathy noted. Neurologic:  Alert & oriented x 3,  no focal deficits noted. Strength is equal throughout. Psychiatric:  Speech and behavior appropriate.     Results:     Lab Results   Component Value Date    WBC 5.8 10/15/2020    HGB 10.4 (L) 10/15/2020    HCT 33.2 (L) 10/15/2020    MCV 84.5 10/15/2020     10/15/2020       Lab Results   Component Value Date     10/15/2020    K 3.4 10/15/2020    CL 98 10/15/2020    CO2 25 10/15/2020    BUN 17 10/15/2020    CREATININE <0.5 10/15/2020    GLUCOSE 159 10/15/2020    CALCIUM 8.4 10/15/2020        Assessment and Plan: Active Hospital Problems     Diagnosis Date Noted    GI bleed [K92.2]  -Present on admission with hematemesis.  Hemoccult positive.  History of iron deficiency anemia requiring recurrent transfusions.  Per chart review and family report, patient has had multiple upper and lower endoscopies that failed to identify any source of bleeding.  Family and patient refused any further endoscopies.   -Hemoglobin 13.5 on arrival.  Down to 10.5 on 10/11 and 9.3 on 10/12. Improved to 11.1 on 10/13 and 11.4 on 10/14. 10.5 on 10/15.  -Continue PPI BID and Carafate  -Monitor and transfuse for hemoglobin less than 7    10/11/2020    COVID-19 [U07.1]  -COVID-19 +10/11  -Patient is a longstanding resident of [address] [place] [address]  -Started on magnesium, vitamin C, zinc, remdesevir, dexamethasone, Rocephin and azithromycin  -Holding therapeutic anticoagulation secondary to GI bleed.  Patient and her  made aware it is contraindicated in her current state and both expressed understanding.  -Continue supplemental oxygen; patiently down from 12 L high flow NC to 3L NC today -CODE STATUS DNR/DNI.  -Droplet plus precautions    10/11/2020    Pneumonia of left lower lobe due to infectious organism [J18.9]  -Secondary to COVID-19  -Chest x-ray 10/11 with left basilar airspace disease  -Continue treatment with Rocephin and azithromycin  -Continue treatment for COVID-19 as outlined above  -Continue supplemental oxygen    10/11/2020    Colitis [K52.9]  -Patient with chronic fecal impaction and suspected stercoral colitis on previous CT abdomen pelvis.  Evidence of large stool burden as far back as May 2018 per chart review.  -Per CT abdomen/pelvis July 2019 patient with abnormally enlarged mesorectal lymph nodes and presence of rectal neoplasm could not be ruled out.  Patient and her  have declined further work-up for this.  -On Flagyl and Rocephin for possible colitis  -Continue bowel regimen with daily suppositories and oral laxatives, stool softeners    10/11/2020    Hematemesis [K92.0]  -Now resolved  -Patient and her  have declined further endoscopies    10/11/2020    Elevated BUN [R79.9]  -Suspect related to GI bleed and possibly in part due to dehydration  -BUN wnl with resolution of GI bleed and IV fluids    10/11/2020    Benign essential HTN [I10]  -Continue Procardia    10/11/2020    History of dementia [Z86.59]  -Mild.  Continue Aricept.  Patient currently alert and oriented x3.         Other constipation [K59.09]  -Continue bowel regimen         Fecal impaction in rectum ([facility] [address] [K56.41]  -Longstanding issue per chart review  -Continue bowel regimen    07/03/2019    Heme positive stool [R19.5]  -See under anemia         Anemia [D64.9]  -Longstanding history of iron deficiency anemia requiring transfusions  -EGD and colonoscopy in July 2019 with Dr. Amber Harrell with no source of bleeding identified at that time.  Admitted at [facility] for the same in October 2019 and underwent capsule endoscopy and multiple colonoscopies without evidence of bleeding.  Patient has been reports biopsies were negative for malignancy at [facility]. -Patient and her  have declined any further endoscopies  -Hemoglobin 13.5 on arrival.  Down drifted to 10.5 on 10/11 after receiving IV fluids and 9.3 today.  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 Gustavo Morning is also blind with history of mild dementia although alert and oriented x3 currently.  She requires assistance with all ADLs including eating and drinking.  Continue frequent oral care.   -CODE STATUS DNR/DNI per patient request          Patient was seen and examined by Dr. Les Casas and plan of care reviewed.       Electronically signed by FLASH Mera on 10/15/2020 at 2:29 PM

## 2020-10-15 NOTE — FLOWSHEET NOTE
10/14/20 2100   Assessment   Charting Type Shift assessment   Neurological   Neuro (WDL) X   Level of Consciousness 0   Swallow Screening   Is the patient able to remain alert for testing? Yes   Was the Patient Eating a Modified Diet Prior to being Admitted?   (clear liquid diet)   [place] Coma Scale   Eye Opening 4   Best Verbal Response 4   Best Motor Response 6   [place] Coma Scale Score 14   NIH/MNHISS Stroke Scale   NIH/MNIHSS Stroke Scale Assessed No   HEENT   HEENT (WDL) X   Teeth Missing teeth   Respiratory   Respiratory (WDL) X   Respiratory Pattern Regular   Respiratory Depth Normal   Respiratory Quality/Effort Unlabored   Chest Assessment Chest expansion symmetrical   Breath Sounds   Right Upper Lobe Rhonchi   Right Middle Lobe Rhonchi   Right Lower Lobe Diminished   Left Upper Lobe Rhonchi   Left Lower Lobe Diminished   Cough and Deep Breathe   Cough and Deep Breathe Other (Comment)  (encouraged)   Cardiac   Cardiac (WDL) X   Gastrointestinal   Abdominal (WDL) X   Abdomen Inspection Distended   Tenderness No guarding   RUQ Bowel Sounds Active   LUQ Bowel Sounds Active   RLQ Bowel Sounds Active   LLQ Bowel Sounds Active   Peripheral Vascular   Peripheral Vascular (WDL) WDL   Edema None   Skin Color/Condition   Skin Color/Condition (WDL) WDL   Skin Integrity   Skin Integrity (WDL) X   Skin Integrity Redness   Location left hip   Preventative Dressing Yes   Multiple Skin Integrity Sites No   Date Applied 10/12/20   Assessed this shift? Yes  (CDI)   Musculoskeletal   Musculoskeletal (WDL) X   RL Extremity Contracture   LL Extremity Contracture   Genitourinary   Genitourinary (WDL) X  (catheter)   Anus/Rectum   Anus/Rectum (WDL) WDL   Psychosocial   Psychosocial (WDL) WDL   Pt.  Able to take pm medications well whole bigger pills cut in half-Pt tolerated well- Pt answered questions appropriately- Pt likes to lay on side for comfort- Scheduled pain pill given for chronic pain -Will monitor

## 2020-10-15 NOTE — PROGRESS NOTES
Continue to go into room and pt has taken oxygen off -saturation noted 90-92 on room air oxygen put back on pt 3 liters nasal cannula and sat noted  96-97% (3 liters nasal cannula) noted mouth breather-Will continue to monitor

## 2020-10-15 NOTE — PROGRESS NOTES
Went into room pt had pulled oxygen off noted oxygen 86% on room air- Oxygen 3 liters placed back on pt -Noted 97% -Pt  Had incontinent void- Pt cleaned up -Brief changed- Pr given drink per request -Pt tolerated well- Pt pillow support left side lying- Will monitor

## 2020-10-15 NOTE — PROGRESS NOTES
Patient oxygen requirements have much improved. RT/RN was able to decrease non-heated high flow nasal cannula to 3 LPM with pt maintaining Sp02 of 94%.

## 2020-10-16 VITALS
RESPIRATION RATE: 27 BRPM | WEIGHT: 114.31 LBS | OXYGEN SATURATION: 98 % | HEIGHT: 63 IN | HEART RATE: 105 BPM | TEMPERATURE: 97.4 F | DIASTOLIC BLOOD PRESSURE: 60 MMHG | BODY MASS INDEX: 20.25 KG/M2 | SYSTOLIC BLOOD PRESSURE: 144 MMHG

## 2020-10-16 LAB
A/G RATIO: 0.9 (ref 0.8–2)
ALBUMIN SERPL-MCNC: 3.2 G/DL (ref 3.4–4.8)
ALP BLD-CCNC: 663 U/L (ref 25–100)
ALT SERPL-CCNC: 76 U/L (ref 4–36)
ANION GAP SERPL CALCULATED.3IONS-SCNC: 11 MMOL/L (ref 3–16)
AST SERPL-CCNC: 109 U/L (ref 8–33)
BILIRUB SERPL-MCNC: <0.2 MG/DL (ref 0.3–1.2)
BUN BLDV-MCNC: 16 MG/DL (ref 6–20)
CALCIUM SERPL-MCNC: 8.3 MG/DL (ref 8.5–10.5)
CHLORIDE BLD-SCNC: 99 MMOL/L (ref 98–107)
CO2: 23 MMOL/L (ref 20–30)
CREAT SERPL-MCNC: <0.5 MG/DL (ref 0.4–1.2)
D DIMER: 0.39 UG/ML FEU (ref 0–0.6)
GFR AFRICAN AMERICAN: >59
GFR NON-AFRICAN AMERICAN: >60
GLOBULIN: 3.4 G/DL
GLUCOSE BLD-MCNC: 96 MG/DL (ref 74–106)
HCT VFR BLD CALC: 36.7 % (ref 37–47)
HEMOGLOBIN: 11.3 G/DL (ref 11.5–16.5)
INR BLD: 1.32 (ref 0.87–1.14)
MAGNESIUM: 1.6 MG/DL (ref 1.7–2.4)
MCH RBC QN AUTO: 25.9 PG (ref 27–32)
MCHC RBC AUTO-ENTMCNC: 30.8 G/DL (ref 31–35)
MCV RBC AUTO: 84 FL (ref 80–100)
PDW BLD-RTO: 13.1 % (ref 11–16)
PLATELET # BLD: 362 K/UL (ref 150–400)
PMV BLD AUTO: 10.3 FL (ref 6–10)
POTASSIUM REFLEX MAGNESIUM: 3.3 MMOL/L (ref 3.4–5.1)
PROTHROMBIN TIME: 16.4 SEC (ref 11.7–14.6)
RBC # BLD: 4.37 M/UL (ref 3.8–5.8)
SODIUM BLD-SCNC: 133 MMOL/L (ref 136–145)
TOTAL PROTEIN: 6.6 G/DL (ref 6.4–8.3)
WBC # BLD: 5.1 K/UL (ref 4–11)

## 2020-10-16 PROCEDURE — 6370000000 HC RX 637 (ALT 250 FOR IP): Performed by: PHYSICIAN ASSISTANT

## 2020-10-16 PROCEDURE — 36415 COLL VENOUS BLD VENIPUNCTURE: CPT

## 2020-10-16 PROCEDURE — 2700000000 HC OXYGEN THERAPY PER DAY

## 2020-10-16 PROCEDURE — 99238 HOSP IP/OBS DSCHRG MGMT 30/<: CPT | Performed by: INTERNAL MEDICINE

## 2020-10-16 PROCEDURE — 6360000002 HC RX W HCPCS: Performed by: PHYSICIAN ASSISTANT

## 2020-10-16 PROCEDURE — 2580000003 HC RX 258: Performed by: PHYSICIAN ASSISTANT

## 2020-10-16 PROCEDURE — 85027 COMPLETE CBC AUTOMATED: CPT

## 2020-10-16 PROCEDURE — 6370000000 HC RX 637 (ALT 250 FOR IP): Performed by: INTERNAL MEDICINE

## 2020-10-16 PROCEDURE — 83735 ASSAY OF MAGNESIUM: CPT

## 2020-10-16 PROCEDURE — 80053 COMPREHEN METABOLIC PANEL: CPT

## 2020-10-16 PROCEDURE — 2580000003 HC RX 258: Performed by: INTERNAL MEDICINE

## 2020-10-16 PROCEDURE — 85610 PROTHROMBIN TIME: CPT

## 2020-10-16 PROCEDURE — 85379 FIBRIN DEGRADATION QUANT: CPT

## 2020-10-16 RX ORDER — POTASSIUM CHLORIDE 20 MEQ/1
40 TABLET, EXTENDED RELEASE ORAL ONCE
Status: COMPLETED | OUTPATIENT
Start: 2020-10-16 | End: 2020-10-16

## 2020-10-16 RX ORDER — CHOLECALCIFEROL (VITAMIN D3) 25 MCG
1000 TABLET ORAL DAILY
Qty: 30 TABLET | Refills: 3 | Status: SHIPPED | OUTPATIENT
Start: 2020-10-17

## 2020-10-16 RX ORDER — MORPHINE SULFATE 15 MG/1
15 TABLET, FILM COATED, EXTENDED RELEASE ORAL 2 TIMES DAILY
Qty: 20 TABLET | Refills: 0 | Status: SHIPPED | OUTPATIENT
Start: 2020-10-16 | End: 2020-10-26

## 2020-10-16 RX ORDER — ZINC SULFATE 50(220)MG
50 CAPSULE ORAL DAILY
Qty: 30 CAPSULE | Refills: 3 | COMMUNITY
Start: 2020-10-17

## 2020-10-16 RX ORDER — METRONIDAZOLE 500 MG/1
500 TABLET ORAL EVERY 8 HOURS
Qty: 15 TABLET | Refills: 0 | Status: SHIPPED | OUTPATIENT
Start: 2020-10-16 | End: 2020-10-21

## 2020-10-16 RX ORDER — DEXAMETHASONE 6 MG/1
6 TABLET ORAL DAILY
Qty: 5 TABLET | Refills: 0 | Status: SHIPPED | OUTPATIENT
Start: 2020-10-17 | End: 2020-10-22

## 2020-10-16 RX ORDER — NIFEDIPINE 60 MG/1
60 TABLET, FILM COATED, EXTENDED RELEASE ORAL DAILY
Qty: 30 TABLET | Refills: 3 | Status: SHIPPED | OUTPATIENT
Start: 2020-10-17

## 2020-10-16 RX ADMIN — TOPIRAMATE 25 MG: 25 TABLET, FILM COATED ORAL at 09:24

## 2020-10-16 RX ADMIN — AZITHROMYCIN DIHYDRATE 500 MG: 500 INJECTION, POWDER, LYOPHILIZED, FOR SOLUTION INTRAVENOUS at 11:12

## 2020-10-16 RX ADMIN — METRONIDAZOLE 500 MG: 500 TABLET ORAL at 09:22

## 2020-10-16 RX ADMIN — MULTIPLE VITAMINS W/ MINERALS TAB 1 TABLET: TAB at 09:23

## 2020-10-16 RX ADMIN — ZINC SULFATE 220 MG (50 MG) CAPSULE 50 MG: CAPSULE at 09:22

## 2020-10-16 RX ADMIN — MORPHINE SULFATE 15 MG: 15 TABLET, FILM COATED, EXTENDED RELEASE ORAL at 09:22

## 2020-10-16 RX ADMIN — POTASSIUM CHLORIDE 40 MEQ: 1500 TABLET, EXTENDED RELEASE ORAL at 09:33

## 2020-10-16 RX ADMIN — NIFEDIPINE 60 MG: 60 TABLET, FILM COATED, EXTENDED RELEASE ORAL at 09:23

## 2020-10-16 RX ADMIN — CALCIUM POLYCARBOPHIL 625 MG TABLET 625 MG: at 09:22

## 2020-10-16 RX ADMIN — DOCUSATE SODIUM 100 MG: 100 CAPSULE, LIQUID FILLED ORAL at 09:24

## 2020-10-16 RX ADMIN — VENLAFAXINE 150 MG: 75 TABLET ORAL at 09:24

## 2020-10-16 RX ADMIN — OXYCODONE HYDROCHLORIDE AND ACETAMINOPHEN 500 MG: 500 TABLET ORAL at 09:24

## 2020-10-16 RX ADMIN — SENNOSIDES 17.2 MG: 8.6 TABLET, FILM COATED ORAL at 09:22

## 2020-10-16 RX ADMIN — BISACODYL 10 MG: 10 SUPPOSITORY RECTAL at 09:24

## 2020-10-16 RX ADMIN — METRONIDAZOLE 500 MG: 500 TABLET ORAL at 01:49

## 2020-10-16 RX ADMIN — FERROUS SULFATE TAB EC 324 MG (65 MG FE EQUIVALENT) 324 MG: 324 (65 FE) TABLET DELAYED RESPONSE at 13:00

## 2020-10-16 RX ADMIN — Medication 1000 UNITS: at 09:24

## 2020-10-16 RX ADMIN — PANTOPRAZOLE SODIUM 40 MG: 40 TABLET, DELAYED RELEASE ORAL at 09:23

## 2020-10-16 RX ADMIN — PANTOPRAZOLE SODIUM 40 MG: 40 TABLET, DELAYED RELEASE ORAL at 04:32

## 2020-10-16 RX ADMIN — POLYETHYLENE GLYCOL 3350 17 G: 17 POWDER, FOR SOLUTION ORAL at 09:25

## 2020-10-16 RX ADMIN — MAGNESIUM OXIDE TAB 400 MG (241.3 MG ELEMENTAL MG) 400 MG: 400 (241.3 MG) TAB at 09:23

## 2020-10-16 RX ADMIN — FERROUS SULFATE TAB EC 324 MG (65 MG FE EQUIVALENT) 324 MG: 324 (65 FE) TABLET DELAYED RESPONSE at 09:22

## 2020-10-16 RX ADMIN — SUCRALFATE 1 G: 1 TABLET ORAL at 09:23

## 2020-10-16 RX ADMIN — Medication 10 ML: at 09:21

## 2020-10-16 RX ADMIN — DEXAMETHASONE 6 MG: 4 TABLET ORAL at 09:22

## 2020-10-16 ASSESSMENT — PAIN SCALES - GENERAL
PAINLEVEL_OUTOF10: 0
PAINLEVEL_OUTOF10: 0

## 2020-10-16 NOTE — PROGRESS NOTES
Changed wet brief, pt can roll easily to the right w/o assistance, she cant roll to the left. There are no open areas noted. She tolerated labs. No complaints at this time.

## 2020-10-16 NOTE — FLOWSHEET NOTE
10/15/20 2019   Assessment   Charting Type Shift assessment   Neurological   Neuro (WDL) X   Level of Consciousness 0   Orientation Level Oriented to person;Disoriented to time;Disoriented to place; Disoriented to situation   Cognition Follows commands   Hamzah Coma Scale   Eye Opening 4   Best Verbal Response 4   Best Motor Response 6   [place] Coma Scale Score 14   HEENT   HEENT (WDL) X   Teeth Missing teeth   Respiratory   Respiratory (WDL) X   Respiratory Pattern Regular   Respiratory Depth Normal   Respiratory Quality/Effort Unlabored   Chest Assessment Chest expansion symmetrical   Breath Sounds   Right Upper Lobe Diminished   Right Middle Lobe Diminished   Right Lower Lobe Diminished   Left Upper Lobe Diminished   Left Lower Lobe Diminished   Cardiac   Cardiac (WDL) X   Cardiac Monitor   Telemetry Monitor On Yes   Telemetry Audible Yes   Telemetry Alarms Set Yes   Telemetry Box Number [id number]   Gastrointestinal   Abdominal (WDL) X   Abdomen Inspection Soft   Tenderness No guarding   RUQ Bowel Sounds Active   LUQ Bowel Sounds Active   RLQ Bowel Sounds Active   LLQ Bowel Sounds Active   Peripheral Vascular   Peripheral Vascular (WDL) WDL   Edema None   Skin Color/Condition   Skin Color/Condition (WDL) WDL   Skin Integrity   Skin Integrity (WDL) X   Skin Integrity Redness   Location lt  hip   Assessed this shift? Yes   Musculoskeletal   Musculoskeletal (WDL) X   RL Extremity Contracture   LL Extremity Contracture   Genitourinary   Genitourinary (WDL) X   Urine Assessment   Incontinence Yes   Anus/Rectum   Anus/Rectum (WDL) WDL   Psychosocial   Psychosocial (WDL) WDL   is alert and oriented to self . She has no opened areas to not. She took medications well. No complaints at this time.

## 2020-10-16 NOTE — FLOWSHEET NOTE
10/16/20 0830   Assessment   Charting Type Shift assessment   Neurological   Neuro (WDL) X   Level of Consciousness 0   Orientation Level Oriented to person;Disoriented to time;Disoriented to place; Disoriented to situation   Cognition Follows commands   [place] Coma Scale   Eye Opening 4   Best Verbal Response 4   Best Motor Response 6   [place] Coma Scale Score 14   HEENT   HEENT (WDL) X   Teeth Missing teeth   Respiratory   Respiratory (WDL) X   Respiratory Pattern Regular   Respiratory Depth Normal   Respiratory Quality/Effort Unlabored   Chest Assessment Chest expansion symmetrical   Breath Sounds   Right Upper Lobe Rhonchi   Right Middle Lobe Rhonchi   Right Lower Lobe Diminished   Left Upper Lobe Rhonchi   Left Lower Lobe Diminished   Cardiac   Cardiac (WDL) X  (hx of afib)   Cardiac Monitor   Telemetry Monitor On Yes   Telemetry Audible Yes   Telemetry Alarms Set Yes   Telemetry Box Number [id number]   Gastrointestinal   Abdominal (WDL) X   Abdomen Inspection Soft   Tenderness No guarding   RUQ Bowel Sounds Active   LUQ Bowel Sounds Active   RLQ Bowel Sounds Active   LLQ Bowel Sounds Active   Peripheral Vascular   Peripheral Vascular (WDL) WDL   Edema None   Skin Color/Condition   Skin Color/Condition (WDL) WDL   Skin Integrity   Skin Integrity (WDL) X   Skin Integrity Redness   Location left hip   Preventative Dressing Yes   Musculoskeletal   Musculoskeletal (WDL) X   RL Extremity Contracture   LL Extremity Contracture   Genitourinary   Genitourinary (WDL) X  (incontinent)   Urine Assessment   Incontinence Yes   Anus/Rectum   Anus/Rectum (WDL) WDL   Psychosocial   Psychosocial (WDL) WDL   Productive cough. PIV removed after antibiotic infused. Upon entering room, pt had oxygen cannula out of nose and sats 98%. After pt started eating breakfast, she desat to 89%. NC reapplied at 2 lpm.  Was spitting out morning pills. Crushed medications and gave in applesauce.

## 2020-10-16 NOTE — PLAN OF CARE
Problem: Airway Clearance - Ineffective  Goal: Achieve or maintain patent airway  Outcome: Completed     Problem: Gas Exchange - Impaired  Goal: Absence of hypoxia  Outcome: Completed  Goal: Promote optimal lung function  Outcome: Completed     Problem: Breathing Pattern - Ineffective  Goal: Ability to achieve and maintain a regular respiratory rate  Outcome: Completed     Problem:  Body Temperature -  Risk of, Imbalanced  Goal: Ability to maintain a body temperature within defined limits  Outcome: Completed  Goal: Will regain or maintain usual level of consciousness  Outcome: Completed  Goal: Complications related to the disease process, condition or treatment will be avoided or minimized  Outcome: Completed     Problem: Isolation Precautions - Risk of Spread of Infection  Goal: Prevent transmission of infection  Outcome: Completed     Problem: Nutrition Deficits  Goal: Optimize nutrtional status  Outcome: Completed     Problem: Risk for Fluid Volume Deficit  Goal: Maintain normal heart rhythm  Outcome: Completed  Goal: Maintain absence of muscle cramping  Outcome: Completed  Goal: Maintain normal serum potassium, sodium, calcium, phosphorus, and pH  Outcome: Completed     Problem: Loneliness or Risk for Loneliness  Goal: Demonstrate positive use of time alone when socialization is not possible  Outcome: Completed     Problem: Patient Education: Go to Patient Education Activity  Goal: Patient/Family Education  Outcome: Completed     Problem: Skin Integrity:  Goal: Will show no infection signs and symptoms  Description: Will show no infection signs and symptoms  Outcome: Completed  Goal: Absence of new skin breakdown  Description: Absence of new skin breakdown  Outcome: Completed

## 2020-10-16 NOTE — PROGRESS NOTES
Report given to nurse at [facility]; for discharge home.

## 2020-10-16 NOTE — PROGRESS NOTES
[facility]

## 2020-11-08 ENCOUNTER — HOSPITAL ENCOUNTER (INPATIENT)
Facility: HOSPITAL | Age: 75
LOS: 1 days | Discharge: NURSING, REHAB OR LONG-TERM CARE | DRG: 178 | End: 2020-11-10
Attending: EMERGENCY MEDICINE | Admitting: INTERNAL MEDICINE
Payer: MEDICARE

## 2020-11-08 ENCOUNTER — APPOINTMENT (OUTPATIENT)
Dept: CT IMAGING | Facility: HOSPITAL | Age: 75
DRG: 178 | End: 2020-11-08
Payer: MEDICARE

## 2020-11-08 LAB
A/G RATIO: 1.1 (ref 0.8–2)
ABO/RH: NORMAL
ALBUMIN SERPL-MCNC: 4.3 G/DL (ref 3.4–4.8)
ALP BLD-CCNC: 264 U/L (ref 25–100)
ALT SERPL-CCNC: 19 U/L (ref 4–36)
ANION GAP SERPL CALCULATED.3IONS-SCNC: 14 MMOL/L (ref 3–16)
ANTIBODY SCREEN: NORMAL
AST SERPL-CCNC: 23 U/L (ref 8–33)
BASOPHILS ABSOLUTE: 0 K/UL (ref 0–0.1)
BASOPHILS RELATIVE PERCENT: 0.2 %
BILIRUB SERPL-MCNC: 0.4 MG/DL (ref 0.3–1.2)
BUN BLDV-MCNC: 36 MG/DL (ref 6–20)
CALCIUM SERPL-MCNC: 10.2 MG/DL (ref 8.5–10.5)
CHLORIDE BLD-SCNC: 96 MMOL/L (ref 98–107)
CO2: 24 MMOL/L (ref 20–30)
CREAT SERPL-MCNC: 0.7 MG/DL (ref 0.4–1.2)
EOSINOPHILS ABSOLUTE: 0 K/UL (ref 0–0.4)
EOSINOPHILS RELATIVE PERCENT: 0 %
GFR AFRICAN AMERICAN: >59
GFR NON-AFRICAN AMERICAN: >60
GLOBULIN: 3.9 G/DL
GLUCOSE BLD-MCNC: 187 MG/DL (ref 74–106)
HCT VFR BLD CALC: 41.1 % (ref 37–47)
HEMOGLOBIN: 12.6 G/DL (ref 11.5–16.5)
IMMATURE GRANULOCYTES #: 0 K/UL
IMMATURE GRANULOCYTES %: 0.3 % (ref 0–5)
LIPASE: 16 U/L (ref 5.6–51.3)
LYMPHOCYTES ABSOLUTE: 0.5 K/UL (ref 1.5–4)
LYMPHOCYTES RELATIVE PERCENT: 4.9 %
MCH RBC QN AUTO: 26.4 PG (ref 27–32)
MCHC RBC AUTO-ENTMCNC: 30.7 G/DL (ref 31–35)
MCV RBC AUTO: 86.2 FL (ref 80–100)
MONOCYTES ABSOLUTE: 0.2 K/UL (ref 0.2–0.8)
MONOCYTES RELATIVE PERCENT: 1.9 %
NEUTROPHILS ABSOLUTE: 10.1 K/UL (ref 2–7.5)
NEUTROPHILS RELATIVE PERCENT: 92.7 %
PDW BLD-RTO: 14.7 % (ref 11–16)
PLATELET # BLD: 353 K/UL (ref 150–400)
PMV BLD AUTO: 10.6 FL (ref 6–10)
POTASSIUM SERPL-SCNC: 3.7 MMOL/L (ref 3.4–5.1)
PRO-BNP: 258 PG/ML (ref 0–1800)
RBC # BLD: 4.77 M/UL (ref 3.8–5.8)
SODIUM BLD-SCNC: 134 MMOL/L (ref 136–145)
TOTAL PROTEIN: 8.2 G/DL (ref 6.4–8.3)
TROPONIN: <0.3 NG/ML
WBC # BLD: 10.9 K/UL (ref 4–11)

## 2020-11-08 PROCEDURE — 80053 COMPREHEN METABOLIC PANEL: CPT

## 2020-11-08 PROCEDURE — 83690 ASSAY OF LIPASE: CPT

## 2020-11-08 PROCEDURE — 74176 CT ABD & PELVIS W/O CONTRAST: CPT

## 2020-11-08 PROCEDURE — 86901 BLOOD TYPING SEROLOGIC RH(D): CPT

## 2020-11-08 PROCEDURE — 84484 ASSAY OF TROPONIN QUANT: CPT

## 2020-11-08 PROCEDURE — 96374 THER/PROPH/DIAG INJ IV PUSH: CPT

## 2020-11-08 PROCEDURE — 2500000003 HC RX 250 WO HCPCS: Performed by: EMERGENCY MEDICINE

## 2020-11-08 PROCEDURE — 2580000003 HC RX 258: Performed by: EMERGENCY MEDICINE

## 2020-11-08 PROCEDURE — 36415 COLL VENOUS BLD VENIPUNCTURE: CPT

## 2020-11-08 PROCEDURE — 83880 ASSAY OF NATRIURETIC PEPTIDE: CPT

## 2020-11-08 PROCEDURE — 99285 EMERGENCY DEPT VISIT HI MDM: CPT

## 2020-11-08 PROCEDURE — 86850 RBC ANTIBODY SCREEN: CPT

## 2020-11-08 PROCEDURE — 86900 BLOOD TYPING SEROLOGIC ABO: CPT

## 2020-11-08 PROCEDURE — 93005 ELECTROCARDIOGRAM TRACING: CPT

## 2020-11-08 PROCEDURE — 85025 COMPLETE CBC W/AUTO DIFF WBC: CPT

## 2020-11-08 PROCEDURE — 96361 HYDRATE IV INFUSION ADD-ON: CPT

## 2020-11-08 RX ORDER — MORPHINE SULFATE 15 MG/1
15 TABLET, FILM COATED, EXTENDED RELEASE ORAL 2 TIMES DAILY
Status: ON HOLD | COMMUNITY
End: 2020-11-10 | Stop reason: SDUPTHER

## 2020-11-08 RX ORDER — 0.9 % SODIUM CHLORIDE 0.9 %
1000 INTRAVENOUS SOLUTION INTRAVENOUS ONCE
Status: COMPLETED | OUTPATIENT
Start: 2020-11-08 | End: 2020-11-09

## 2020-11-08 RX ORDER — SIMVASTATIN 40 MG
40 TABLET ORAL NIGHTLY
COMMUNITY

## 2020-11-08 RX ORDER — 0.9 % SODIUM CHLORIDE 0.9 %
500 INTRAVENOUS SOLUTION INTRAVENOUS ONCE
Status: COMPLETED | OUTPATIENT
Start: 2020-11-09 | End: 2020-11-09

## 2020-11-08 RX ADMIN — SODIUM CHLORIDE 1000 ML: 9 INJECTION, SOLUTION INTRAVENOUS at 22:18

## 2020-11-08 RX ADMIN — FAMOTIDINE 20 MG: 10 INJECTION, SOLUTION INTRAVENOUS at 22:18

## 2020-11-08 ASSESSMENT — PAIN SCALES - GENERAL: PAINLEVEL_OUTOF10: 10

## 2020-11-08 ASSESSMENT — PAIN DESCRIPTION - PAIN TYPE: TYPE: ACUTE PAIN

## 2020-11-08 ASSESSMENT — PAIN DESCRIPTION - FREQUENCY: FREQUENCY: INTERMITTENT

## 2020-11-08 ASSESSMENT — PAIN DESCRIPTION - LOCATION: LOCATION: ABDOMEN

## 2020-11-09 ENCOUNTER — APPOINTMENT (OUTPATIENT)
Dept: GENERAL RADIOLOGY | Facility: HOSPITAL | Age: 75
DRG: 178 | End: 2020-11-09
Payer: MEDICARE

## 2020-11-09 PROBLEM — K92.2 UPPER GI BLEED: Status: ACTIVE | Noted: 2020-11-09

## 2020-11-09 LAB
ANION GAP SERPL CALCULATED.3IONS-SCNC: 9 MMOL/L (ref 3–16)
BUN BLDV-MCNC: 25 MG/DL (ref 6–20)
CALCIUM SERPL-MCNC: 8.4 MG/DL (ref 8.5–10.5)
CHLORIDE BLD-SCNC: 104 MMOL/L (ref 98–107)
CO2: 25 MMOL/L (ref 20–30)
CREAT SERPL-MCNC: <0.5 MG/DL (ref 0.4–1.2)
GFR AFRICAN AMERICAN: >59
GFR NON-AFRICAN AMERICAN: >60
GLUCOSE BLD-MCNC: 110 MG/DL (ref 74–106)
HCT VFR BLD CALC: 32.3 % (ref 37–47)
HCT VFR BLD CALC: 33.7 % (ref 37–47)
HEMOGLOBIN: 10.2 G/DL (ref 11.5–16.5)
HEMOGLOBIN: 9.7 G/DL (ref 11.5–16.5)
MCH RBC QN AUTO: 26.5 PG (ref 27–32)
MCH RBC QN AUTO: 26.8 PG (ref 27–32)
MCHC RBC AUTO-ENTMCNC: 30 G/DL (ref 31–35)
MCHC RBC AUTO-ENTMCNC: 30.3 G/DL (ref 31–35)
MCV RBC AUTO: 88.3 FL (ref 80–100)
MCV RBC AUTO: 88.5 FL (ref 80–100)
PDW BLD-RTO: 14.8 % (ref 11–16)
PDW BLD-RTO: 14.8 % (ref 11–16)
PLATELET # BLD: 235 K/UL (ref 150–400)
PLATELET # BLD: 237 K/UL (ref 150–400)
PMV BLD AUTO: 10.4 FL (ref 6–10)
PMV BLD AUTO: 10.7 FL (ref 6–10)
POTASSIUM SERPL-SCNC: 3 MMOL/L (ref 3.4–5.1)
RBC # BLD: 3.66 M/UL (ref 3.8–5.8)
RBC # BLD: 3.81 M/UL (ref 3.8–5.8)
SARS-COV-2, NAAT: DETECTED
SARS-COV-2, PCR: ABNORMAL
SODIUM BLD-SCNC: 138 MMOL/L (ref 136–145)
WBC # BLD: 4.9 K/UL (ref 4–11)
WBC # BLD: 5.3 K/UL (ref 4–11)

## 2020-11-09 PROCEDURE — 71045 X-RAY EXAM CHEST 1 VIEW: CPT

## 2020-11-09 PROCEDURE — 80048 BASIC METABOLIC PNL TOTAL CA: CPT

## 2020-11-09 PROCEDURE — 6360000002 HC RX W HCPCS: Performed by: INTERNAL MEDICINE

## 2020-11-09 PROCEDURE — C9113 INJ PANTOPRAZOLE SODIUM, VIA: HCPCS | Performed by: INTERNAL MEDICINE

## 2020-11-09 PROCEDURE — 2700000000 HC OXYGEN THERAPY PER DAY

## 2020-11-09 PROCEDURE — 6370000000 HC RX 637 (ALT 250 FOR IP): Performed by: INTERNAL MEDICINE

## 2020-11-09 PROCEDURE — U0002 COVID-19 LAB TEST NON-CDC: HCPCS

## 2020-11-09 PROCEDURE — 99222 1ST HOSP IP/OBS MODERATE 55: CPT | Performed by: INTERNAL MEDICINE

## 2020-11-09 PROCEDURE — 2580000003 HC RX 258: Performed by: INTERNAL MEDICINE

## 2020-11-09 PROCEDURE — 96376 TX/PRO/DX INJ SAME DRUG ADON: CPT

## 2020-11-09 PROCEDURE — 85027 COMPLETE CBC AUTOMATED: CPT

## 2020-11-09 PROCEDURE — U0003 INFECTIOUS AGENT DETECTION BY NUCLEIC ACID (DNA OR RNA); SEVERE ACUTE RESPIRATORY SYNDROME CORONAVIRUS 2 (SARS-COV-2) (CORONAVIRUS DISEASE [COVID-19]), AMPLIFIED PROBE TECHNIQUE, MAKING USE OF HIGH THROUGHPUT TECHNOLOGIES AS DESCRIBED BY CMS-2020-01-R: HCPCS

## 2020-11-09 PROCEDURE — 36415 COLL VENOUS BLD VENIPUNCTURE: CPT

## 2020-11-09 PROCEDURE — 1200000000 HC SEMI PRIVATE

## 2020-11-09 PROCEDURE — 2580000003 HC RX 258: Performed by: EMERGENCY MEDICINE

## 2020-11-09 PROCEDURE — G0378 HOSPITAL OBSERVATION PER HR: HCPCS

## 2020-11-09 PROCEDURE — 96375 TX/PRO/DX INJ NEW DRUG ADDON: CPT

## 2020-11-09 RX ORDER — ATORVASTATIN CALCIUM 10 MG/1
10 TABLET, FILM COATED ORAL DAILY
Status: DISCONTINUED | OUTPATIENT
Start: 2020-11-09 | End: 2020-11-10 | Stop reason: HOSPADM

## 2020-11-09 RX ORDER — TOPIRAMATE 25 MG/1
25 TABLET ORAL 2 TIMES DAILY
Status: DISCONTINUED | OUTPATIENT
Start: 2020-11-09 | End: 2020-11-10 | Stop reason: HOSPADM

## 2020-11-09 RX ORDER — ONDANSETRON 2 MG/ML
4 INJECTION INTRAMUSCULAR; INTRAVENOUS EVERY 6 HOURS PRN
Status: DISCONTINUED | OUTPATIENT
Start: 2020-11-09 | End: 2020-11-09

## 2020-11-09 RX ORDER — BISACODYL 10 MG
10 SUPPOSITORY, RECTAL RECTAL DAILY
Status: DISCONTINUED | OUTPATIENT
Start: 2020-11-09 | End: 2020-11-10 | Stop reason: HOSPADM

## 2020-11-09 RX ORDER — ACETAMINOPHEN 650 MG/1
650 SUPPOSITORY RECTAL EVERY 4 HOURS PRN
Status: DISCONTINUED | OUTPATIENT
Start: 2020-11-09 | End: 2020-11-10 | Stop reason: HOSPADM

## 2020-11-09 RX ORDER — VITAMIN B COMPLEX
1000 TABLET ORAL DAILY
Status: DISCONTINUED | OUTPATIENT
Start: 2020-11-09 | End: 2020-11-10 | Stop reason: HOSPADM

## 2020-11-09 RX ORDER — MORPHINE SULFATE 15 MG/1
15 TABLET, FILM COATED, EXTENDED RELEASE ORAL 2 TIMES DAILY
Status: DISCONTINUED | OUTPATIENT
Start: 2020-11-09 | End: 2020-11-10 | Stop reason: HOSPADM

## 2020-11-09 RX ORDER — CALCIUM POLYCARBOPHIL 625 MG 625 MG/1
625 TABLET ORAL 2 TIMES DAILY
Status: DISCONTINUED | OUTPATIENT
Start: 2020-11-09 | End: 2020-11-10 | Stop reason: HOSPADM

## 2020-11-09 RX ORDER — PANTOPRAZOLE SODIUM 40 MG/10ML
INJECTION, POWDER, LYOPHILIZED, FOR SOLUTION INTRAVENOUS
Status: DISCONTINUED
Start: 2020-11-09 | End: 2020-11-09

## 2020-11-09 RX ORDER — SENNA PLUS 8.6 MG/1
2 TABLET ORAL DAILY PRN
Status: DISCONTINUED | OUTPATIENT
Start: 2020-11-09 | End: 2020-11-10 | Stop reason: HOSPADM

## 2020-11-09 RX ORDER — DONEPEZIL HYDROCHLORIDE 5 MG/1
10 TABLET, FILM COATED ORAL NIGHTLY
Status: DISCONTINUED | OUTPATIENT
Start: 2020-11-09 | End: 2020-11-10 | Stop reason: HOSPADM

## 2020-11-09 RX ORDER — SODIUM CHLORIDE 9 MG/ML
INJECTION, SOLUTION INTRAVENOUS CONTINUOUS
Status: DISCONTINUED | OUTPATIENT
Start: 2020-11-09 | End: 2020-11-09

## 2020-11-09 RX ORDER — ZINC SULFATE 50(220)MG
50 CAPSULE ORAL DAILY
Status: DISCONTINUED | OUTPATIENT
Start: 2020-11-09 | End: 2020-11-10 | Stop reason: HOSPADM

## 2020-11-09 RX ORDER — M-VIT,TX,IRON,MINS/CALC/FOLIC 27MG-0.4MG
1 TABLET ORAL DAILY
Status: DISCONTINUED | OUTPATIENT
Start: 2020-11-09 | End: 2020-11-10 | Stop reason: HOSPADM

## 2020-11-09 RX ORDER — SUCRALFATE 1 G/1
1 TABLET ORAL 4 TIMES DAILY
Status: DISCONTINUED | OUTPATIENT
Start: 2020-11-09 | End: 2020-11-10 | Stop reason: HOSPADM

## 2020-11-09 RX ORDER — FERROUS SULFATE TAB EC 324 MG (65 MG FE EQUIVALENT) 324 (65 FE) MG
325 TABLET DELAYED RESPONSE ORAL
Status: DISCONTINUED | OUTPATIENT
Start: 2020-11-09 | End: 2020-11-09 | Stop reason: ALTCHOICE

## 2020-11-09 RX ORDER — FERROUS SULFATE TAB EC 324 MG (65 MG FE EQUIVALENT) 324 (65 FE) MG
324 TABLET DELAYED RESPONSE ORAL
Status: DISCONTINUED | OUTPATIENT
Start: 2020-11-09 | End: 2020-11-10 | Stop reason: HOSPADM

## 2020-11-09 RX ADMIN — FERROUS SULFATE TAB EC 324 MG (65 MG FE EQUIVALENT) 324 MG: 324 (65 FE) TABLET DELAYED RESPONSE at 18:23

## 2020-11-09 RX ADMIN — BISACODYL 10 MG: 10 SUPPOSITORY RECTAL at 11:00

## 2020-11-09 RX ADMIN — MORPHINE SULFATE 15 MG: 15 TABLET, FILM COATED, EXTENDED RELEASE ORAL at 20:11

## 2020-11-09 RX ADMIN — ONDANSETRON HYDROCHLORIDE 4 MG: 2 INJECTION, SOLUTION INTRAMUSCULAR; INTRAVENOUS at 03:10

## 2020-11-09 RX ADMIN — DONEPEZIL HYDROCHLORIDE 10 MG: 5 TABLET, FILM COATED ORAL at 20:11

## 2020-11-09 RX ADMIN — TOPIRAMATE 25 MG: 25 TABLET, FILM COATED ORAL at 20:11

## 2020-11-09 RX ADMIN — SODIUM CHLORIDE 500 ML: 9 INJECTION, SOLUTION INTRAVENOUS at 02:25

## 2020-11-09 RX ADMIN — MORPHINE SULFATE 15 MG: 15 TABLET, FILM COATED, EXTENDED RELEASE ORAL at 10:37

## 2020-11-09 RX ADMIN — CALCIUM POLYCARBOPHIL 625 MG TABLET 625 MG: at 20:11

## 2020-11-09 RX ADMIN — VENLAFAXINE 100 MG: 75 TABLET ORAL at 10:39

## 2020-11-09 RX ADMIN — SUCRALFATE 1 G: 1 TABLET ORAL at 20:11

## 2020-11-09 RX ADMIN — SUCRALFATE 1 G: 1 TABLET ORAL at 18:23

## 2020-11-09 RX ADMIN — SODIUM CHLORIDE 8 MG/HR: 9 INJECTION, SOLUTION INTRAVENOUS at 15:28

## 2020-11-09 RX ADMIN — SODIUM CHLORIDE 8 MG/HR: 9 INJECTION, SOLUTION INTRAVENOUS at 03:10

## 2020-11-09 RX ADMIN — SODIUM CHLORIDE: 9 INJECTION, SOLUTION INTRAVENOUS at 03:11

## 2020-11-09 RX ADMIN — MAGNESIUM OXIDE TAB 400 MG (241.3 MG ELEMENTAL MG) 400 MG: 400 (241.3 MG) TAB at 10:37

## 2020-11-09 RX ADMIN — SUCRALFATE 1 G: 1 TABLET ORAL at 15:04

## 2020-11-09 ASSESSMENT — PAIN SCALES - GENERAL
PAINLEVEL_OUTOF10: 0
PAINLEVEL_OUTOF10: 7
PAINLEVEL_OUTOF10: 0
PAINLEVEL_OUTOF10: 4
PAINLEVEL_OUTOF10: 0
PAINLEVEL_OUTOF10: 10

## 2020-11-09 ASSESSMENT — PAIN DESCRIPTION - ONSET: ONSET: AWAKENED FROM SLEEP

## 2020-11-09 ASSESSMENT — PAIN DESCRIPTION - PAIN TYPE: TYPE: ACUTE PAIN

## 2020-11-09 ASSESSMENT — PAIN DESCRIPTION - LOCATION: LOCATION: ABDOMEN

## 2020-11-09 ASSESSMENT — PAIN DESCRIPTION - PROGRESSION: CLINICAL_PROGRESSION: OTHER (COMMENT)

## 2020-11-09 ASSESSMENT — PAIN DESCRIPTION - FREQUENCY: FREQUENCY: OTHER (COMMENT)

## 2020-11-09 NOTE — FLOWSHEET NOTE
11/09/20 0151   Assessment   Charting Type Admission   Neurological   Neuro (WDL) WDL   Orientation Level Oriented X4   Cognition Follows commands   Swallow Screening   Is the patient able to remain alert for testing? Yes   [place] Coma Scale   Eye Opening 4   Best Verbal Response 5   Best Motor Response 6   [place] Coma Scale Score 15   NIH/MNHISS Stroke Scale   NIH/MNIHSS Stroke Scale Assessed No   HEENT   HEENT (WDL) X   Teeth Missing teeth   Left Eye Blind   Right Eye Blind   Respiratory   Respiratory (WDL) WDL   Respiratory Pattern Regular   Respiratory Depth Normal   Respiratory Quality/Effort Unlabored   Chest Assessment Chest expansion symmetrical   Breath Sounds   Right Upper Lobe Clear   Right Middle Lobe Diminished   Right Lower Lobe Diminished   Left Upper Lobe Clear   Left Lower Lobe Diminished   Cough and Deep Breathe   Cough and Deep Breathe Other (Comment)  (ENCOURAGED)   Cardiac   Cardiac (WDL) X  (HX OF AFIB)   Cardiac Rhythm ST   Cardiac Monitor   Telemetry Monitor On Yes   Telemetry Audible Yes   Telemetry Alarms Set Yes   Telemetry Box Number [id number]   Bedside Cardiac Monitor On Yes   Bedside Cardiac Audible Yes   Bedside Cardiac Alarms Set Yes   Gastrointestinal   Abdominal (WDL) X   RUQ Bowel Sounds Active   LUQ Bowel Sounds Active   RLQ Bowel Sounds Active   LLQ Bowel Sounds Active   Abdomen Inspection Distended; Soft   Tenderness Tenderness   GI Symptoms Bloating;Constipation;Distention;Nausea;Vomiting   Last BM (including prior to admit) 11/09/20   Peripheral Vascular   Peripheral Vascular (WDL) WDL   Edema None   Skin Integrity   Skin Integrity (WDL) X   Skin Integrity Redness   Location SACRUM  (BLANCHABLE)   Assessed this shift?  No   Multiple Skin Integrity Sites No   Musculoskeletal   Musculoskeletal (WDL) X   RL Extremity Contracture   LL Extremity Contracture   Genitourinary   Genitourinary (WDL) X  (INCONTINENT)   Anus/Rectum   Anus/Rectum (WDL) WDL   Psychosocial   Psychosocial (WDL) WDL   Pt admitted from [facility] for emesis and abdominal pain- Pt alert times 4-Noted blind in both eyes Pt contracted ble- redness to sacrum-blanchable -No edema in feet noted- Pt states,\" I have been sick to my stomach and vomited today , I've been sick about 2-3 days. \" Pt having bowel movement when arrived to floor-Pt cleaned up-incontinent -green soft stool noted - Abdominal distention, soft, bowel sounds times 4- Droplet Plus precautions placed for COVID positive -Aspiration precautions placed for nausea and vomiting- prn zofran given IV for nausea- Pt NPO at this time-Vital signs WNL's slight temp 99.6 upon arrival to floor-Telemetry placed with oxygen saturation monitor- Will continue to monitor and assess

## 2020-11-09 NOTE — PLAN OF CARE
Problem: Nausea/Vomiting:  Goal: Absence of nausea/vomiting  Description: Absence of nausea/vomiting  11/9/2020 0944 by Stefanie Heath RN  Outcome: Met This Shift  11/9/2020 0227 by Pan Prasad RN  Outcome: Ongoing     Problem:  Bowel/Gastric:  Goal: Ability to achieve a regular elimination pattern will improve  Description: Ability to achieve a regular elimination pattern will improve  11/9/2020 0227 by Pan Prasad RN  Outcome: Ongoing

## 2020-11-09 NOTE — ACP (ADVANCE CARE PLANNING)
Advance Care Planning     Advance Care Planning Activator (Inpatient)  Conversation Note      Date of ACP Conversation: 11/8/2020    Conversation Conducted with:  Healthcare Decision Maker: Next of Kin by law (only applies in absence of above) (name) Alan Suárez (spouse)    ACP Activator: Sasha Reid    *When Decision Maker makes decisions on behalf of the incapacitated patient: Decision Maker is asked to consider and make decisions based on patient values, known preferences, or best interests. Health Care Decision Maker:     Current Designated Health Care Decision Maker:   (If there is a valid [address] named in the [facility] [employer] Makers\" box in the ACP activity, but it is not visible above, be sure to open that field and then select the health care decision maker relationship (ie \"primary\") in the blank space to the right of the name.) Validate  this information as still accurate & up-to-date; edit [address] field as needed.)    Note: Assess and validate information in current ACP documents, as indicated. If no Decision Maker listed above or available through scanned documents, then:    If no Authorized Decision Maker has previously been identified, then patient chooses [address]:  \"Who would you like to name as your primary health care decision-maker? \"               Name: Alan Suárez        Relationship: spouse          Phone number: [phone] or [phone]  Srikanth Peeks this person be reached easily? \" Yes  \"Who would you like to name as your back-up decision maker? \"   Name: Sukh Hunter        Relationship: child          Phone number: [phone]  Srikanth Peeks this person be reached easily? \" Yes    Note: If the relationship of these Decision-Makers to the patient does NOT follow your state's Next of Kin hierarchy, recommend that patient complete ACP document that meets state-specific requirements to allow them to act on the patient's behalf when appropriate. Care Preferences    Ventilation: \"If you were in your present state of health and suddenly became very ill and were unable to breathe on your own, what would your preference be about the use of a ventilator (breathing machine) if it were available to you? \"      Would the patient desire the use of ventilator (breathing machine)?: no    \"If your health worsens and it becomes clear that your chance of recovery is unlikely, what would your preference be about the use of a ventilator (breathing machine) if it were available to you? \"     Would the patient desire the use of ventilator (breathing machine)?: No      Resuscitation  \"CPR works best to restart the heart when there is a sudden event, like a heart attack, in someone who is otherwise healthy. Unfortunately, CPR does not typically restart the heart for people who have serious health conditions or who are very sick. \"    \"In the event your heart stopped as a result of an underlying serious health condition, would you want attempts to be made to restart your heart (answer \"yes\" for attempt to resuscitate) or would you prefer a natural death (answer \"no\" for do not attempt to resuscitate)? \" no      NOTE: If the patient has a valid advance directive AND now provides care preference(s) that are inconsistent with that prior directive, advise the patient to consider either: creating a new advance directive that complies with state-specific requirements; or, if that is not possible, orally revoking that prior directive in accordance with state-specific requirements, which must be documented in the EHR. [] Yes   [x] No   Educated Patient / Tia Getting regarding differences between Advance Directives and portable DNR orders.     Length of ACP Conversation in minutes:      Conversation Outcomes:  [x] ACP discussion completed  [] Existing advance directive reviewed with patient; no changes to patient's previously recorded wishes  [] New Advance Directive completed  [] Portable Do Not Rescitate prepared for Provider review and signature  [x] POLST/POST/MOLST/MOST prepared for Provider review and signature      Follow-up plan:    [] Schedule follow-up conversation to continue planning  [] Referred individual to Provider for additional questions/concerns   [x] Advised patient/agent/surrogate to review completed ACP document and update if needed with changes in condition, patient preferences or care setting    [] This note routed to one or more involved healthcare providers

## 2020-11-09 NOTE — FLOWSHEET NOTE
11/09/20 0115   Vital Signs   Temp 99.6 °F (37.6 °C)   Temp Source Oral   Pulse 104   Heart Rate Source Monitor   Resp 16   /78   BP Location Left upper arm   BP Upper/Lower Upper

## 2020-11-09 NOTE — PROGRESS NOTES
Labs drawn. Changed position. Tolerated clear liquids. Continues dark tarry stools. Dulcolox suppository given.

## 2020-11-09 NOTE — ED TRIAGE NOTES
Pt states she has been vomiting for two or three days. Her stomach hurts when she vomits. Stomach is distended. Pt unsure if she has had a BM. She stated she didn't want her heart to be started back if it stopped. . Pt knew it was fall, where she was and what brought her here.

## 2020-11-09 NOTE — FLOWSHEET NOTE
11/09/20 0900   Assessment   Charting Type Shift assessment   Neurological   Neuro (WDL) WDL   Orientation Level Oriented to person   Cognition Follows commands   [place] Coma Scale   Eye Opening 4   Best Verbal Response 5   Best Motor Response 6   [place] Coma Scale Score 15   HEENT   HEENT (WDL) X   Teeth Missing teeth   Left Eye Blind   Right Eye Blind   Respiratory   Respiratory (WDL) WDL   Respiratory Pattern Regular   Respiratory Depth Normal   Respiratory Quality/Effort Unlabored   Chest Assessment Chest expansion symmetrical   Breath Sounds   Right Upper Lobe Clear   Right Middle Lobe Diminished   Right Lower Lobe Diminished   Left Upper Lobe Clear   Left Lower Lobe Diminished   Cardiac   Cardiac (WDL) X  (HX OF AFIB)   Cardiac Rhythm NSR   Cardiac Monitor   Telemetry Monitor On Yes   Telemetry Audible Yes   Telemetry Alarms Set Yes   Telemetry Box Number [id number]   Bedside Cardiac Monitor On Yes   Bedside Cardiac Audible Yes   Bedside Cardiac Alarms Set Yes   Gastrointestinal   Abdominal (WDL) X   RUQ Bowel Sounds Active   LUQ Bowel Sounds Active   RLQ Bowel Sounds Active   LLQ Bowel Sounds Active   Abdomen Inspection Distended; Soft   Tenderness Tenderness   GI Symptoms Bloating;Constipation;Distention;Nausea;Vomiting   Peripheral Vascular   Peripheral Vascular (WDL) WDL   Edema None   Skin Integrity   Skin Integrity (WDL) X   Skin Integrity Redness   Location sacrum   Preventative Dressing No   Assessed this shift Dry   Musculoskeletal   Musculoskeletal (WDL) X   RL Extremity Contracture   LL Extremity Contracture   Genitourinary   Genitourinary (WDL) X  (INCONTINENT)   Anus/Rectum   Anus/Rectum (WDL) WDL   Psychosocial   Psychosocial (WDL) WDL   incontinent of tarry stool. Cleaned up and repositioned. Oriented to name.

## 2020-11-09 NOTE — PLAN OF CARE
Problem: Nausea/Vomiting:  Goal: Absence of nausea/vomiting  Description: Absence of nausea/vomiting  Outcome: Ongoing     Problem:  Bowel/Gastric:  Goal: Ability to achieve a regular elimination pattern will improve  Description: Ability to achieve a regular elimination pattern will improve  Outcome: Ongoing

## 2020-11-09 NOTE — H&P
History and Physical    Patient:  Britta Campos    CHIEF COMPLAINT:    Coffee-ground emesis and abdominal pain    HISTORY OF PRESENT ILLNESS:   The patient is a 66 y.o. female long-term female resident of [address] [place] [address] with past medical history of chronic iron deficiency anemia, GI bleed, GERD, blindness, chronic back pain, dementia, depression, diabetes mellitus type 2, hypertension, hyperlipidemia, insomnia and recent COVID-19 who presented to the emergency department via EMS for multiple episodes of coffee-ground emesis and abdominal pain. Per report, symptoms started 3-4 days ago. She was receiving Zofran intermittently at the nursing home with some improvement. Yesterday, emesis noted to be coffee-ground in nature and patient was transferred for further evaluation. No hematochezia or melena reported. Patient has a history of upper GI bleeds and was previously evaluated with endoscopies that failed to identify any active bleeding. Given results of prior endoscopies, patient's family declines any additional scopes. Patient was admitted to [address] [place] [address] on 10/10-10/16 for hematemesis and COVID-19. She did not require blood transfusion during that admission. She was treated for Covid with antibiotics and remdesivir. Patient underwent routine evaluation in the emergency department. DEMOND globin 12.6. BUN 36 creatinine 0.7. Electrolytes within normal limits. COVID-19 positive. Chest x-ray with hypoventilatory lungs. Bibasilar opacities are favored to represent atelectasis. No significant pleural effusions or pneumothorax. CT abdomen pelvis with similar-appearing large amount of residual fecal material in sigmoid colon and rectal.  Possibility of fecal impaction cannot be excluded. Mild rectal wall thickening with perirectal fat stranding suggestive of stercoral colitis. Mildly progressed diffuse gaseous distention of colon. Bilateral nonobstructive nephrolithiasis.  No hydronephrosis. Bilateral small pleural effusion with bibasilar mild atelectasis. Pt admitted for further care. Hgb this morning down to 9.7. Past Medical History:      Diagnosis Date    Anemia     Anxiety     Blind     Narayan Bonnet syndrome     Chronic back pain     Constipation     [facility]     Depression     Diabetes mellitus ([facility]     Dysphagia     GERD (gastroesophageal reflux disease)     GI bleed     History of opioid abuse ([facility]     Hyperlipidemia     Hypertension     Insomnia     Interstitial cystitis     Retinitis pigmentosa     UTI (urinary tract infection)     Vitamin D deficiency     Vitamin deficiency        Past Surgical History:      Procedure Laterality Date    APPENDECTOMY      BACK SURGERY      x2    BLADDER SURGERY      multiple bladder surgeries    BREAST SURGERY      lump removed    CARPAL TUNNEL RELEASE Bilateral     CHOLECYSTECTOMY      COLONOSCOPY N/A 7/8/2019    COLONOSCOPY POLYPECTOMY SNARE/COLD BIOPSY performed by Petros Nelson MD at [address] GASTROINTESTINAL ENDOSCOPY N/A 7/8/2019    EGD BIOPSY performed by Petros Nelson MD at [address] [place] [address] ENDOSCOPY       Medications Prior to Admission:    Prior to Admission medications    Medication Sig Start Date End Date Taking? Authorizing Provider   morphine (MS CONTIN) 15 MG extended release tablet Take 15 mg by mouth 2 times daily.    Yes Historical Provider, MD   simvastatin (ZOCOR) 40 MG tablet Take 40 mg by mouth nightly   Yes Historical Provider, MD   NIFEdipine (ADALAT CC) 60 MG extended release tablet Take 1 tablet by mouth daily 10/17/20  Yes Monica Latham MD   magnesium oxide (MAG-OX) 400 (241.3 Mg) MG TABS tablet Take 1 tablet by mouth daily 10/17/20  Yes Monica Latham MD   zinc sulfate (ZINCATE) 220 (50 Zn) MG capsule Take 1 capsule by mouth daily 10/17/20  Yes Monica Latham MD   Cholecalciferol (VITAMIN D) 25 MCG TABS Take 1 tablet by mouth daily 10/17/20  Yes Lonnie Nguyen MD   glycerin moisturizing mouth spray (OASIS) 35 % LIQD Take 2 sprays by mouth 4 times daily   Yes Historical Provider, MD   ferrous sulfate (IRON 325) 325 (65 Fe) MG tablet Take 1 tablet by mouth 3 times daily (with meals) 3/29/20  Yes FLASH Arce   ondansetron (ZOFRAN) 4 MG tablet Take 4 mg by mouth every 6 hours as needed for Nausea or Vomiting   Yes Historical Provider, MD   cyclobenzaprine (FLEXERIL) 5 MG tablet Take 5 mg by mouth daily   Yes Historical Provider, MD   topiramate (TOPAMAX) 25 MG tablet Take 25 mg by mouth 2 times daily   Yes Historical Provider, MD   pantoprazole sodium (PROTONIX) 40 MG PACK packet Take 40 mg by mouth 2 times daily (before meals)   Yes Historical Provider, MD   polycarbophil (FIBERCON) 625 MG tablet Take 625 mg by mouth 2 times daily    Yes Historical Provider, MD   venlafaxine (EFFEXOR) 100 MG tablet Take 200 mg by mouth every morning    Yes Historical Provider, MD   sucralfate (CARAFATE) 1 GM tablet Take 1 tablet by mouth 4 times daily 7/4/19  Yes FLASH Arce   cyproheptadine (PERIACTIN) 4 MG tablet Take 8 mg by mouth 3 times daily    Yes Historical Provider, MD   Multiple Vitamins-Minerals (THERA M PLUS) TABS Take 1 each by mouth daily   Yes Historical Provider, MD   donepezil (ARICEPT) 10 MG tablet Take 10 mg by mouth nightly   Yes Historical Provider, MD   magnesium hydroxide (MILK OF MAGNESIA) 400 MG/5ML suspension Take 2,400 mg by mouth once as needed (if no bowel movement in 3 days (unless contraindicated))     Historical Provider, MD   senna (SENOKOT) 8.6 MG tablet Take 2 tablets by mouth daily as needed for Constipation     Historical Provider, MD   bisacodyl (DULCOLAX) 10 MG suppository Place 10 mg rectally daily as needed for Constipation Use if no bowel movement in 4 days    Historical Provider, MD   acetaminophen (TYLENOL) 650 MG suppository Place 650 mg rectally every 6 hours as needed for Fever or Pain Historical Provider, MD       Allergies:  Aspirin; Chlorpromazine; and Ibuprofen    Social History:   TOBACCO:   reports that she has never smoked. She has never used smokeless tobacco.  ETOH:   reports no history of alcohol use. OCCUPATION:  None     Family History:       Problem Relation Age of Onset    Heart Attack Mother     Heart Attack Father        Review of system  Constitutional:  Denies fever or chills. Positive for generalized weakness. Eyes:  Positive for blindness  HENT:  Denies nasal congestion or sore throat   Respiratory:  Denies cough or shortness of breath   Cardiovascular:  Denies chest pain or edema   GI:  Denies bloody stools or diarrhea. Positive for abdominal pain, nausea, vomiting and coffee ground emesis  :  Denies dysuria or frequency  Musculoskeletal:  Denies acute neck pain or body aches  Integument:  Denies rash or itching  Neurologic:  Denies headache, dizziness, numbness, tingling or unilateral weakness  Psychiatric:  Denies acute depression or acute anxiety      Vital Signs  Temp: 98.1 °F (36.7 °C)  Pulse: 70  Resp: 21  BP: 138/70  SpO2: 93 %  O2 Device: Nasal cannula       vital signs reviewed in electronic chart. Physical exam  Constitutional:  Well developed, thin and frail elderly lady lying in bed in no acute distress on 3 L nasal cannula.   Eyes: Blind in both eyes.  Keeps eyelids closed most of the time. HENT:  Atraumatic, external ears normal, external nose/nares normal, oropharynx moist, no pharyngeal exudates. Neck:  Supple. No JVD or thyromegaly. Respiratory:  No respiratory distress, normal breath sounds, no rales, no wheezing. On NC. Cardiovascular:  Normal rate, normal rhythm, no murmurs, no gallops, no rubs. GI:  Soft, nondistended, normal bowel sounds, nontender, no organomegaly, no mass. :  No costovertebral angle tenderness. Musculoskeletal:  No edema, no tenderness, no obvious deformities. Patient is moving all extremities.    Integument:  Well hydrated, no rash. Lymphatic:  No cervical or axillary lymphadenopathy noted. Neurologic:  Alert & oriented x 3,  no focal deficits noted. Strength is equal throughout. Psychiatric:  Speech and behavior appropriate. Lab Results   Component Value Date     11/09/2020    K 3.0 (L) 11/09/2020     11/09/2020    CO2 25 11/09/2020    BUN 25 (H) 11/09/2020    CREATININE <0.5 11/09/2020    GLUCOSE 110 (H) 11/09/2020    CALCIUM 8.4 (L) 11/09/2020    PROT 8.2 11/08/2020    LABALBU 4.3 11/08/2020    BILITOT 0.4 11/08/2020    ALKPHOS 264 (H) 11/08/2020    AST 23 11/08/2020    ALT 19 11/08/2020    LABGLOM >60 11/09/2020    GFRAA >59 11/09/2020    AGRATIO 1.1 11/08/2020    GLOB 3.9 11/08/2020           Lab Results   Component Value Date    WBC 5.3 11/09/2020    HGB 9.7 (L) 11/09/2020    HCT 32.3 (L) 11/09/2020    MCV 88.3 11/09/2020     11/09/2020       PA/lat CXR:   XR CHEST PORTABLE   Final Result     Hypoventilatory lungs. Bibasilar opacities are favored to represent    atelectasis. No significant pleural effusions or pneumothorax. CT ABDOMEN PELVIS WO CONTRAST Additional Contrast? None   Final Result      1. Similar appearing large amount of residual fecal material in sigmoid colon and rectum. Possibility of fecal impaction cannot be excluded. Mild rectal wall thickening with perirectal fat stranding suggestive of stercoral colitis. 2. Mildly progressed diffuse gaseous distention of colon. 3. Bilateral nonobstructive nephrolithiasis. No hydronephrosis. 4. Bilateral small pleural effusion with bibasilar mild atelectasis. Assessment and Plan       Diagnosis    GI bleed [K92.2]  -several episodes of coffee ground emesis PTA  -hgb 12.6 on admission--> 9.7 today  -History of iron deficiency anemia requiring recurrent transfusions.  Per chart review and family report, patient has had multiple upper and lower endoscopies that failed to identify any source of bleeding.  Family and patient refused any further endoscopies.  -Continue PPI BID and Carafate       COVID-19 [U07.1]  -COVID-19 +10/11  -Patient is a longstanding resident of [address] [place] [address]  -treated with magnesium, vitamin C, zinc, remdesevir, dexamethasone, Rocephin and azithromycin during last admission 10/10-10/16  -Holding therapeutic anticoagulation secondary to GI bleed.  Patient and her  made aware it is contraindicated in her current state and both expressed understanding.  -Continue supplemental oxygen  -CODE STATUS DNR/DNI  -Droplet plus precautions       Colitis [K52.9]  -Patient with chronic fecal impaction and suspected stercoral colitis on previous CT abdomen pelvis.  Evidence of large stool burden as far back as May 2018 per chart review.  -Per CT abdomen/pelvis July 2019 patient with abnormally enlarged mesorectal lymph nodes and presence of rectal neoplasm could not be ruled out.  Patient and her  have declined further work-up for this.  -treated with flagyl and rocephin 10/10 - 10/16  -Continue bowel regimen with daily suppositories and oral laxatives, stool softeners       Elevated BUN [R79.9]  -Suspect related to GI bleed        Benign essential HTN [I10]  -Continue Procardia       History of dementia [Z86.59]  -Mild.  Continue Aricept.  Patient currently alert and oriented x3.       Other constipation [K59.09]  -Continue bowel regimen       Fecal impaction in rectum ([facility] [K56.41]  -Longstanding issue per chart review  -Continue bowel regimen       Anemia [D64.9]  -Longstanding history of iron deficiency anemia requiring transfusions  -EGD and colonoscopy in July 2019 with Dr. Sukumar Suárez with no source of bleeding identified at that time.  Admitted at [facility] for the same in October 2019 and underwent capsule endoscopy and multiple colonoscopies without evidence of bleeding.  Patient has been reports biopsies were negative for malignancy at [facility].   -Patient and her  have declined any further endoscopies  -Continue PPI twice daily and Carafate  -Received Venofer infusion x2 last admission; continue oral iron  -Monitor and transfuse for hemoglobin less than 7       Declining functional status [R53.81]  -Patient is a long-term resident of [address] [place] [address] she is bedbound and nonambulatory with severe contractures in her lower extremities. Ayaka Clark is also blind with history of mild dementia although alert and oriented x3 currently.  She requires assistance with all ADLs including eating and drinking.  Continue frequent oral care. -CODE STATUS DNR/DNI per patient request         Patient was seen and examined by Dr. Felice Sanchez and plan of care reviewed.       Yun Vaughn certifies per iZ3D regulation for 42 .15(a), that the patient may reasonably be expected to be discharged or transferred to a hospital within 96 hours after admission to [address] [place] [address]    Electronically signed by FLASH Dickerson on 11/9/2020 at 1:59 PM

## 2020-11-09 NOTE — ED PROVIDER NOTES
[address] [place] [address] Court  eMERGENCY dEPARTMENT eNCOUnter      Pt Name: Avani Humphrey  MRN: [id number]  [place]: 1945  Date of evaluation: 44/0/2315  Provider: Cristela Menjivar MD    CHIEF COMPLAINT       Chief Complaint   Patient presents with    Vomiting Blood         HISTORY OF PRESENT ILLNESS  (Location/Symptom, Timing/Onset, Context/Setting, Quality, Duration,Modifying Factors, Severity.)   Avani Humphrey is a 66 y.o. female who presents to the emergency department with coffee ground emesis and abdominal pain. Patient has reportedly been vomiting for 2-3 days. She has pain with vomiting. Her abdomen is more distended. She was given zofran at 4:30pm.      She is coming from [facility]. She has a h/o upper GI bleeds. Nursing notes were reviewed. REVIEW OF SYSTEMS    (2-9 systems for level 4, 10 or more for level 5)   ROS:  General:  No fevers, no chills, no weakness  Cardiovascular:  No chest pain, no palpitations  Respiratory:  No shortness of breath, no cough, nowheezing  Gastrointestinal:  + pain, no nausea, + vomiting, + coffee ground emesis; + distension; no diarrhea  Musculoskeletal:  No muscle pain, no joint pain  Skin:  No rash, no easy bruising  Neurologic:  No speech problems, no headache, no extremity numbness, no extremity  tingling, no extremity weakness  Psychiatric:  No anxiety  Genitourinary:  No dysuria, no hematuria    Except as noted above the remainder of the review of systems was reviewed and negative.        PAST MEDICAL HISTORY     Past Medical History:   Diagnosis Date    Anemia     Anxiety     Blind     Narayan Bonnet syndrome     Chronic back pain     Constipation     Dementia ([facility] [address]     Depression     Diabetes mellitus ([facility] [address] [facility]     Dysphagia     GERD (gastroesophageal reflux disease)     GI bleed     History of opioid abuse ([facility] [address]     Hyperlipidemia     Hypertension     Insomnia     Interstitial cystitis     Retinitis pigmentosa     UTI (urinary tract infection)     Vitamin D deficiency     Vitamin deficiency          SURGICAL HISTORY       Past Surgical History:   Procedure Laterality Date    APPENDECTOMY      BACK SURGERY      x2    BLADDER SURGERY      multiple bladder surgeries    BREAST SURGERY      lump removed    CARPAL TUNNEL RELEASE Bilateral     CHOLECYSTECTOMY      COLONOSCOPY N/A 7/8/2019    COLONOSCOPY POLYPECTOMY SNARE/COLD BIOPSY performed by Monet [facility]is [facility] at [facility]    UPPER GASTROINTESTINAL ENDOSCOPY N/A 7/8/2019    EGD BIOPSY performed by Monet Wallis MD at [address] [place] [address]       Previous Medications    ACETAMINOPHEN (TYLENOL) 650 MG SUPPOSITORY    Place 650 mg rectally every 6 hours as needed for Fever or Pain     BISACODYL (DULCOLAX) 10 MG SUPPOSITORY    Place 10 mg rectally daily as needed for Constipation Use if no bowel movement in 4 days    CHOLECALCIFEROL (VITAMIN D) 25 MCG TABS    Take 1 tablet by mouth daily    CYCLOBENZAPRINE (FLEXERIL) 5 MG TABLET    Take 5 mg by mouth daily    CYPROHEPTADINE (PERIACTIN) 4 MG TABLET    Take 8 mg by mouth 3 times daily     DONEPEZIL (ARICEPT) 10 MG TABLET    Take 10 mg by mouth nightly    FERROUS SULFATE (IRON 325) 325 (65 FE) MG TABLET    Take 1 tablet by mouth 3 times daily (with meals)    GLYCERIN MOISTURIZING MOUTH SPRAY (OASIS) 35 % LIQD    Take 2 sprays by mouth 4 times daily    MAGNESIUM HYDROXIDE (MILK OF MAGNESIA) 400 MG/5ML SUSPENSION    Take 2,400 mg by mouth once as needed (if no bowel movement in 3 days (unless contraindicated))     MAGNESIUM OXIDE (MAG-OX) 400 (241.3 MG) MG TABS TABLET    Take 1 tablet by mouth daily    MORPHINE (MS CONTIN) 15 MG EXTENDED RELEASE TABLET    Take 15 mg by mouth 2 times daily.     MULTIPLE VITAMINS-MINERALS (THERA M PLUS) TABS    Take 1 each by mouth daily    NIFEDIPINE (ADALAT CC) 60 MG EXTENDED RELEASE TABLET    Take 1 tablet by mouth daily ONDANSETRON (ZOFRAN) 4 MG TABLET    Take 4 mg by mouth every 6 hours as needed for Nausea or Vomiting    PANTOPRAZOLE SODIUM (PROTONIX) 40 MG PACK PACKET    Take 40 mg by mouth 2 times daily (before meals)    POLYCARBOPHIL (FIBERCON) 625 MG TABLET    Take 625 mg by mouth 2 times daily     SENNA (SENOKOT) 8.6 MG TABLET    Take 2 tablets by mouth daily as needed for Constipation     SIMVASTATIN (ZOCOR) 40 MG TABLET    Take 40 mg by mouth nightly    SUCRALFATE (CARAFATE) 1 GM TABLET    Take 1 tablet by mouth 4 times daily    TOPIRAMATE (TOPAMAX) 25 MG TABLET    Take 25 mg by mouth 2 times daily    VENLAFAXINE (EFFEXOR) 100 MG TABLET    Take 200 mg by mouth every morning     ZINC SULFATE (ZINCATE) 220 (50 ZN) MG CAPSULE    Take 1 capsule by mouth daily       ALLERGIES     Aspirin; Chlorpromazine; and Ibuprofen    FAMILY HISTORY     History reviewed. No pertinent family history.        SOCIAL HISTORY       Social History     Socioeconomic History    Marital status:      Spouse name: None    Number of children: None    Years of education: None    Highest education level: None   Occupational History    None   Social Needs    Financial resource strain: None    Food insecurity     Worry: None     Inability: None    Transportation needs     Medical: None     Non-medical: None   Tobacco Use    Smoking status: Never Smoker    Smokeless tobacco: Never Used   Substance and Sexual Activity    Alcohol use: No    Drug use: No    Sexual activity: None   Lifestyle    Physical activity     Days per week: None     Minutes per session: None    Stress: None   Relationships    Social connections     Talks on phone: None     Gets together: None     Attends Cheondoism service: None     Active member of club or organization: None     Attends meetings of clubs or organizations: None     Relationship status: None    Intimate partner violence     Fear of current or ex partner: None     Emotionally abused: None Physically abused: None     Forced sexual activity: None   Other Topics Concern    None   Social History Narrative    None         PHYSICAL EXAM    (up to 7 for level 4, 8 or more for level 5)     ED Triage Vitals [11/08/20 2128]   BP Temp Temp Source Pulse Resp SpO2 Height Weight   (!) 176/89 97.6 °F (36.4 °C) Tympanic 115 22 95 % 5' 3\" (1.6 m) 111 lb (50.3 kg)       Physical Exam  General :Patient is awake, alert, answers questions; clothes covered in coffee ground emesis  HEENT: Pupils are equally round and reactive to light, EOMI. Oral mucosa is dry, no exudate. No pharyngeal erythema. Neck: Neck is supple, full range of motion  Cardiac: Heart with tachycardic rate, regular rhythm, no murmurs, rubs, or gallops  Lungs: Lungs are clear to auscultation, there is no wheezing, rhonchi, or rales. Chest wall: There is no tenderness to palpation over the chest wall or over ribs  Abdomen: Abdomen is firm, diffusely tender and very distended. There is no firm or pulsatile masses, no rebound, rigidity but some mild voluntary guarding. Musculoskeletal: Extremities are contracted. No focal muscle deficits are appreciated  Back: No midline or bony tenderness. No CVAT. Neuro: Motor intact, sensory intact, level of consciousness is normal.  Dermatology: Skin is warm and dry  Psych: Mentation is grossly baseline for this patient,cognition is baseline. Affect is appropriate.       DIAGNOSTIC RESULTS     EKG: All EKG's are interpreted by [facility] who either signs or Co-signs this chart in the absence of a cardiologist.    Sinus tachycardia  Rate of 122  NSST changes  Prolonged Qtc at 491    RADIOLOGY:   Non-plain film images such as CT, Ultrasound and MRI are read by the radiologist. Plain radiographic images are visualized and preliminarily interpreted by the emergency physician with the below findings:      [x]Radiologist's Report Reviewed:  CT ABDOMEN PELVIS WO CONTRAST Additional Contrast? None   Final Result      1. Similar appearing large amount of residual fecal material in sigmoid colon and rectum. Possibility of fecal impaction cannot be excluded. Mild rectal wall thickening with perirectal fat stranding suggestive of stercoral colitis. 2. Mildly progressed diffuse gaseous distention of colon. 3. Bilateral nonobstructive nephrolithiasis. No hydronephrosis. 4. Bilateral small pleural effusion with bibasilar mild atelectasis.                   ED BEDSIDE ULTRASOUND:   Performed by ED Physician - none    LABS:  Labs Reviewed   CBC WITH AUTO DIFFERENTIAL - Abnormal; Notable for the following components:       Result Value    MCH 26.4 (*)     MCHC 30.7 (*)     MPV 10.6 (*)     Neutrophils Absolute 10.1 (*)     Lymphocytes Absolute 0.5 (*)     All other components within normal limits    Narrative:     Performed at:  [address] [place] [address] Laboratory  [address] [place] [address],  [place], [address] [address]   Phone [phone]   COMPREHENSIVE METABOLIC PANEL - Abnormal; Notable for the following components:    Sodium 134 (*)     Chloride 96 (*)     Glucose 187 (*)     BUN 36 (*)     Alkaline Phosphatase 264 (*)     All other components within normal limits    Narrative:     Performed at:  [address] [place] [address] Laboratory  [address] [place], [place] [address], [place]   Phone [phone]    Narrative:     Performed at:  [address] [place] [address] Laboratory  [address] [place], [place] [address] [address]   Phone [phone]   LIPASE    Narrative:     Performed at:  [facility] Laboratory  [address] [place], [place] [address]   Phone [phone]   TROPONIN    Narrative:     Performed at:  [address] [place] [address] Laboratory  [address] [place], [place] [address] [address]   Phone [phone]   TYPE AND SCREEN    Narrative:     Performed at:  [address] [place] [address] and [facility] Laboratory  [facility],  [place], [place]   Phone [phone]       I have reviewed and interpreted all ofthe currently available lab results from this visit (if applicable):  Results for orders placed or performed during the hospital encounter of 11/08/20   Brain Natriuretic Peptide   Result Value Ref Range    Pro- 0 - 1,800 pg/mL   CBC Auto Differential   Result Value Ref Range    WBC 10.9 4.0 - 11.0 K/uL    RBC 4.77 3.80 - 5.80 M/uL    Hemoglobin 12.6 11.5 - 16.5 g/dL    Hematocrit 41.1 37.0 - 47.0 %    MCV 86.2 80.0 - 100.0 fL    MCH 26.4 (L) 27.0 - 32.0 pg    MCHC 30.7 (L) 31.0 - 35.0 g/dL    RDW 14.7 11.0 - 16.0 %    Platelets 873 762 - 680 K/uL    MPV 10.6 (H) 6.0 - 10.0 fL    Neutrophils % 92.7 %    Immature Granulocytes % 0.3 0.0 - 5.0 %    Lymphocytes % 4.9 %    Monocytes % 1.9 %    Eosinophils % 0.0 %    Basophils % 0.2 %    Neutrophils Absolute 10.1 (H) 2.0 - 7.5 K/uL    Immature Granulocytes # 0.0 K/uL    Lymphocytes Absolute 0.5 (L) 1.5 - 4.0 K/uL    Monocytes Absolute 0.2 0.2 - 0.8 K/uL    Eosinophils Absolute 0.0 0.0 - 0.4 K/uL    Basophils Absolute 0.0 0.0 - 0.1 K/uL   Comprehensive Metabolic Panel   Result Value Ref Range    Sodium 134 (L) 136 - 145 mmol/L    Potassium 3.7 3.4 - 5.1 mmol/L    Chloride 96 (L) 98 - 107 mmol/L    CO2 24 20 - 30 mmol/L    Anion Gap 14 3 - 16    Glucose 187 (H) 74 - 106 mg/dL    BUN 36 (H) 6 - 20 mg/dL    CREATININE 0.7 0.4 - 1.2 mg/dL    GFR Non-African American >60 >59    GFR African American >59 >59    Calcium 10.2 8.5 - 10.5 mg/dL    Total Protein 8.2 6.4 - 8.3 g/dL    Alb 4.3 3.4 - 4.8 g/dL    Albumin/Globulin Ratio 1.1 0.8 - 2.0    Total Bilirubin 0.4 0.3 - 1.2 mg/dL    Alkaline Phosphatase 264 (H) 25 - 100 U/L    ALT 19 4 - 36 U/L    AST 23 8 - 33 U/L    Globulin 3.9 g/dL   Lipase   Result Value Ref Range    Lipase 16.0 5.6 - 51.3 U/L   Troponin   Result Value Ref Range    Troponin <0.30 <0.30 ng/mL   TYPE AND SCREEN Result Value Ref Range    ABO/Rh A POS     Antibody Screen NEG         All other labs were within normal range or not returned as of this dictation. EMERGENCY DEPARTMENT COURSE and DIFFERENTIAL DIAGNOSIS/MDM:   Vitals:  Vitals:    11/08/20 2128   BP: (!) 176/89   Pulse: 115   Resp: 22   Temp: 97.6 °F (36.4 °C)   TempSrc: Tympanic   SpO2: 95%   Weight: 111 lb (50.3 kg)   Height: 5' 3\" (1.6 m)       Attempted to place an NGT twice but the patient pulled it out. She had coffee ground emesis immediately afterwards. There was already coffee ground emesis on her gown, in her hair and on her face. Gastroccult was positive. Patient refused further attempts. [place] [address] for transfer. [address] does not have GI coverage   [facility] does not have any beds  [facility] is on divert   [facility] has a wait list more than 7 patients. [address] are full and unable to accept. 8400  Case discussed with Dr. Shira Ceja. He is willing to admit the patient here. CONSULTS:  None    PROCEDURES:  Procedures    CRITICAL CARE TIME    Total Critical Care time was 0 minutes, excluding separately reportable procedures. There was a high probability of clinically significant/life threatening deterioration in the patient's condition which required my urgent intervention. FINAL IMPRESSION      1. UGI bleed    2. Fecal impaction ([facility] [address]          DISPOSITION/PLAN   DISPOSITION      Admission    PATIENT REFERRED TO:  No follow-up provider specified. DISCHARGE MEDICATIONS:  New Prescriptions    No medications on file       Comment: Please note this report has been produced using speech recognition software and may contain errorsrelated to that system including errors in grammar, punctuation, and spelling, as well as words and phrases that may be inappropriate.  If there are any questions or concerns please feel free to contact the dictating providerfor clarification.     Barbara Bernard MD  Attending Emergency Physician                Barbara Bernard MD  11/09/20 8065

## 2020-11-09 NOTE — PROGRESS NOTES
Pt had Malnutrition screen of 5 but after looking at last 11 months, no recent significant weight loss. Pt is NPO at this time. Will start ONS at when pt diet advanced.

## 2020-11-09 NOTE — ED NOTES
Pt changed her mind and does want everything done to live. Pt did refuse a NG tube, attempted twice and she stopped  Us. Pt vomited emesis coffee grounds, checked and positive gastrocult.       Mariia Aranda RN  11/08/20 8005

## 2020-11-09 NOTE — ED NOTES
Patient is coming from [address] [place] [address] and rehab with c/o vomiting light ground emesis all day. Patient has a history of upper gi bleeds. Patient had received zofran at 36 today, abdomen is distended and c/o abdominal pain. Vital signs 110/72, heart rate 68, resp rate 20, oxygen sats 96 % on room air, temporal temperature of 97.9.      Keara Mitchell RN  11/08/20 1956

## 2020-11-10 VITALS
WEIGHT: 113.06 LBS | BODY MASS INDEX: 20.03 KG/M2 | DIASTOLIC BLOOD PRESSURE: 82 MMHG | SYSTOLIC BLOOD PRESSURE: 148 MMHG | HEIGHT: 63 IN | OXYGEN SATURATION: 96 % | TEMPERATURE: 98.1 F | RESPIRATION RATE: 25 BRPM | HEART RATE: 92 BPM

## 2020-11-10 LAB
ANION GAP SERPL CALCULATED.3IONS-SCNC: 9 MMOL/L (ref 3–16)
BUN BLDV-MCNC: 17 MG/DL (ref 6–20)
CALCIUM SERPL-MCNC: 8.5 MG/DL (ref 8.5–10.5)
CHLORIDE BLD-SCNC: 101 MMOL/L (ref 98–107)
CO2: 24 MMOL/L (ref 20–30)
CREAT SERPL-MCNC: <0.5 MG/DL (ref 0.4–1.2)
FERRITIN: 102.1 NG/ML (ref 22–322)
FOLATE: 16.54 NG/ML
GFR AFRICAN AMERICAN: >59
GFR NON-AFRICAN AMERICAN: >60
GLUCOSE BLD-MCNC: 95 MG/DL (ref 74–106)
HCT VFR BLD CALC: 33 % (ref 37–47)
HEMOGLOBIN: 10 G/DL (ref 11.5–16.5)
IRON SATURATION: 28 % (ref 15–50)
IRON: 70 UG/DL (ref 37–145)
MCH RBC QN AUTO: 26.6 PG (ref 27–32)
MCHC RBC AUTO-ENTMCNC: 30.3 G/DL (ref 31–35)
MCV RBC AUTO: 87.8 FL (ref 80–100)
PDW BLD-RTO: 14.6 % (ref 11–16)
PLATELET # BLD: 229 K/UL (ref 150–400)
PMV BLD AUTO: 10.3 FL (ref 6–10)
POTASSIUM SERPL-SCNC: 3.1 MMOL/L (ref 3.4–5.1)
RBC # BLD: 3.76 M/UL (ref 3.8–5.8)
SODIUM BLD-SCNC: 134 MMOL/L (ref 136–145)
TOTAL IRON BINDING CAPACITY: 246 UG/DL (ref 250–450)
VITAMIN B-12: 1116 PG/ML (ref 211–911)
WBC # BLD: 5.9 K/UL (ref 4–11)

## 2020-11-10 PROCEDURE — 83550 IRON BINDING TEST: CPT

## 2020-11-10 PROCEDURE — 6370000000 HC RX 637 (ALT 250 FOR IP): Performed by: INTERNAL MEDICINE

## 2020-11-10 PROCEDURE — 85027 COMPLETE CBC AUTOMATED: CPT

## 2020-11-10 PROCEDURE — 82607 VITAMIN B-12: CPT

## 2020-11-10 PROCEDURE — 83540 ASSAY OF IRON: CPT

## 2020-11-10 PROCEDURE — G0378 HOSPITAL OBSERVATION PER HR: HCPCS

## 2020-11-10 PROCEDURE — 36415 COLL VENOUS BLD VENIPUNCTURE: CPT

## 2020-11-10 PROCEDURE — 6370000000 HC RX 637 (ALT 250 FOR IP): Performed by: PHYSICIAN ASSISTANT

## 2020-11-10 PROCEDURE — 82746 ASSAY OF FOLIC ACID SERUM: CPT

## 2020-11-10 PROCEDURE — 82728 ASSAY OF FERRITIN: CPT

## 2020-11-10 PROCEDURE — 6360000002 HC RX W HCPCS: Performed by: INTERNAL MEDICINE

## 2020-11-10 PROCEDURE — 99238 HOSP IP/OBS DSCHRG MGMT 30/<: CPT | Performed by: INTERNAL MEDICINE

## 2020-11-10 PROCEDURE — 2700000000 HC OXYGEN THERAPY PER DAY

## 2020-11-10 PROCEDURE — 80048 BASIC METABOLIC PNL TOTAL CA: CPT

## 2020-11-10 PROCEDURE — C9113 INJ PANTOPRAZOLE SODIUM, VIA: HCPCS | Performed by: INTERNAL MEDICINE

## 2020-11-10 PROCEDURE — 2580000003 HC RX 258: Performed by: INTERNAL MEDICINE

## 2020-11-10 PROCEDURE — 96376 TX/PRO/DX INJ SAME DRUG ADON: CPT

## 2020-11-10 RX ORDER — POTASSIUM CHLORIDE 20 MEQ/1
40 TABLET, EXTENDED RELEASE ORAL ONCE
Status: COMPLETED | OUTPATIENT
Start: 2020-11-10 | End: 2020-11-10

## 2020-11-10 RX ORDER — MORPHINE SULFATE 15 MG/1
15 TABLET, FILM COATED, EXTENDED RELEASE ORAL 2 TIMES DAILY
Qty: 14 TABLET | Refills: 0 | Status: SHIPPED | OUTPATIENT
Start: 2020-11-10 | End: 2020-11-17

## 2020-11-10 RX ADMIN — SUCRALFATE 1 G: 1 TABLET ORAL at 09:01

## 2020-11-10 RX ADMIN — MAGNESIUM OXIDE TAB 400 MG (241.3 MG ELEMENTAL MG) 400 MG: 400 (241.3 MG) TAB at 09:01

## 2020-11-10 RX ADMIN — SODIUM CHLORIDE 8 MG/HR: 9 INJECTION, SOLUTION INTRAVENOUS at 08:59

## 2020-11-10 RX ADMIN — MORPHINE SULFATE 15 MG: 15 TABLET, FILM COATED, EXTENDED RELEASE ORAL at 09:00

## 2020-11-10 RX ADMIN — MULTIPLE VITAMINS W/ MINERALS TAB 1 TABLET: TAB at 09:01

## 2020-11-10 RX ADMIN — ZINC SULFATE 220 MG (50 MG) CAPSULE 50 MG: CAPSULE at 09:01

## 2020-11-10 RX ADMIN — ATORVASTATIN CALCIUM 10 MG: 10 TABLET, FILM COATED ORAL at 09:01

## 2020-11-10 RX ADMIN — SODIUM CHLORIDE 8 MG/HR: 9 INJECTION, SOLUTION INTRAVENOUS at 00:18

## 2020-11-10 RX ADMIN — TOPIRAMATE 25 MG: 25 TABLET, FILM COATED ORAL at 09:00

## 2020-11-10 RX ADMIN — VENLAFAXINE 100 MG: 75 TABLET ORAL at 09:00

## 2020-11-10 RX ADMIN — FERROUS SULFATE TAB EC 324 MG (65 MG FE EQUIVALENT) 324 MG: 324 (65 FE) TABLET DELAYED RESPONSE at 09:00

## 2020-11-10 RX ADMIN — POTASSIUM CHLORIDE 40 MEQ: 1500 TABLET, EXTENDED RELEASE ORAL at 09:05

## 2020-11-10 RX ADMIN — Medication 1000 UNITS: at 09:00

## 2020-11-10 ASSESSMENT — PAIN SCALES - GENERAL: PAINLEVEL_OUTOF10: 8

## 2020-11-10 NOTE — DISCHARGE SUMMARY
12.6 on 11/8. CT abdomen pelvis obtained showing similar appearing large amount of residual fecal material in sigmoid colon and rectum. Possibility of fecal impaction cannot be excluded. Mild rectal wall thickening with perirectal fat stranding suggestive of stercoral colitis. Mildly progressed diffuse gaseous distention of colon. Bilateral nonobstructive nephrolithiasis. No hydronephrosis. Bilateral small pleural effusion with bibasilar mild atelectasis. Hemoglobin was trended. In the a.m. on 11/9 hemoglobin had dropped to 9.7. CBC repeated 6 hours later with hemoglobin 10.2. Hemoglobin on 11/10 was 10.0. Patient had no further episodes of hematic emesis. Patient's case discussed with her and her  Ladmariann Shook. They declined further evaluation with endoscopies since she had had several endoscopies in the past that did not reveal active GI bleeding. Given patient's stable hemoglobin and resolution of coffee-ground emesis, she will be transferred back to nursing home today. Suspect coffee-ground emesis was secondary to a Lewis-Grajeda tear. Hospital Course:        Diagnosis    GI bleed [K92.2]  -several episodes of coffee ground emesis PTA  -hgb 12.6 on admission--> 9.7 on 11/9, then 10.2 6 hours later.  Hgb 10.0 on 11/10.  -History of iron deficiency anemia requiring recurrent transfusions.  Per chart review and family report, patient has had multiple upper and lower endoscopies that failed to identify any source of bleeding.  Family and patient refused any further endoscopies.  -Continue PPI BID and Carafate  -no further episodes of coffee ground emesis  -suspect secondary to lewis-grajeda tear       COVID-19 [U07.1]  -COVID-19 +10/11  -Patient is a longstanding resident of [address] [place] [address]  -treated with magnesium, vitamin C, zinc, remdesevir, dexamethasone, Rocephin and azithromycin during last admission 10/10-10/16  -Holding therapeutic anticoagulation secondary to GI bleed.  Patient and her  made aware it is contraindicated in her current state and both expressed understanding.  -Continue supplemental oxygen  -CODE STATUS DNR/DNI  -Droplet plus precautions       Colitis [K52.9]  -Patient with chronic fecal impaction and suspected stercoral colitis on previous CT abdomen pelvis.  Evidence of large stool burden as far back as May 2018 per chart review.  -Per CT abdomen/pelvis July 2019 patient with abnormally enlarged mesorectal lymph nodes and presence of rectal neoplasm could not be ruled out.  Patient and her  have declined further work-up for this.  -treated with flagyl and rocephin 10/10 - 10/16  -Continue bowel regimen with daily suppositories and oral laxatives, stool softeners       Elevated BUN [R79.9]  -Suspect related to GI bleed at time of admission  -BUN wnl on 11/10       Benign essential HTN [I10]  -Continue Procardia       History of dementia [Z86.59]  -Mild.  Continue Aricept.  Patient currently alert and oriented x3.       Other constipation [K59.09]  -Continue bowel regimen       Fecal impaction in rectum ([facility] [address] [K56.41]  -Longstanding issue per chart review  -Continue bowel regimen       Anemia [D64.9]  -Longstanding history of iron deficiency anemia requiring transfusions  -EGD and colonoscopy in July 2019 with Dr. Kenyon Evans with no source of bleeding identified at that time.  Admitted at [facility] for the same in October 2019 and underwent capsule endoscopy and multiple colonoscopies without evidence of bleeding.  Patient has been reports biopsies were negative for malignancy at [facility].   -Patient and her  have declined any further endoscopies  -Continue PPI twice daily and Carafate  -Received Venofer infusion x2 last admission; continue oral iron  -Monitor and transfuse for hemoglobin less than 7       Declining functional status [R53.81]  -Patient is a long-term resident of [address] [place] [address] she is bedbound and nonambulatory with severe contractures in her lower extremities.  She is also blind with history of mild dementia although alert and oriented x3 currently.  She requires assistance with all ADLs including eating and drinking.  Continue frequent oral care. -CODE STATUS DNR/DNI per patient request         Disposition: SNF    Discharged Condition: Stable    Vital Signs  Temp: 98.1 °F (36.7 °C)  Pulse: 92  Resp: 25  BP: (!) 148/82  SpO2: 96 %  O2 Device: None (Room air)  O2 Flow Rate (L/min): 2 L/min    Vital signs reviewed in electronic chart. Physical exam  Constitutional:  Well developed, thin and frail elderly lady lying in bed in no acute distress on 3 L nasal cannula.   Eyes: Blind in both eyes.  Keeps eyelids closed most of the time. HENT:  Atraumatic, external ears normal, external nose/nares normal, oropharynx moist, no pharyngeal exudates. Neck:  Supple. No JVD or thyromegaly. Respiratory:  No respiratory distress, normal breath sounds, no rales, no wheezing. On NC. Cardiovascular:  Normal rate, normal rhythm, no murmurs, no gallops, no rubs. GI:  Soft, nondistended, normal bowel sounds, nontender, no organomegaly, no mass. :  No costovertebral angle tenderness. Musculoskeletal:  No edema, no tenderness, no obvious deformities. Patient is moving all extremities. Integument:  Well hydrated, no rash. Lymphatic:  No cervical or axillary lymphadenopathy noted. Neurologic:  Alert & oriented x 3,  no focal deficits noted. Strength is equal throughout. Psychiatric:  Speech and behavior appropriate. Activity: activity as tolerated  Diet: regular diet  Follow Up: Primary Care Physician in 1 week    Labs:  For convenience and continuity at follow-up the following most recent labs are provided:    CBC:   Lab Results   Component Value Date    WBC 5.9 11/10/2020    HGB 10.0 11/10/2020    HCT 33.0 11/10/2020     11/10/2020       RENAL:   Lab Results   Component Value Date     11/10/2020    K 3.1 11/10/2020    K 3.3 10/16/2020     11/10/2020    CO2 24 11/10/2020    BUN 17 11/10/2020    CREATININE <0.5 11/10/2020         Discharge Medications:     Current Discharge Medication List           Details   morphine (MS CONTIN) 15 MG extended release tablet Take 1 tablet by mouth 2 times daily for 7 days. Qty: 14 tablet, Refills: 0    Comments: Reduce doses taken as pain becomes manageable  Associated Diagnoses: Chronic back pain, unspecified back location, unspecified back pain laterality              Details   simvastatin (ZOCOR) 40 MG tablet Take 40 mg by mouth nightly      NIFEdipine (ADALAT CC) 60 MG extended release tablet Take 1 tablet by mouth daily  Qty: 30 tablet, Refills: 3      magnesium oxide (MAG-OX) 400 (241.3 Mg) MG TABS tablet Take 1 tablet by mouth daily  Qty: 30 tablet, Refills: 3      zinc sulfate (ZINCATE) 220 (50 Zn) MG capsule Take 1 capsule by mouth daily  Qty: 30 capsule, Refills: 3      Cholecalciferol (VITAMIN D) 25 MCG TABS Take 1 tablet by mouth daily  Qty: 30 tablet, Refills: 3    Comments: Labeling may look different. 25 mcg=1000 Units. Please double check dosages.       glycerin moisturizing mouth spray (OASIS) 35 % LIQD Take 2 sprays by mouth 4 times daily      ferrous sulfate (IRON 325) 325 (65 Fe) MG tablet Take 1 tablet by mouth 3 times daily (with meals)  Qty: 60 tablet, Refills: 3      ondansetron (ZOFRAN) 4 MG tablet Take 4 mg by mouth every 6 hours as needed for Nausea or Vomiting      cyclobenzaprine (FLEXERIL) 5 MG tablet Take 5 mg by mouth daily      topiramate (TOPAMAX) 25 MG tablet Take 25 mg by mouth 2 times daily      pantoprazole sodium (PROTONIX) 40 MG PACK packet Take 40 mg by mouth 2 times daily (before meals)      polycarbophil (FIBERCON) 625 MG tablet Take 625 mg by mouth 2 times daily       venlafaxine (EFFEXOR) 100 MG tablet Take 200 mg by mouth every morning       sucralfate (CARAFATE) 1 GM tablet Take 1 tablet by mouth 4 times daily  Qty: 120 tablet, Refills: 0 cyproheptadine (PERIACTIN) 4 MG tablet Take 8 mg by mouth 3 times daily       Multiple Vitamins-Minerals (THERA M PLUS) TABS Take 1 each by mouth daily      donepezil (ARICEPT) 10 MG tablet Take 10 mg by mouth nightly      magnesium hydroxide (MILK OF MAGNESIA) 400 MG/5ML suspension Take 2,400 mg by mouth once as needed (if no bowel movement in 3 days (unless contraindicated))       senna (SENOKOT) 8.6 MG tablet Take 2 tablets by mouth daily as needed for Constipation       bisacodyl (DULCOLAX) 10 MG suppository Place 10 mg rectally daily as needed for Constipation Use if no bowel movement in 4 days      acetaminophen (TYLENOL) 650 MG suppository Place 650 mg rectally every 6 hours as needed for Fever or Pain               Patient was seen and examined by Dr. Aliya Beltrán and plan of care reviewed. Signed:  Electronically signed by FLASH Campbell on 11/10/2020 at 10:48 AM       Thank you Greg Sanz MD for the opportunity to be involved in this patient's care. If you have any questions or concerns please feel free to contact me at [phone].

## 2020-11-10 NOTE — FLOWSHEET NOTE
11/09/20 2015   Assessment   Charting Type Shift assessment   Neurological   Neuro (WDL) WDL   Orientation Level Oriented to person   Cognition Follows commands   Hamzah Coma Scale   Eye Opening 4   Best Verbal Response 5   Best Motor Response 6   [place] Coma Scale Score 15   HEENT   HEENT (WDL) X   Teeth Missing teeth   Left Eye Blind   Right Eye Blind   Respiratory   Respiratory (WDL) WDL   Respiratory Pattern Regular   Respiratory Depth Normal   Respiratory Quality/Effort Unlabored   Chest Assessment Chest expansion symmetrical   Breath Sounds   Right Upper Lobe Clear   Right Middle Lobe Diminished   Right Lower Lobe Diminished   Left Upper Lobe Clear   Left Lower Lobe Diminished   Cardiac   Cardiac (WDL) X  (HX OF AFIB)   Cardiac Rhythm NSR   Cardiac Monitor   Telemetry Monitor On Yes   Telemetry Audible Yes   Telemetry Alarms Set Yes   Telemetry Box Number [id number]   Gastrointestinal   Abdominal (WDL) X   RUQ Bowel Sounds Active   LUQ Bowel Sounds Active   RLQ Bowel Sounds Active   LLQ Bowel Sounds Active   Abdomen Inspection Distended; Soft   Tenderness Tenderness   GI Symptoms Bloating;Constipation;Distention;Nausea;Vomiting   Peripheral Vascular   Peripheral Vascular (WDL) WDL   Edema None   Skin Integrity   Skin Integrity (WDL) X   Skin Integrity Redness   Location Sacrum   Preventative Dressing No   Musculoskeletal   Musculoskeletal (WDL) X   RL Extremity Contracture   LL Extremity Contracture   Genitourinary   Genitourinary (WDL) X  (INCONTINENT)   Anus/Rectum   Anus/Rectum (WDL) WDL   Psychosocial   Psychosocial (WDL) WDL

## 2020-11-10 NOTE — PROGRESS NOTES
RN attempting to contact NH for discharge summary. NH fax lines are all \"unanswered\" Will continue to try to send.

## 2020-12-03 ENCOUNTER — APPOINTMENT (OUTPATIENT)
Dept: GENERAL RADIOLOGY | Facility: HOSPITAL | Age: 75
End: 2020-12-03
Payer: MEDICARE

## 2020-12-03 ENCOUNTER — HOSPITAL ENCOUNTER (EMERGENCY)
Facility: HOSPITAL | Age: 75
Discharge: HOME | End: 2020-12-04
Attending: EMERGENCY MEDICINE
Payer: MEDICARE

## 2020-12-03 LAB
ANION GAP SERPL CALCULATED.3IONS-SCNC: 18 MMOL/L (ref 3–16)
APTT: 52.9 SEC (ref 21.6–33.4)
BACTERIA: ABNORMAL /HPF
BASOPHILS ABSOLUTE: 0 K/UL (ref 0–0.1)
BASOPHILS RELATIVE PERCENT: 0.3 %
BILIRUBIN URINE: ABNORMAL
BLOOD, URINE: ABNORMAL
BUN BLDV-MCNC: 46 MG/DL (ref 6–20)
CALCIUM SERPL-MCNC: 10.8 MG/DL (ref 8.5–10.5)
CHLORIDE BLD-SCNC: 96 MMOL/L (ref 98–107)
CLARITY: ABNORMAL
CO2: 22 MMOL/L (ref 20–30)
COARSE CASTS, UA: ABNORMAL /LPF (ref 0–2)
COLOR: ABNORMAL
CREAT SERPL-MCNC: 1.1 MG/DL (ref 0.4–1.2)
CRYSTALS, UA: ABNORMAL /HPF
EOSINOPHILS ABSOLUTE: 0 K/UL (ref 0–0.4)
EOSINOPHILS RELATIVE PERCENT: 0 %
EPITHELIAL CELLS, UA: ABNORMAL /HPF (ref 0–5)
GFR AFRICAN AMERICAN: 58
GFR NON-AFRICAN AMERICAN: 48
GLUCOSE BLD-MCNC: 218 MG/DL (ref 74–106)
GLUCOSE URINE: NEGATIVE MG/DL
HCT VFR BLD CALC: 46.4 % (ref 37–47)
HEMOGLOBIN: 14.6 G/DL (ref 11.5–16.5)
IMMATURE GRANULOCYTES #: 0 K/UL
IMMATURE GRANULOCYTES %: 0.3 % (ref 0–5)
INR BLD: 1.38 (ref 0.87–1.14)
KETONES, URINE: ABNORMAL MG/DL
LEUKOCYTE ESTERASE, URINE: NEGATIVE
LYMPHOCYTES ABSOLUTE: 0.9 K/UL (ref 1.5–4)
LYMPHOCYTES RELATIVE PERCENT: 7.9 %
MAGNESIUM: 2.7 MG/DL (ref 1.7–2.4)
MCH RBC QN AUTO: 26.5 PG (ref 27–32)
MCHC RBC AUTO-ENTMCNC: 31.5 G/DL (ref 31–35)
MCV RBC AUTO: 84.2 FL (ref 80–100)
MICROSCOPIC EXAMINATION: YES
MONOCYTES ABSOLUTE: 1.4 K/UL (ref 0.2–0.8)
MONOCYTES RELATIVE PERCENT: 12.7 %
MUCUS: ABNORMAL /LPF
NEUTROPHILS ABSOLUTE: 8.6 K/UL (ref 2–7.5)
NEUTROPHILS RELATIVE PERCENT: 78.8 %
NITRITE, URINE: NEGATIVE
OCCULT BLOOD DIAGNOSTIC: ABNORMAL
PDW BLD-RTO: 14.9 % (ref 11–16)
PH UA: 5 (ref 5–8)
PLATELET # BLD: 425 K/UL (ref 150–400)
PMV BLD AUTO: 11.3 FL (ref 6–10)
POTASSIUM SERPL-SCNC: 4.1 MMOL/L (ref 3.4–5.1)
PROTEIN UA: 100 MG/DL
PROTHROMBIN TIME: 17 SEC (ref 11.7–14.6)
RBC # BLD: 5.51 M/UL (ref 3.8–5.8)
RBC UA: ABNORMAL /HPF (ref 0–4)
SODIUM BLD-SCNC: 136 MMOL/L (ref 136–145)
SPECIFIC GRAVITY UA: 1.02 (ref 1–1.03)
URINE REFLEX TO CULTURE: ABNORMAL
URINE TYPE: ABNORMAL
UROBILINOGEN, URINE: 2 E.U./DL
WBC # BLD: 10.9 K/UL (ref 4–11)
WBC UA: ABNORMAL /HPF (ref 0–5)

## 2020-12-03 PROCEDURE — 2580000003 HC RX 258: Performed by: EMERGENCY MEDICINE

## 2020-12-03 PROCEDURE — 85730 THROMBOPLASTIN TIME PARTIAL: CPT

## 2020-12-03 PROCEDURE — 81001 URINALYSIS AUTO W/SCOPE: CPT

## 2020-12-03 PROCEDURE — 80048 BASIC METABOLIC PNL TOTAL CA: CPT

## 2020-12-03 PROCEDURE — 96372 THER/PROPH/DIAG INJ SC/IM: CPT

## 2020-12-03 PROCEDURE — 96374 THER/PROPH/DIAG INJ IV PUSH: CPT

## 2020-12-03 PROCEDURE — 36415 COLL VENOUS BLD VENIPUNCTURE: CPT

## 2020-12-03 PROCEDURE — G0328 FECAL BLOOD SCRN IMMUNOASSAY: HCPCS

## 2020-12-03 PROCEDURE — 2500000003 HC RX 250 WO HCPCS: Performed by: EMERGENCY MEDICINE

## 2020-12-03 PROCEDURE — 83735 ASSAY OF MAGNESIUM: CPT

## 2020-12-03 PROCEDURE — 6360000002 HC RX W HCPCS: Performed by: EMERGENCY MEDICINE

## 2020-12-03 PROCEDURE — 71045 X-RAY EXAM CHEST 1 VIEW: CPT

## 2020-12-03 PROCEDURE — 99282 EMERGENCY DEPT VISIT SF MDM: CPT

## 2020-12-03 PROCEDURE — 85610 PROTHROMBIN TIME: CPT

## 2020-12-03 PROCEDURE — 96375 TX/PRO/DX INJ NEW DRUG ADDON: CPT

## 2020-12-03 PROCEDURE — 85025 COMPLETE CBC W/AUTO DIFF WBC: CPT

## 2020-12-03 RX ORDER — PHYTONADIONE 10 MG/ML
2 INJECTION, EMULSION INTRAMUSCULAR; INTRAVENOUS; SUBCUTANEOUS ONCE
Status: COMPLETED | OUTPATIENT
Start: 2020-12-03 | End: 2020-12-03

## 2020-12-03 RX ORDER — MORPHINE SULFATE 15 MG/1
15 TABLET, FILM COATED, EXTENDED RELEASE ORAL 2 TIMES DAILY
COMMUNITY

## 2020-12-03 RX ORDER — PHYTONADIONE 5 MG/1
5 TABLET ORAL ONCE
Status: DISCONTINUED | OUTPATIENT
Start: 2020-12-03 | End: 2020-12-03

## 2020-12-03 RX ORDER — MORPHINE SULFATE 2 MG/ML
1 INJECTION, SOLUTION INTRAMUSCULAR; INTRAVENOUS ONCE
Status: COMPLETED | OUTPATIENT
Start: 2020-12-03 | End: 2020-12-03

## 2020-12-03 RX ORDER — SODIUM CHLORIDE 9 MG/ML
1000 INJECTION, SOLUTION INTRAVENOUS CONTINUOUS
Status: DISCONTINUED | OUTPATIENT
Start: 2020-12-03 | End: 2020-12-04 | Stop reason: HOSPADM

## 2020-12-03 RX ORDER — ONDANSETRON 2 MG/ML
4 INJECTION INTRAMUSCULAR; INTRAVENOUS ONCE
Status: COMPLETED | OUTPATIENT
Start: 2020-12-03 | End: 2020-12-03

## 2020-12-03 RX ADMIN — SODIUM CHLORIDE 1000 ML: 9 INJECTION, SOLUTION INTRAVENOUS at 20:09

## 2020-12-03 RX ADMIN — ONDANSETRON 4 MG: 2 INJECTION INTRAMUSCULAR; INTRAVENOUS at 20:09

## 2020-12-03 RX ADMIN — PHYTONADIONE 2 MG: 10 INJECTION, EMULSION INTRAMUSCULAR; INTRAVENOUS; SUBCUTANEOUS at 22:02

## 2020-12-03 RX ADMIN — MORPHINE SULFATE 1 MG: 2 INJECTION, SOLUTION INTRAMUSCULAR; INTRAVENOUS at 22:01

## 2020-12-03 RX ADMIN — FAMOTIDINE 20 MG: 10 INJECTION INTRAVENOUS at 20:09

## 2020-12-03 ASSESSMENT — PAIN DESCRIPTION - DESCRIPTORS: DESCRIPTORS: ACHING;CRAMPING

## 2020-12-03 ASSESSMENT — ENCOUNTER SYMPTOMS
CONSTIPATION: 1
VOMITING: 1
ABDOMINAL DISTENTION: 1
ABDOMINAL PAIN: 1

## 2020-12-03 ASSESSMENT — PAIN DESCRIPTION - LOCATION: LOCATION: ABDOMEN

## 2020-12-03 ASSESSMENT — PAIN SCALES - GENERAL: PAINLEVEL_OUTOF10: 8

## 2020-12-03 ASSESSMENT — PAIN DESCRIPTION - FREQUENCY: FREQUENCY: CONTINUOUS

## 2020-12-04 VITALS
BODY MASS INDEX: 20.02 KG/M2 | WEIGHT: 113 LBS | SYSTOLIC BLOOD PRESSURE: 123 MMHG | DIASTOLIC BLOOD PRESSURE: 95 MMHG | RESPIRATION RATE: 20 BRPM | HEIGHT: 63 IN | OXYGEN SATURATION: 96 % | HEART RATE: 122 BPM | TEMPERATURE: 97.5 F

## 2020-12-04 NOTE — ED NOTES
Report called to [facility] and R to [facility]. EMS notified of pt. Transfer back to [facility] and R.      Kya Tran RN  12/03/20 4274

## 2020-12-04 NOTE — ED PROVIDER NOTES
[address] [place] [address] Court  eMERGENCY dEPARTMENT eNCOUnter      Pt Name: Adán Lopez  MRN: [id number]  Adriangfángel 1945  Date of evaluation: 12/3/2020  Provider: Marquez Trevino MD    [address] [place] [address]       Chief Complaint   Patient presents with    Emesis         HISTORY OF PRESENT ILLNESS   (Location/Symptom, Timing/Onset, Context/Setting, Quality, Duration, Modifying Factors, Severity)  Note limiting factors. Adán Lopez is a 66 y.o. female who presents to the emergency department who presented by EMS with reports of coffee-ground emesis. EMS noted that the emesis looked like clear liquid with a small amount of coffee grounds. Pt reports diffuse abd pain and nausea but denies any other somatic complaints. \"I think I'm constipated. \" Pt does not know when her last BM was; due to her knee contractures, she does not get up to [facility] and instead voids in her adult diapers. Note is made of pt's ED visits here in Nov and Oct with UGI bleeds. Pt's  did not want further diagnostic work-up as she had had upper and lower scopes here in July 2019 and at [facility] in Oct 2019 with neg sources for bleed and neg bxx. At last ED visit, pt pulled out her NGt but is agreeable to NGt placement today. She was DNR during those admissions here but her NH paperwork shows that she is full code. MARS from the NH with the pt tonight show that she has been taking her iron supplements and PPI. 2019  CF, RN, reports that pt just refused NGt placement. Nursing Notes were reviewed. REVIEW OF SYSTEMS    (2-9 systems for level 4, 10 or more forlevel 5)     Review of Systems   Gastrointestinal: Positive for abdominal distention, abdominal pain, constipation and vomiting. All other systems reviewed and are negative. Except as noted above the remainder of the review of systems was reviewed and negative.        PAST MEDICAL HISTORY     Past Medical History:   Diagnosis Date    Anemia     Anxiety     Blind     Velasquez Burkitt syndrome     Chronic back pain     Constipation     Dementia ([facility]     Depression     Diabetes mellitus ([facility]     Dysphagia     GERD (gastroesophageal reflux disease)     GI bleed     History of opioid abuse ([facility] [address]     Hyperlipidemia     Hypertension     Insomnia     Interstitial cystitis     Retinitis pigmentosa     UTI (urinary tract infection)     Vitamin D deficiency     Vitamin deficiency          SURGICALHISTORY       Past Surgical History:   Procedure Laterality Date    APPENDECTOMY      BACK SURGERY      x2    BLADDER SURGERY      multiple bladder surgeries    BREAST SURGERY      lump removed    CARPAL TUNNEL RELEASE Bilateral     CHOLECYSTECTOMY      COLONOSCOPY N/A 7/8/2019    COLONOSCOPY POLYPECTOMY SNARE/COLD BIOPSY performed by Laine Matta MD at [address] GASTROINTESTINAL ENDOSCOPY N/A 7/8/2019    EGD BIOPSY performed by Laine Matta MD at [facility]       Current Discharge Medication List      CONTINUE these medications which have NOT CHANGED    Details   morphine (MS CONTIN) 15 MG extended release tablet Take 15 mg by mouth 2 times daily. simvastatin (ZOCOR) 40 MG tablet Take 40 mg by mouth nightly      NIFEdipine (ADALAT CC) 60 MG extended release tablet Take 1 tablet by mouth daily  Qty: 30 tablet, Refills: 3      magnesium oxide (MAG-OX) 400 (241.3 Mg) MG TABS tablet Take 1 tablet by mouth daily  Qty: 30 tablet, Refills: 3      zinc sulfate (ZINCATE) 220 (50 Zn) MG capsule Take 1 capsule by mouth daily  Qty: 30 capsule, Refills: 3      Cholecalciferol (VITAMIN D) 25 MCG TABS Take 1 tablet by mouth daily  Qty: 30 tablet, Refills: 3    Comments: Labeling may look different. 25 mcg=1000 Units. Please double check dosages.       glycerin moisturizing mouth spray (OASIS) 35 % LIQD Take 2 sprays by mouth 4 times daily      ferrous sulfate (IRON 325) 325 (65 Fe) Inability: None    Transportation needs     Medical: None     Non-medical: None   Tobacco Use    Smoking status: Never Smoker    Smokeless tobacco: Never Used   Substance and Sexual Activity    Alcohol use: No    Drug use: No    Sexual activity: None   Lifestyle    Physical activity     Days per week: None     Minutes per session: None    Stress: None   Relationships    Social connections     Talks on phone: None     Gets together: None     Attends Congregational service: None     Active member of club or organization: None     Attends meetings of clubs or organizations: None     Relationship status: None    Intimate partner violence     Fear of current or ex partner: None     Emotionally abused: None     Physically abused: None     Forced sexual activity: None   Other Topics Concern    None   Social History Narrative    None       SCREENINGS      @FLOW([id number])@      PHYSICAL EXAM    (up to 7 for level 4, 8 or more for level 5)     ED Triage Vitals [12/03/20 1951]   BP Temp Temp Source Pulse Resp SpO2 Height Weight   (!) 155/88 97.5 °F (36.4 °C) Oral 116 20 97 % 5' 3\" (1.6 m) 113 lb (51.3 kg)       Physical Exam  Vitals signs and nursing note reviewed. Exam conducted with a chaperone present. Constitutional:       Comments: Thin, petite elderly wf who is lying to L side, fetal position, with contractures at B knees. Pt is alert, NAD. HENT:      Head: Normocephalic and atraumatic. Eyes:      Conjunctiva/sclera: Conjunctivae normal.      Pupils: Pupils are equal, round, and reactive to light. Comments: Pt states she can see light but figures are not in focus and she can only note gross movement (her baseline)   Neck:      Musculoskeletal: Normal range of motion and neck supple. No muscular tenderness. Cardiovascular:      Rate and Rhythm: Regular rhythm. Tachycardia present. Pulses: Normal pulses. Heart sounds: Normal heart sounds. No murmur. No gallop.     Pulmonary:      Effort: Pulmonary effort is normal.      Breath sounds: Normal breath sounds. Abdominal:      General: Abdomen is protuberant. Bowel sounds are decreased. There is distension. Palpations: There is no mass. Tenderness: There is generalized abdominal tenderness. There is no guarding or rebound. Comments: abd was protuberant, tight and mildly TTP before fecal disimpaction; afterwards, it was soft and significantly less distended and less tender. Rectal exam: slightly decreased sphincter tone; vault was enlarged and had a large amount of formed but not hard BM. Stool was black and tested heme pos   Skin:     General: Skin is warm. Capillary Refill: Capillary refill takes less than 2 seconds. Neurological:      Mental Status: She is alert. Mental status is at baseline.          DIAGNOSTIC RESULTS     EKG: All EKG's are interpreted by the Emergency Department Physician who either signs or Co-signsthis chart in the absence of a cardiologist.        RADIOLOGY:   Yoel Cordial such as CT, Ultrasound and MRI are read by the radiologist. Plain radiographic images are visualized and preliminarily interpreted by the emergency physician with the below findings:        Interpretation per the Radiologist below, if available at the time ofthis note:    XR CHEST PORTABLE   Final Result      No acute disease            ED BEDSIDE ULTRASOUND:   Performed by ED Physician - none    LABS:  Labs Reviewed   CBC WITH AUTO DIFFERENTIAL - Abnormal; Notable for the following components:       Result Value    MCH 26.5 (*)     Platelets 025 (*)     MPV 11.3 (*)     Neutrophils Absolute 8.6 (*)     Lymphocytes Absolute 0.9 (*)     Monocytes Absolute 1.4 (*)     All other components within normal limits    Narrative:     Performed at:  [address] [place] [address] Laboratory  [address] [place] [address], [place] [address]   Phone [phone]   MAGNESIUM - Abnormal; Notable for the following components: Magnesium 2.7 (*)     All other components within normal limits    Narrative:     Performed at:  [address] [place] [address] Laboratory  [address] [place], [place] [address], [place] [address]   Phone [phone]   PROTIME-INR - Abnormal; Notable for the following components:    Protime 17.0 (*)     INR 1.38 (*)     All other components within normal limits    Narrative:     Performed at:  [address] [place] [address] Laboratory  [address] [place], [place] [address], [place] [address]   Phone [phone]   APTT - Abnormal; Notable for the following components:    aPTT 52.9 (*)     All other components within normal limits    Narrative:     Performed at:  [address] [place] [address] Laboratory  [address] [place], [place] [address], [place] [address]   Phone [phone]   URINE RT REFLEX TO CULTURE - Abnormal; Notable for the following components:    Bilirubin Urine MODERATE (*)     Ketones, Urine TRACE (*)     Blood, Urine SMALL (*)     Protein,  (*)     Urobilinogen, Urine 2.0 (*)     All other components within normal limits    Narrative:     Performed at:  [address] [place] [address] Laboratory  [address] [place], [place] [address], [place] [address]   Phone [phone]   BLOOD OCCULT STOOL DIAGNOSTIC - Abnormal; Notable for the following components:    Occult Blood Diagnostic   (*)     Value: Result: Result: POSITIVE  Normal range: Negative      All other components within normal limits    Narrative:     ORDER#: [id number]                          ORDERED BY: LEROY TOTH  SOURCE: Stool                              COLLECTED:  12/03/20 20:10  ANTIBIOTICS AT REGLA.:                      RECEIVED :  12/03/20 20:27  Performed at:  [address] [place] [address] Laboratory  [address] [place], [place] [address] [address]   Phone [phone]   BASIC METABOLIC PANEL - Abnormal; Notable for the following components:    Chloride 96 (*)     Anion Gap 18 (*)     Glucose 218 (*)     BUN 46 (*)     GFR Non- 48 (*)     GFR  58 (*)     Calcium 10.8 (*)     All other components within normal limits    Narrative:     Performed at:  [address] [facility] Laboratory  [facility] [place] [address]   Phone [phone] - Abnormal; Notable for the following components:    Coarse Casts, UA 3-5 (*)     Mucus, UA 3+ (*)     RBC, UA 5-10 (*)     Epithelial Cells, UA 11-20 (*)     Bacteria, UA 4+ (*)     Crystals, UA Few Ca. Oxalate (*)     All other components within normal limits    Narrative:     Performed at:  [address] [facility] Laboratory  [facility]  [place], [place] [address]   Phone [phone]       All other labs were within normal range or not returned as of this dictation. EMERGENCY DEPARTMENT COURSE and DIFFERENTIAL DIAGNOSIS/MDM:   Vitals:    Vitals:    12/03/20 2015 12/03/20 2030 12/03/20 2045 12/03/20 2100   BP: (!) 156/83 (!) 164/80 (!) 152/69 (!) 153/67   Pulse: 111 110  117   Resp:       Temp:       TempSrc:       SpO2: 97% 97%  100%   Weight:       Height:           PATIENT RECHECK:   2125  Pt reports abd discomfort, slightly decreased after a large amount of stool was disimpacted. PE revealed a decrease in distention and apparent discomfort. Pt denies personal or FHx of bleeding problems. CRITICAL CARE TIME   Total Critical Care time was 0 minutes, excluding separately reportable procedures. There was a high probability of clinically significant/life threatening deterioration in the patient's condition which required my urgent intervention. CONSULTS:  None    PROCEDURES:  None    FINAL IMPRESSION      1. Drug-induced constipation    2. Upper GI bleed    3.  Bleeding diathesis [facility])          DISPOSITION/PLAN   DISPOSITION Decision To Discharge 12/03/2020 09:30:14 PM      PATIENT REFERRED TO:  Cici Burroughs MD  [facility] [address]  [phone]      Your doctor will have to run blood tests to determine why your PT and PTT are elevated.       DISCHARGE MEDICATIONS:  Current Discharge Medication List          (Please note that portions of this note were completed with a voice recognition program.  Efforts were made to edit the dictations but occasionally words aremis-transcribed.)    Norah Dillon MD (electronically signed)  Attending Emergency Physician         Norah Dillon MD  12/03/20 3541

## 2020-12-04 NOTE — ED NOTES
Pt. Manually disimpacted 4 times and had good BM results each time. Pt.'s stool was blacklish/Gray in color and some was loose and some was formed. Pt. cleaned and repositioned each time.      Bryan Sharif RN  12/03/20 2334

## 2020-12-04 NOTE — ED NOTES
Patient with c/o vomiting and abdominal pain x 2 or 3 days. Patient has history of upper gi bleeds. Patient vomited a large amount of emesis per loretta epps ems, paramedic stated that it was mostly water with some light grounds noted in it.      Ricky Thompson RN  12/03/20 9685

## 2020-12-04 NOTE — ED NOTES
[facility]. Per reports, pt had 1 episode of coffee ground around 1800 tonight. Report pt has history for GI Bleeds. Reports pt denies any complaints denies pain. Reports pt is at her baseline. Reports that Patients base line in : Alert, Oriented, with dementia, Max assist, incont, 2LNC and blind  Code status: Full Code   Cheng Loja: , [phone]  Pt was COVID+ in October, but has not been tested since then.           Carmen Yu RN  12/03/20 6712

## 2021-01-01 ENCOUNTER — APPOINTMENT (OUTPATIENT)
Dept: OTHER | Facility: HOSPITAL | Age: 76
End: 2021-01-01

## 2021-01-01 ENCOUNTER — APPOINTMENT (OUTPATIENT)
Dept: CT IMAGING | Facility: HOSPITAL | Age: 76
End: 2021-01-01

## 2021-01-01 ENCOUNTER — HOSPITAL ENCOUNTER (INPATIENT)
Facility: HOSPITAL | Age: 76
LOS: 1 days | End: 2021-01-15
Attending: INTERNAL MEDICINE | Admitting: INTERNAL MEDICINE

## 2021-01-01 ENCOUNTER — HOSPITAL ENCOUNTER (INPATIENT)
Facility: HOSPITAL | Age: 76
LOS: 2 days | End: 2021-01-14
Attending: INTERNAL MEDICINE | Admitting: HOSPITALIST

## 2021-01-01 ENCOUNTER — APPOINTMENT (OUTPATIENT)
Dept: GENERAL RADIOLOGY | Facility: HOSPITAL | Age: 76
End: 2021-01-01

## 2021-01-01 VITALS
HEART RATE: 124 BPM | RESPIRATION RATE: 17 BRPM | OXYGEN SATURATION: 85 % | SYSTOLIC BLOOD PRESSURE: 171 MMHG | DIASTOLIC BLOOD PRESSURE: 91 MMHG | TEMPERATURE: 97.5 F | HEIGHT: 63 IN | WEIGHT: 107.36 LBS | BODY MASS INDEX: 19.02 KG/M2

## 2021-01-01 DIAGNOSIS — Z00.6 EXAMINATION FOR NORMAL COMPARISON FOR CLINICAL RESEARCH: ICD-10-CM

## 2021-01-01 LAB
ABO GROUP BLD: NORMAL
ABO GROUP BLD: NORMAL
ALBUMIN SERPL-MCNC: 4.3 G/DL (ref 3.5–5.2)
ALBUMIN/GLOB SERPL: 1 G/DL
ALP SERPL-CCNC: 192 U/L (ref 39–117)
ALT SERPL W P-5'-P-CCNC: 91 U/L (ref 1–33)
AMPHET+METHAMPHET UR QL: NEGATIVE
AMPHETAMINES UR QL: NEGATIVE
ANION GAP SERPL CALCULATED.3IONS-SCNC: 15 MMOL/L (ref 5–15)
APTT PPP: 31.4 SECONDS (ref 24–37)
ARTERIAL PATENCY WRIST A: ABNORMAL
AST SERPL-CCNC: 46 U/L (ref 1–32)
ATMOSPHERIC PRESS: ABNORMAL MM[HG]
BACTERIA SPEC AEROBE CULT: ABNORMAL
BACTERIA UR QL AUTO: ABNORMAL /HPF
BARBITURATES UR QL SCN: NEGATIVE
BASE EXCESS BLDA CALC-SCNC: 1.2 MMOL/L (ref 0–2)
BASOPHILS # BLD MANUAL: 0 10*3/MM3 (ref 0–0.2)
BASOPHILS NFR BLD AUTO: 0 % (ref 0–1.5)
BDY SITE: ABNORMAL
BENZODIAZ UR QL SCN: NEGATIVE
BILIRUB SERPL-MCNC: 0.5 MG/DL (ref 0–1.2)
BILIRUB UR QL STRIP: NEGATIVE
BLD GP AB SCN SERPL QL: NEGATIVE
BODY TEMPERATURE: 37 C
BUN SERPL-MCNC: 35 MG/DL (ref 8–23)
BUN/CREAT SERPL: 53 (ref 7–25)
BUPRENORPHINE SERPL-MCNC: NEGATIVE NG/ML
CA-I SERPL ISE-MCNC: 1.4 MMOL/L (ref 1.12–1.32)
CALCIUM SPEC-SCNC: 10.1 MG/DL (ref 8.6–10.5)
CANNABINOIDS SERPL QL: NEGATIVE
CHLORIDE SERPL-SCNC: 101 MMOL/L (ref 98–107)
CHOLEST SERPL-MCNC: 171 MG/DL (ref 0–200)
CK SERPL-CCNC: 43 U/L (ref 20–180)
CLARITY UR: ABNORMAL
CO2 BLDA-SCNC: 26 MMOL/L (ref 22–33)
CO2 SERPL-SCNC: 25 MMOL/L (ref 22–29)
COCAINE UR QL: NEGATIVE
COHGB MFR BLD: 0.9 % (ref 0–2)
COLOR UR: ABNORMAL
CORTIS SERPL-MCNC: 43.36 MCG/DL
CREAT SERPL-MCNC: 0.66 MG/DL (ref 0.57–1)
D-LACTATE SERPL-SCNC: 1.9 MMOL/L (ref 0.5–2)
DEPRECATED RDW RBC AUTO: 47.2 FL (ref 37–54)
EOSINOPHIL # BLD MANUAL: 0 10*3/MM3 (ref 0–0.4)
EOSINOPHIL NFR BLD MANUAL: 0 % (ref 0.3–6.2)
EPAP: 0
ERYTHROCYTE [DISTWIDTH] IN BLOOD BY AUTOMATED COUNT: 15.1 % (ref 12.3–15.4)
FLUAV RNA RESP QL NAA+PROBE: NOT DETECTED
FLUBV RNA RESP QL NAA+PROBE: NOT DETECTED
GFR SERPL CREATININE-BSD FRML MDRD: 87 ML/MIN/1.73
GLOBULIN UR ELPH-MCNC: 4.3 GM/DL
GLUCOSE BLDC GLUCOMTR-MCNC: 163 MG/DL (ref 70–130)
GLUCOSE BLDC GLUCOMTR-MCNC: 169 MG/DL (ref 70–130)
GLUCOSE BLDC GLUCOMTR-MCNC: 242 MG/DL (ref 70–130)
GLUCOSE BLDC GLUCOMTR-MCNC: 253 MG/DL (ref 70–130)
GLUCOSE SERPL-MCNC: 216 MG/DL (ref 65–99)
GLUCOSE UR STRIP-MCNC: NEGATIVE MG/DL
HBA1C MFR BLD: 6.5 % (ref 4.8–5.6)
HCO3 BLDA-SCNC: 24.9 MMOL/L (ref 20–26)
HCT VFR BLD AUTO: 43.9 % (ref 34–46.6)
HCT VFR BLD CALC: 39.8 %
HDLC SERPL-MCNC: 52 MG/DL (ref 40–60)
HGB BLD-MCNC: 13.5 G/DL (ref 12–15.9)
HGB BLDA-MCNC: 13 G/DL (ref 14–18)
HGB UR QL STRIP.AUTO: ABNORMAL
HYALINE CASTS UR QL AUTO: ABNORMAL /LPF
INHALED O2 CONCENTRATION: 21 %
INR PPP: 1.15 (ref 0.85–1.16)
IPAP: 0
KETONES UR QL STRIP: NEGATIVE
LDLC SERPL CALC-MCNC: 104 MG/DL (ref 0–100)
LDLC/HDLC SERPL: 1.98 {RATIO}
LEUKOCYTE ESTERASE UR QL STRIP.AUTO: ABNORMAL
LIPASE SERPL-CCNC: 28 U/L (ref 13–60)
LYMPHOCYTES # BLD MANUAL: 1.65 10*3/MM3 (ref 0.7–3.1)
LYMPHOCYTES NFR BLD MANUAL: 12 % (ref 19.6–45.3)
LYMPHOCYTES NFR BLD MANUAL: 4 % (ref 5–12)
MAGNESIUM SERPL-MCNC: 1.9 MG/DL (ref 1.6–2.4)
MCH RBC QN AUTO: 26.3 PG (ref 26.6–33)
MCHC RBC AUTO-ENTMCNC: 30.8 G/DL (ref 31.5–35.7)
MCV RBC AUTO: 85.6 FL (ref 79–97)
METHADONE UR QL SCN: NEGATIVE
METHGB BLD QL: 0.5 % (ref 0–1.5)
MODALITY: ABNORMAL
MONOCYTES # BLD AUTO: 0.55 10*3/MM3 (ref 0.1–0.9)
MUCOUS THREADS URNS QL MICRO: ABNORMAL /HPF
NEUTROPHILS # BLD AUTO: 11.53 10*3/MM3 (ref 1.7–7)
NEUTROPHILS NFR BLD MANUAL: 84 % (ref 42.7–76)
NITRITE UR QL STRIP: POSITIVE
NOTE: ABNORMAL
NT-PROBNP SERPL-MCNC: 175.9 PG/ML (ref 0–1800)
OPIATES UR QL: POSITIVE
OXYCODONE UR QL SCN: NEGATIVE
OXYHGB MFR BLDV: 95.5 % (ref 94–99)
PAW @ PEAK INSP FLOW SETTING VENT: 0 CMH2O
PCO2 BLDA: 35.9 MM HG (ref 35–45)
PCO2 TEMP ADJ BLD: 35.9 MM HG (ref 35–45)
PCP UR QL SCN: NEGATIVE
PH BLDA: 7.45 PH UNITS (ref 7.35–7.45)
PH UR STRIP.AUTO: 6.5 [PH] (ref 5–8)
PH, TEMP CORRECTED: 7.45 PH UNITS
PHOSPHATE SERPL-MCNC: 2.9 MG/DL (ref 2.5–4.5)
PLAT MORPH BLD: NORMAL
PLATELET # BLD AUTO: 450 10*3/MM3 (ref 140–450)
PMV BLD AUTO: 10.5 FL (ref 6–12)
PO2 BLDA: 80.5 MM HG (ref 83–108)
PO2 TEMP ADJ BLD: 80.5 MM HG (ref 83–108)
POTASSIUM SERPL-SCNC: 3 MMOL/L (ref 3.5–5.2)
POTASSIUM SERPL-SCNC: 3.8 MMOL/L (ref 3.5–5.2)
PROCALCITONIN SERPL-MCNC: 0.04 NG/ML (ref 0–0.25)
PROPOXYPH UR QL: NEGATIVE
PROT SERPL-MCNC: 8.6 G/DL (ref 6–8.5)
PROT UR QL STRIP: ABNORMAL
PROTHROMBIN TIME: 14.4 SECONDS (ref 11.5–14)
RBC # BLD AUTO: 5.13 10*6/MM3 (ref 3.77–5.28)
RBC # UR: ABNORMAL /HPF
RBC MORPH BLD: NORMAL
REF LAB TEST METHOD: ABNORMAL
RH BLD: POSITIVE
RH BLD: POSITIVE
SARS-COV-2 RNA RESP QL NAA+PROBE: NOT DETECTED
SODIUM SERPL-SCNC: 141 MMOL/L (ref 136–145)
SP GR UR STRIP: 1.01 (ref 1–1.03)
SQUAMOUS #/AREA URNS HPF: ABNORMAL /HPF
T&S EXPIRATION DATE: NORMAL
TOTAL RATE: 0 BREATHS/MINUTE
TRICYCLICS UR QL SCN: POSITIVE
TRIGL SERPL-MCNC: 81 MG/DL (ref 0–150)
TROPONIN T SERPL-MCNC: <0.01 NG/ML (ref 0–0.03)
TSH SERPL DL<=0.05 MIU/L-ACNC: 1.96 UIU/ML (ref 0.27–4.2)
UROBILINOGEN UR QL STRIP: ABNORMAL
VLDLC SERPL-MCNC: 15 MG/DL (ref 5–40)
WBC # BLD AUTO: 13.73 10*3/MM3 (ref 3.4–10.8)
WBC MORPH BLD: NORMAL
WBC UR QL AUTO: ABNORMAL /HPF

## 2021-01-01 PROCEDURE — 99291 CRITICAL CARE FIRST HOUR: CPT | Performed by: INTERNAL MEDICINE

## 2021-01-01 PROCEDURE — 25010000002 LORAZEPAM PER 2 MG: Performed by: INTERNAL MEDICINE

## 2021-01-01 PROCEDURE — 83050 HGB METHEMOGLOBIN QUAN: CPT

## 2021-01-01 PROCEDURE — 25010000002 FUROSEMIDE PER 20 MG: Performed by: NURSE PRACTITIONER

## 2021-01-01 PROCEDURE — 25010000003 POTASSIUM CHLORIDE 10 MEQ/100ML SOLUTION: Performed by: NURSE PRACTITIONER

## 2021-01-01 PROCEDURE — 83605 ASSAY OF LACTIC ACID: CPT | Performed by: NURSE PRACTITIONER

## 2021-01-01 PROCEDURE — 82805 BLOOD GASES W/O2 SATURATION: CPT

## 2021-01-01 PROCEDURE — 87636 SARSCOV2 & INF A&B AMP PRB: CPT | Performed by: NURSE PRACTITIONER

## 2021-01-01 PROCEDURE — 83036 HEMOGLOBIN GLYCOSYLATED A1C: CPT | Performed by: NURSE PRACTITIONER

## 2021-01-01 PROCEDURE — 82962 GLUCOSE BLOOD TEST: CPT

## 2021-01-01 PROCEDURE — 86900 BLOOD TYPING SEROLOGIC ABO: CPT

## 2021-01-01 PROCEDURE — 83735 ASSAY OF MAGNESIUM: CPT | Performed by: NURSE PRACTITIONER

## 2021-01-01 PROCEDURE — 80053 COMPREHEN METABOLIC PANEL: CPT | Performed by: NURSE PRACTITIONER

## 2021-01-01 PROCEDURE — 84443 ASSAY THYROID STIM HORMONE: CPT | Performed by: NURSE PRACTITIONER

## 2021-01-01 PROCEDURE — 84132 ASSAY OF SERUM POTASSIUM: CPT | Performed by: NURSE PRACTITIONER

## 2021-01-01 PROCEDURE — 87186 SC STD MICRODIL/AGAR DIL: CPT | Performed by: NURSE PRACTITIONER

## 2021-01-01 PROCEDURE — 36600 WITHDRAWAL OF ARTERIAL BLOOD: CPT

## 2021-01-01 PROCEDURE — 80061 LIPID PANEL: CPT | Performed by: NURSE PRACTITIONER

## 2021-01-01 PROCEDURE — 25010000002 MORPHINE PER 10 MG: Performed by: INTERNAL MEDICINE

## 2021-01-01 PROCEDURE — 86850 RBC ANTIBODY SCREEN: CPT | Performed by: NURSE PRACTITIONER

## 2021-01-01 PROCEDURE — 63710000001 INSULIN LISPRO (HUMAN) PER 5 UNITS: Performed by: NURSE PRACTITIONER

## 2021-01-01 PROCEDURE — 25010000002 LORAZEPAM PER 2 MG: Performed by: NURSE PRACTITIONER

## 2021-01-01 PROCEDURE — 85027 COMPLETE CBC AUTOMATED: CPT | Performed by: NURSE PRACTITIONER

## 2021-01-01 PROCEDURE — 87088 URINE BACTERIA CULTURE: CPT | Performed by: NURSE PRACTITIONER

## 2021-01-01 PROCEDURE — 99232 SBSQ HOSP IP/OBS MODERATE 35: CPT | Performed by: HOSPITALIST

## 2021-01-01 PROCEDURE — 25010000002 MORPHINE PER 10 MG: Performed by: FAMILY MEDICINE

## 2021-01-01 PROCEDURE — 82550 ASSAY OF CK (CPK): CPT | Performed by: NURSE PRACTITIONER

## 2021-01-01 PROCEDURE — 85610 PROTHROMBIN TIME: CPT | Performed by: NURSE PRACTITIONER

## 2021-01-01 PROCEDURE — 83880 ASSAY OF NATRIURETIC PEPTIDE: CPT | Performed by: NURSE PRACTITIONER

## 2021-01-01 PROCEDURE — 25010000002 KETOROLAC TROMETHAMINE PER 15 MG: Performed by: NURSE PRACTITIONER

## 2021-01-01 PROCEDURE — 85007 BL SMEAR W/DIFF WBC COUNT: CPT | Performed by: NURSE PRACTITIONER

## 2021-01-01 PROCEDURE — 99221 1ST HOSP IP/OBS SF/LOW 40: CPT | Performed by: NEUROLOGICAL SURGERY

## 2021-01-01 PROCEDURE — 83690 ASSAY OF LIPASE: CPT | Performed by: NURSE PRACTITIONER

## 2021-01-01 PROCEDURE — 99232 SBSQ HOSP IP/OBS MODERATE 35: CPT | Performed by: INTERNAL MEDICINE

## 2021-01-01 PROCEDURE — 25010000002 HYDROMORPHONE 1 MG/ML SOLUTION: Performed by: NURSE PRACTITIONER

## 2021-01-01 PROCEDURE — 25010000002 LORAZEPAM PER 2 MG: Performed by: FAMILY MEDICINE

## 2021-01-01 PROCEDURE — 82330 ASSAY OF CALCIUM: CPT | Performed by: NURSE PRACTITIONER

## 2021-01-01 PROCEDURE — 86901 BLOOD TYPING SEROLOGIC RH(D): CPT | Performed by: NURSE PRACTITIONER

## 2021-01-01 PROCEDURE — 25010000002 HYDROMORPHONE HCL-NACL 30-0.9 MG/30ML-% SOLUTION PREFILLED SYRINGE: Performed by: NURSE PRACTITIONER

## 2021-01-01 PROCEDURE — 25010000002 FUROSEMIDE PER 20 MG: Performed by: FAMILY MEDICINE

## 2021-01-01 PROCEDURE — 99223 1ST HOSP IP/OBS HIGH 75: CPT | Performed by: NURSE PRACTITIONER

## 2021-01-01 PROCEDURE — 86900 BLOOD TYPING SEROLOGIC ABO: CPT | Performed by: NURSE PRACTITIONER

## 2021-01-01 PROCEDURE — 85730 THROMBOPLASTIN TIME PARTIAL: CPT | Performed by: NURSE PRACTITIONER

## 2021-01-01 PROCEDURE — 71045 X-RAY EXAM CHEST 1 VIEW: CPT

## 2021-01-01 PROCEDURE — 80306 DRUG TEST PRSMV INSTRMNT: CPT | Performed by: NURSE PRACTITIONER

## 2021-01-01 PROCEDURE — 87086 URINE CULTURE/COLONY COUNT: CPT | Performed by: NURSE PRACTITIONER

## 2021-01-01 PROCEDURE — 84145 PROCALCITONIN (PCT): CPT | Performed by: NURSE PRACTITIONER

## 2021-01-01 PROCEDURE — 82533 TOTAL CORTISOL: CPT | Performed by: NURSE PRACTITIONER

## 2021-01-01 PROCEDURE — 87040 BLOOD CULTURE FOR BACTERIA: CPT | Performed by: NURSE PRACTITIONER

## 2021-01-01 PROCEDURE — 70450 CT HEAD/BRAIN W/O DYE: CPT

## 2021-01-01 PROCEDURE — 84100 ASSAY OF PHOSPHORUS: CPT | Performed by: NURSE PRACTITIONER

## 2021-01-01 PROCEDURE — 86901 BLOOD TYPING SEROLOGIC RH(D): CPT

## 2021-01-01 PROCEDURE — 84484 ASSAY OF TROPONIN QUANT: CPT | Performed by: NURSE PRACTITIONER

## 2021-01-01 PROCEDURE — 82375 ASSAY CARBOXYHB QUANT: CPT

## 2021-01-01 PROCEDURE — 81001 URINALYSIS AUTO W/SCOPE: CPT | Performed by: NURSE PRACTITIONER

## 2021-01-01 RX ORDER — BISACODYL 10 MG
10 SUPPOSITORY, RECTAL RECTAL DAILY PRN
Status: DISCONTINUED | OUTPATIENT
Start: 2021-01-01 | End: 2021-01-01 | Stop reason: HOSPADM

## 2021-01-01 RX ORDER — KETOROLAC TROMETHAMINE 15 MG/ML
15 INJECTION, SOLUTION INTRAMUSCULAR; INTRAVENOUS EVERY 6 HOURS PRN
Status: DISCONTINUED | OUTPATIENT
Start: 2021-01-01 | End: 2021-01-01 | Stop reason: HOSPADM

## 2021-01-01 RX ORDER — POLYVINYL ALCOHOL 14 MG/ML
2 SOLUTION/ DROPS OPHTHALMIC 2 TIMES DAILY
Status: DISCONTINUED | OUTPATIENT
Start: 2021-01-01 | End: 2021-01-01 | Stop reason: HOSPADM

## 2021-01-01 RX ORDER — GLYCOPYRROLATE 0.2 MG/ML
0.4 INJECTION INTRAMUSCULAR; INTRAVENOUS EVERY 4 HOURS PRN
Status: CANCELLED | OUTPATIENT
Start: 2021-01-01

## 2021-01-01 RX ORDER — POTASSIUM CHLORIDE 7.45 MG/ML
10 INJECTION INTRAVENOUS
Status: CANCELLED | OUTPATIENT
Start: 2021-01-01

## 2021-01-01 RX ORDER — LORAZEPAM 2 MG/ML
0.5 INJECTION INTRAMUSCULAR EVERY 4 HOURS PRN
Status: DISCONTINUED | OUTPATIENT
Start: 2021-01-01 | End: 2021-01-01 | Stop reason: HOSPADM

## 2021-01-01 RX ORDER — NALOXONE HCL 0.4 MG/ML
0.1 VIAL (ML) INJECTION
Status: DISCONTINUED | OUTPATIENT
Start: 2021-01-01 | End: 2021-01-01

## 2021-01-01 RX ORDER — MORPHINE SULFATE 1 MG/ML
INJECTION INTRAVENOUS CONTINUOUS
Status: DISCONTINUED | OUTPATIENT
Start: 2021-01-01 | End: 2021-01-01

## 2021-01-01 RX ORDER — POTASSIUM CHLORIDE 750 MG/1
40 CAPSULE, EXTENDED RELEASE ORAL AS NEEDED
Status: CANCELLED | OUTPATIENT
Start: 2021-01-01

## 2021-01-01 RX ORDER — GLYCOPYRROLATE 0.2 MG/ML
0.4 INJECTION INTRAMUSCULAR; INTRAVENOUS EVERY 4 HOURS PRN
Status: DISCONTINUED | OUTPATIENT
Start: 2021-01-01 | End: 2021-01-01 | Stop reason: HOSPADM

## 2021-01-01 RX ORDER — SODIUM CHLORIDE 0.9 % (FLUSH) 0.9 %
10 SYRINGE (ML) INJECTION EVERY 12 HOURS SCHEDULED
Status: DISCONTINUED | OUTPATIENT
Start: 2021-01-01 | End: 2021-01-01 | Stop reason: HOSPADM

## 2021-01-01 RX ORDER — POTASSIUM CHLORIDE 1.5 G/1.77G
40 POWDER, FOR SOLUTION ORAL AS NEEDED
Status: DISCONTINUED | OUTPATIENT
Start: 2021-01-01 | End: 2021-01-01 | Stop reason: HOSPADM

## 2021-01-01 RX ORDER — SCOLOPAMINE TRANSDERMAL SYSTEM 1 MG/1
1 PATCH, EXTENDED RELEASE TRANSDERMAL
Status: CANCELLED | OUTPATIENT
Start: 2021-01-17

## 2021-01-01 RX ORDER — LORAZEPAM 2 MG/ML
1 INJECTION INTRAMUSCULAR EVERY 4 HOURS PRN
Status: DISCONTINUED | OUTPATIENT
Start: 2021-01-01 | End: 2021-01-01 | Stop reason: HOSPADM

## 2021-01-01 RX ORDER — FUROSEMIDE 10 MG/ML
20 INJECTION INTRAMUSCULAR; INTRAVENOUS EVERY 6 HOURS
Status: DISCONTINUED | OUTPATIENT
Start: 2021-01-01 | End: 2021-01-01 | Stop reason: HOSPADM

## 2021-01-01 RX ORDER — LORAZEPAM 2 MG/ML
0.5 INJECTION INTRAMUSCULAR EVERY 4 HOURS PRN
Status: CANCELLED | OUTPATIENT
Start: 2021-01-01 | End: 2021-01-23

## 2021-01-01 RX ORDER — POTASSIUM CHLORIDE 1.5 G/1.77G
40 POWDER, FOR SOLUTION ORAL AS NEEDED
Status: CANCELLED | OUTPATIENT
Start: 2021-01-01

## 2021-01-01 RX ORDER — MORPHINE SULFATE 1 MG/ML
INJECTION INTRAVENOUS CONTINUOUS
Status: DISCONTINUED | OUTPATIENT
Start: 2021-01-01 | End: 2021-01-01 | Stop reason: HOSPADM

## 2021-01-01 RX ORDER — SCOLOPAMINE TRANSDERMAL SYSTEM 1 MG/1
1 PATCH, EXTENDED RELEASE TRANSDERMAL
Status: DISCONTINUED | OUTPATIENT
Start: 2021-01-01 | End: 2021-01-01 | Stop reason: HOSPADM

## 2021-01-01 RX ORDER — SODIUM CHLORIDE 0.9 % (FLUSH) 0.9 %
10 SYRINGE (ML) INJECTION AS NEEDED
Status: DISCONTINUED | OUTPATIENT
Start: 2021-01-01 | End: 2021-01-01 | Stop reason: HOSPADM

## 2021-01-01 RX ORDER — MORPHINE SULFATE 2 MG/ML
2 INJECTION, SOLUTION INTRAMUSCULAR; INTRAVENOUS
Status: CANCELLED | OUTPATIENT
Start: 2021-01-01 | End: 2021-01-23

## 2021-01-01 RX ORDER — NALOXONE HCL 0.4 MG/ML
0.1 VIAL (ML) INJECTION
Status: DISCONTINUED | OUTPATIENT
Start: 2021-01-01 | End: 2021-01-01 | Stop reason: HOSPADM

## 2021-01-01 RX ORDER — NALOXONE HCL 0.4 MG/ML
0.1 VIAL (ML) INJECTION
Status: CANCELLED | OUTPATIENT
Start: 2021-01-01

## 2021-01-01 RX ORDER — MORPHINE SULFATE 2 MG/ML
2 INJECTION, SOLUTION INTRAMUSCULAR; INTRAVENOUS
Status: DISCONTINUED | OUTPATIENT
Start: 2021-01-01 | End: 2021-01-01 | Stop reason: HOSPADM

## 2021-01-01 RX ORDER — LABETALOL HYDROCHLORIDE 5 MG/ML
20 INJECTION, SOLUTION INTRAVENOUS EVERY 4 HOURS PRN
Status: DISCONTINUED | OUTPATIENT
Start: 2021-01-01 | End: 2021-01-01

## 2021-01-01 RX ORDER — POTASSIUM CHLORIDE 7.45 MG/ML
10 INJECTION INTRAVENOUS
Status: DISCONTINUED | OUTPATIENT
Start: 2021-01-01 | End: 2021-01-01 | Stop reason: HOSPADM

## 2021-01-01 RX ORDER — HALOPERIDOL 5 MG/ML
1 INJECTION INTRAMUSCULAR EVERY 4 HOURS PRN
Status: DISCONTINUED | OUTPATIENT
Start: 2021-01-01 | End: 2021-01-01 | Stop reason: HOSPADM

## 2021-01-01 RX ORDER — LORAZEPAM 2 MG/ML
0.5 INJECTION INTRAMUSCULAR EVERY 8 HOURS
Status: DISCONTINUED | OUTPATIENT
Start: 2021-01-01 | End: 2021-01-01 | Stop reason: HOSPADM

## 2021-01-01 RX ORDER — POLYVINYL ALCOHOL 14 MG/ML
2 SOLUTION/ DROPS OPHTHALMIC
Status: DISCONTINUED | OUTPATIENT
Start: 2021-01-01 | End: 2021-01-01 | Stop reason: HOSPADM

## 2021-01-01 RX ORDER — FUROSEMIDE 10 MG/ML
40 INJECTION INTRAMUSCULAR; INTRAVENOUS ONCE
Status: COMPLETED | OUTPATIENT
Start: 2021-01-01 | End: 2021-01-01

## 2021-01-01 RX ORDER — SODIUM CHLORIDE 0.9 % (FLUSH) 0.9 %
10 SYRINGE (ML) INJECTION AS NEEDED
Status: CANCELLED | OUTPATIENT
Start: 2021-01-01

## 2021-01-01 RX ORDER — SODIUM CHLORIDE, SODIUM LACTATE, POTASSIUM CHLORIDE, CALCIUM CHLORIDE 600; 310; 30; 20 MG/100ML; MG/100ML; MG/100ML; MG/100ML
75 INJECTION, SOLUTION INTRAVENOUS CONTINUOUS
Status: DISCONTINUED | OUTPATIENT
Start: 2021-01-01 | End: 2021-01-01

## 2021-01-01 RX ORDER — GLYCOPYRROLATE 0.2 MG/ML
0.2 INJECTION INTRAMUSCULAR; INTRAVENOUS EVERY 6 HOURS PRN
Status: DISCONTINUED | OUTPATIENT
Start: 2021-01-01 | End: 2021-01-01 | Stop reason: HOSPADM

## 2021-01-01 RX ORDER — FUROSEMIDE 10 MG/ML
20 INJECTION INTRAMUSCULAR; INTRAVENOUS EVERY 6 HOURS PRN
Status: DISCONTINUED | OUTPATIENT
Start: 2021-01-01 | End: 2021-01-01 | Stop reason: HOSPADM

## 2021-01-01 RX ORDER — POTASSIUM CHLORIDE 750 MG/1
40 CAPSULE, EXTENDED RELEASE ORAL AS NEEDED
Status: DISCONTINUED | OUTPATIENT
Start: 2021-01-01 | End: 2021-01-01 | Stop reason: HOSPADM

## 2021-01-01 RX ORDER — ACETAMINOPHEN 650 MG/1
650 SUPPOSITORY RECTAL EVERY 4 HOURS PRN
Status: DISCONTINUED | OUTPATIENT
Start: 2021-01-01 | End: 2021-01-01 | Stop reason: HOSPADM

## 2021-01-01 RX ORDER — MORPHINE SULFATE 1 MG/ML
INJECTION INTRAVENOUS CONTINUOUS
Status: CANCELLED | OUTPATIENT
Start: 2021-01-01 | End: 2021-01-16

## 2021-01-01 RX ORDER — LORAZEPAM 2 MG/ML
0.5 INJECTION INTRAMUSCULAR EVERY 4 HOURS PRN
Status: DISCONTINUED | OUTPATIENT
Start: 2021-01-01 | End: 2021-01-01

## 2021-01-01 RX ORDER — SODIUM CHLORIDE 0.9 % (FLUSH) 0.9 %
10 SYRINGE (ML) INJECTION EVERY 12 HOURS SCHEDULED
Status: CANCELLED | OUTPATIENT
Start: 2021-01-01

## 2021-01-01 RX ADMIN — SCOPALAMINE 1 PATCH: 1 PATCH, EXTENDED RELEASE TRANSDERMAL at 17:06

## 2021-01-01 RX ADMIN — GLYCOPYRROLATE 0.4 MG: 0.2 INJECTION INTRAMUSCULAR; INTRAVENOUS at 06:14

## 2021-01-01 RX ADMIN — POTASSIUM CHLORIDE 10 MEQ: 7.46 INJECTION, SOLUTION INTRAVENOUS at 21:00

## 2021-01-01 RX ADMIN — NICARDIPINE HYDROCHLORIDE 15 MG/HR: 0.1 INJECTION, SOLUTION INTRAVENOUS at 23:56

## 2021-01-01 RX ADMIN — LORAZEPAM 0.5 MG: 2 INJECTION INTRAMUSCULAR; INTRAVENOUS at 09:04

## 2021-01-01 RX ADMIN — SCOPALAMINE 1 PATCH: 1 PATCH, EXTENDED RELEASE TRANSDERMAL at 06:14

## 2021-01-01 RX ADMIN — Medication: at 02:42

## 2021-01-01 RX ADMIN — MORPHINE SULFATE 2 MG: 2 INJECTION, SOLUTION INTRAMUSCULAR; INTRAVENOUS at 16:25

## 2021-01-01 RX ADMIN — MORPHINE SULFATE 2 MG: 2 INJECTION, SOLUTION INTRAMUSCULAR; INTRAVENOUS at 12:57

## 2021-01-01 RX ADMIN — LORAZEPAM 0.5 MG: 2 INJECTION INTRAMUSCULAR; INTRAVENOUS at 02:40

## 2021-01-01 RX ADMIN — LORAZEPAM 0.5 MG: 2 INJECTION INTRAMUSCULAR; INTRAVENOUS at 18:02

## 2021-01-01 RX ADMIN — MORPHINE SULFATE 2 MG: 2 INJECTION, SOLUTION INTRAMUSCULAR; INTRAVENOUS at 15:21

## 2021-01-01 RX ADMIN — LORAZEPAM 0.5 MG: 2 INJECTION INTRAMUSCULAR; INTRAVENOUS at 11:24

## 2021-01-01 RX ADMIN — NICARDIPINE HYDROCHLORIDE 15 MG/HR: 0.1 INJECTION, SOLUTION INTRAVENOUS at 01:19

## 2021-01-01 RX ADMIN — SODIUM CHLORIDE, PRESERVATIVE FREE 10 ML: 5 INJECTION INTRAVENOUS at 20:14

## 2021-01-01 RX ADMIN — INSULIN LISPRO 4 UNITS: 100 INJECTION, SOLUTION INTRAVENOUS; SUBCUTANEOUS at 06:17

## 2021-01-01 RX ADMIN — POLYVINYL ALCOHOL 2 DROP: 14 SOLUTION/ DROPS OPHTHALMIC at 05:23

## 2021-01-01 RX ADMIN — POTASSIUM CHLORIDE 10 MEQ: 7.46 INJECTION, SOLUTION INTRAVENOUS at 18:48

## 2021-01-01 RX ADMIN — FUROSEMIDE 20 MG: 10 INJECTION, SOLUTION INTRAMUSCULAR; INTRAVENOUS at 17:05

## 2021-01-01 RX ADMIN — MINERAL OIL: 1000 LIQUID ORAL at 23:32

## 2021-01-01 RX ADMIN — LABETALOL 20 MG/4 ML (5 MG/ML) INTRAVENOUS SYRINGE 20 MG: at 02:28

## 2021-01-01 RX ADMIN — Medication: at 17:33

## 2021-01-01 RX ADMIN — FUROSEMIDE 20 MG: 10 INJECTION, SOLUTION INTRAVENOUS at 17:04

## 2021-01-01 RX ADMIN — FUROSEMIDE 20 MG: 10 INJECTION, SOLUTION INTRAMUSCULAR; INTRAVENOUS at 13:12

## 2021-01-01 RX ADMIN — POTASSIUM CHLORIDE 10 MEQ: 7.46 INJECTION, SOLUTION INTRAVENOUS at 22:01

## 2021-01-01 RX ADMIN — POLYVINYL ALCOHOL 2 DROP: 14 SOLUTION/ DROPS OPHTHALMIC at 18:02

## 2021-01-01 RX ADMIN — SODIUM CHLORIDE, PRESERVATIVE FREE 10 ML: 5 INJECTION INTRAVENOUS at 08:31

## 2021-01-01 RX ADMIN — HYDROMORPHONE HYDROCHLORIDE 1 MG: 1 INJECTION, SOLUTION INTRAMUSCULAR; INTRAVENOUS; SUBCUTANEOUS at 13:12

## 2021-01-01 RX ADMIN — MINERAL OIL: 1000 LIQUID ORAL at 18:02

## 2021-01-01 RX ADMIN — Medication: at 17:07

## 2021-01-01 RX ADMIN — MINERAL OIL: 1000 LIQUID ORAL at 05:23

## 2021-01-01 RX ADMIN — MORPHINE SULFATE 2 MG: 2 INJECTION, SOLUTION INTRAMUSCULAR; INTRAVENOUS at 09:50

## 2021-01-01 RX ADMIN — Medication: at 11:09

## 2021-01-01 RX ADMIN — NICARDIPINE HYDROCHLORIDE 7.5 MG/HR: 0.1 INJECTION, SOLUTION INTRAVENOUS at 20:14

## 2021-01-01 RX ADMIN — NICARDIPINE HYDROCHLORIDE 12.5 MG/HR: 0.1 INJECTION, SOLUTION INTRAVENOUS at 22:26

## 2021-01-01 RX ADMIN — INSULIN LISPRO 3 UNITS: 100 INJECTION, SOLUTION INTRAVENOUS; SUBCUTANEOUS at 23:56

## 2021-01-01 RX ADMIN — KETOROLAC TROMETHAMINE 15 MG: 15 INJECTION, SOLUTION INTRAMUSCULAR; INTRAVENOUS at 09:11

## 2021-01-01 RX ADMIN — NICARDIPINE HYDROCHLORIDE 5 MG/HR: 0.1 INJECTION, SOLUTION INTRAVENOUS at 17:04

## 2021-01-01 RX ADMIN — LORAZEPAM 0.5 MG: 2 INJECTION INTRAMUSCULAR; INTRAVENOUS at 07:28

## 2021-01-01 RX ADMIN — HYDROMORPHONE HYDROCHLORIDE 1 MG: 1 INJECTION, SOLUTION INTRAMUSCULAR; INTRAVENOUS; SUBCUTANEOUS at 09:04

## 2021-01-01 RX ADMIN — GLYCOPYRROLATE 0.2 MG: 0.2 INJECTION INTRAMUSCULAR; INTRAVENOUS at 20:30

## 2021-01-01 RX ADMIN — POTASSIUM CHLORIDE 10 MEQ: 7.46 INJECTION, SOLUTION INTRAVENOUS at 22:59

## 2021-01-01 RX ADMIN — GLYCOPYRROLATE 0.4 MG: 0.2 INJECTION INTRAMUSCULAR; INTRAVENOUS at 09:50

## 2021-01-01 RX ADMIN — MORPHINE SULFATE 2 MG: 2 INJECTION, SOLUTION INTRAMUSCULAR; INTRAVENOUS at 15:09

## 2021-01-01 RX ADMIN — LORAZEPAM 1 MG: 2 INJECTION INTRAMUSCULAR; INTRAVENOUS at 13:12

## 2021-01-01 RX ADMIN — LORAZEPAM 0.5 MG: 2 INJECTION INTRAMUSCULAR; INTRAVENOUS at 12:14

## 2021-01-01 RX ADMIN — SODIUM CHLORIDE, PRESERVATIVE FREE 10 ML: 5 INJECTION INTRAVENOUS at 20:57

## 2021-01-01 RX ADMIN — POTASSIUM CHLORIDE 10 MEQ: 7.46 INJECTION, SOLUTION INTRAVENOUS at 19:56

## 2021-01-01 RX ADMIN — LABETALOL 20 MG/4 ML (5 MG/ML) INTRAVENOUS SYRINGE 20 MG: at 08:24

## 2021-01-01 RX ADMIN — FUROSEMIDE 20 MG: 10 INJECTION, SOLUTION INTRAMUSCULAR; INTRAVENOUS at 23:31

## 2021-01-01 RX ADMIN — FUROSEMIDE 40 MG: 10 INJECTION, SOLUTION INTRAMUSCULAR; INTRAVENOUS at 07:52

## 2021-01-01 RX ADMIN — LORAZEPAM 0.5 MG: 2 INJECTION INTRAMUSCULAR; INTRAVENOUS at 16:25

## 2021-01-01 RX ADMIN — MORPHINE SULFATE 2 MG: 2 INJECTION, SOLUTION INTRAMUSCULAR; INTRAVENOUS at 07:12

## 2021-01-01 RX ADMIN — MORPHINE SULFATE 2 MG: 2 INJECTION, SOLUTION INTRAMUSCULAR; INTRAVENOUS at 13:59

## 2021-01-01 RX ADMIN — POTASSIUM CHLORIDE 10 MEQ: 7.46 INJECTION, SOLUTION INTRAVENOUS at 16:47

## 2021-01-01 RX ADMIN — MINERAL OIL: 1000 LIQUID ORAL at 13:12

## 2021-01-01 RX ADMIN — FUROSEMIDE 20 MG: 10 INJECTION, SOLUTION INTRAMUSCULAR; INTRAVENOUS at 05:23

## 2021-01-01 RX ADMIN — KETOROLAC TROMETHAMINE 15 MG: 15 INJECTION, SOLUTION INTRAMUSCULAR; INTRAVENOUS at 02:46

## 2021-01-01 RX ADMIN — MORPHINE SULFATE 2 MG: 2 INJECTION, SOLUTION INTRAMUSCULAR; INTRAVENOUS at 12:24

## 2021-01-01 RX ADMIN — INSULIN LISPRO 2 UNITS: 100 INJECTION, SOLUTION INTRAVENOUS; SUBCUTANEOUS at 17:09

## 2021-01-01 RX ADMIN — SODIUM CHLORIDE, POTASSIUM CHLORIDE, SODIUM LACTATE AND CALCIUM CHLORIDE 75 ML/HR: 600; 310; 30; 20 INJECTION, SOLUTION INTRAVENOUS at 09:28

## 2021-01-12 ENCOUNTER — HOSPITAL ENCOUNTER (EMERGENCY)
Facility: HOSPITAL | Age: 76
Discharge: ANOTHER HOSPITAL | End: 2021-01-12
Attending: EMERGENCY MEDICINE
Payer: MEDICARE

## 2021-01-12 ENCOUNTER — APPOINTMENT (OUTPATIENT)
Dept: CT IMAGING | Facility: HOSPITAL | Age: 76
End: 2021-01-12
Payer: MEDICARE

## 2021-01-12 ENCOUNTER — APPOINTMENT (OUTPATIENT)
Dept: GENERAL RADIOLOGY | Facility: HOSPITAL | Age: 76
End: 2021-01-12
Payer: MEDICARE

## 2021-01-12 VITALS
SYSTOLIC BLOOD PRESSURE: 183 MMHG | TEMPERATURE: 96.8 F | WEIGHT: 113 LBS | BODY MASS INDEX: 20.02 KG/M2 | RESPIRATION RATE: 21 BRPM | HEART RATE: 115 BPM | DIASTOLIC BLOOD PRESSURE: 96 MMHG | OXYGEN SATURATION: 98 %

## 2021-01-12 DIAGNOSIS — I61.9 HEMORRHAGIC STROKE (HCC): Primary | ICD-10-CM

## 2021-01-12 PROBLEM — F03.90 DEMENTIA (HCC): Status: ACTIVE | Noted: 2021-01-01

## 2021-01-12 PROBLEM — M24.569 CONTRACTURE OF LOWER LEG JOINT: Status: ACTIVE | Noted: 2021-01-01

## 2021-01-12 PROBLEM — Z74.01 BEDBOUND: Status: ACTIVE | Noted: 2021-01-01

## 2021-01-12 PROBLEM — Z86.73 HISTORY OF CVA (CEREBROVASCULAR ACCIDENT): Chronic | Status: ACTIVE | Noted: 2021-01-01

## 2021-01-12 PROBLEM — Z74.01 BEDBOUND: Chronic | Status: ACTIVE | Noted: 2021-01-01

## 2021-01-12 PROBLEM — Z86.73 HISTORY OF CVA (CEREBROVASCULAR ACCIDENT): Status: ACTIVE | Noted: 2021-01-01

## 2021-01-12 PROBLEM — M24.569 CONTRACTURE OF LOWER LEG JOINT: Chronic | Status: ACTIVE | Noted: 2021-01-01

## 2021-01-12 PROBLEM — F03.90 DEMENTIA (HCC): Chronic | Status: ACTIVE | Noted: 2021-01-01

## 2021-01-12 LAB
ANION GAP SERPL CALCULATED.3IONS-SCNC: 13 MMOL/L (ref 3–16)
BASE EXCESS VENOUS: 1 MMOL/L (ref -3–3)
BASOPHILS ABSOLUTE: 0 K/UL (ref 0–0.1)
BASOPHILS RELATIVE PERCENT: 0.3 %
BUN BLDV-MCNC: 33 MG/DL (ref 6–20)
CALCIUM SERPL-MCNC: 9.9 MG/DL (ref 8.5–10.5)
CHLORIDE BLD-SCNC: 102 MMOL/L (ref 98–107)
CO2: 26 MMOL/L (ref 20–30)
CREAT SERPL-MCNC: <0.5 MG/DL (ref 0.4–1.2)
EOSINOPHILS ABSOLUTE: 0.1 K/UL (ref 0–0.4)
EOSINOPHILS RELATIVE PERCENT: 0.7 %
GFR AFRICAN AMERICAN: >59
GFR NON-AFRICAN AMERICAN: >60
GLUCOSE BLD-MCNC: 130 MG/DL
GLUCOSE BLD-MCNC: 130 MG/DL (ref 74–106)
GLUCOSE BLD-MCNC: 136 MG/DL (ref 74–106)
HCO3 VENOUS: 25.4 MMOL/L (ref 23–29)
HCT VFR BLD CALC: 41.4 % (ref 37–47)
HEMOGLOBIN: 13.1 G/DL (ref 11.5–16.5)
IMMATURE GRANULOCYTES #: 0 K/UL
IMMATURE GRANULOCYTES %: 0.3 % (ref 0–5)
INR BLD: 1.06 (ref 0.87–1.14)
LYMPHOCYTES ABSOLUTE: 1.1 K/UL (ref 1.5–4)
LYMPHOCYTES RELATIVE PERCENT: 14.6 %
MAGNESIUM: 1.8 MG/DL (ref 1.7–2.4)
MCH RBC QN AUTO: 26.9 PG (ref 27–32)
MCHC RBC AUTO-ENTMCNC: 31.6 G/DL (ref 31–35)
MCV RBC AUTO: 85 FL (ref 80–100)
MONOCYTES ABSOLUTE: 0.4 K/UL (ref 0.2–0.8)
MONOCYTES RELATIVE PERCENT: 5.7 %
NEUTROPHILS ABSOLUTE: 5.9 K/UL (ref 2–7.5)
NEUTROPHILS RELATIVE PERCENT: 78.4 %
O2 SAT, VEN: 99 %
O2 THERAPY: ABNORMAL
PCO2, VEN: 39.8 MMHG (ref 40–50)
PDW BLD-RTO: 15 % (ref 11–16)
PERFORMED ON: ABNORMAL
PH VENOUS: 7.42 (ref 7.35–7.45)
PLATELET # BLD: 330 K/UL (ref 150–400)
PMV BLD AUTO: 10.8 FL (ref 6–10)
PO2, VEN: 157.4 MMHG (ref 25–40)
POTASSIUM REFLEX MAGNESIUM: 3.4 MMOL/L (ref 3.4–5.1)
PROTHROMBIN TIME: 13.8 SEC (ref 11.7–14.6)
RBC # BLD: 4.87 M/UL (ref 3.8–5.8)
SARS-COV-2, NAAT: NOT DETECTED
SODIUM BLD-SCNC: 141 MMOL/L (ref 136–145)
TCO2 CALC VENOUS: 27 MMOL/L
TROPONIN: <0.3 NG/ML
WBC # BLD: 7.5 K/UL (ref 4–11)

## 2021-01-12 PROCEDURE — 71045 X-RAY EXAM CHEST 1 VIEW: CPT

## 2021-01-12 PROCEDURE — 36415 COLL VENOUS BLD VENIPUNCTURE: CPT

## 2021-01-12 PROCEDURE — 96365 THER/PROPH/DIAG IV INF INIT: CPT

## 2021-01-12 PROCEDURE — 80048 BASIC METABOLIC PNL TOTAL CA: CPT

## 2021-01-12 PROCEDURE — U0002 COVID-19 LAB TEST NON-CDC: HCPCS

## 2021-01-12 PROCEDURE — 83735 ASSAY OF MAGNESIUM: CPT

## 2021-01-12 PROCEDURE — 82803 BLOOD GASES ANY COMBINATION: CPT

## 2021-01-12 PROCEDURE — 85025 COMPLETE CBC W/AUTO DIFF WBC: CPT

## 2021-01-12 PROCEDURE — 2500000003 HC RX 250 WO HCPCS: Performed by: EMERGENCY MEDICINE

## 2021-01-12 PROCEDURE — 70450 CT HEAD/BRAIN W/O DYE: CPT

## 2021-01-12 PROCEDURE — 84484 ASSAY OF TROPONIN QUANT: CPT

## 2021-01-12 PROCEDURE — 2580000003 HC RX 258: Performed by: EMERGENCY MEDICINE

## 2021-01-12 PROCEDURE — 85610 PROTHROMBIN TIME: CPT

## 2021-01-12 PROCEDURE — 99291 CRITICAL CARE FIRST HOUR: CPT

## 2021-01-12 RX ORDER — SODIUM CHLORIDE 9 MG/ML
INJECTION, SOLUTION INTRAVENOUS CONTINUOUS
Status: DISCONTINUED | OUTPATIENT
Start: 2021-01-12 | End: 2021-01-12 | Stop reason: HOSPADM

## 2021-01-12 RX ADMIN — NICARDIPINE HYDROCHLORIDE 5 MG/HR: 0.1 INJECTION, SOLUTION INTRAVENOUS at 12:16

## 2021-01-12 RX ADMIN — SODIUM CHLORIDE: 9 INJECTION, SOLUTION INTRAVENOUS at 12:19

## 2021-01-12 ASSESSMENT — PAIN SCALES - PAIN ASSESSMENT IN ADVANCED DEMENTIA (PAINAD)
NEGVOCALIZATION: 0
CONSOLABILITY: 0

## 2021-01-12 NOTE — SIGNIFICANT NOTE
01/12/21 1550   SLP Deferred Reason   SLP Deferred Reason Routine  (D/w RN via telephone who defers SLP evaluation today. SLP will f/u tomorrow pending pt status.)

## 2021-01-12 NOTE — CONSULTS
Stroke Consult Note    Patient Name: Mary Enrique   MRN: [id number]  Age: 75 y.o.  Sex: female  : 1945    Primary Care Physician: Gus Izquierdo MD  Referring Physician:  Demi Greenwood, *    TIME STROKE TEAM CALLED: 1451 EST     TIME PATIENT SEEN: 1451 EST    Handedness: unknown   Race:      Chief Complaint/Reason for Consultation:  Confusion, right upper extremity weakness    HPI:   Mary Enrique is a 75 year old female with known medical diagnosis of prior CVA, blindness, dementia, bed bound, bilateral lower extremity contracture and living in a skilled nursing facility. She is transferred from [facility] for management of left basal ganglia hemorrhage. Patient was last well known at approximately  at 21. Patient was noted to have increased confusion, new onset aphasia and right upper extremity paralysis noticed by aid at 1030. At [facility] initial NIH 26. CT head w/o contrast revealed left basal ganglia hemorrhage without midline shift. Patient was hypertension on presentation 185/90 and placed on nicardipine gtt. Initially Dr. Canela with neurosurgery was called and he did not feel there was any role for surgical intervention. With this knowledge, I discussed with Dr. Lehman the ED physician at [facility] that we would have no acute interventions or treatments to offer on transfer. He expressed that patient's  would like the patient to remain a full code with full intervention and thus needs to be transferred to a stroke center. I agreed and patient was transferred for higher level of care.     Last Known Normal Date/Time:unclear, maybe last night at around     Review of Systems   Unable to perform ROS: Acuity of condition        Neurological Exam  Mental Status  Responsive to painful stimuli.  Patient is not alert. Minimally responsive to painful stimuli. Non-verbal. Does not follow commands .    Cranial Nerves  CN III, IV, VI:   Right pupil: 3 mm.    Left pupil: 4 mm.  CN VII: Grimace appears equal but patient is lacking dentation and this complicates exam .  CN exam complicated by patients condition .    Motor    There is some spontaneous non-purposeful movement with her left upper extremity. There is no movement in her right arm.  Lower extremities are severely contracted with no spontaneous movement.    Reflexes  Left upper extremity reflexes are brisk.  Reflexes in her right upper extremity or legs cannot be elicited..    Coordination  Unable to assess .    Gait  Unable to assess .      Physical Exam  Vitals signs and nursing note reviewed.   Constitutional:       Comments: Frail, chronically ill appearing    HENT:      Head: Normocephalic and atraumatic.      Nose: Nose normal.      Mouth/Throat:      Mouth: Mucous membranes are dry.   Cardiovascular:      Rate and Rhythm: Normal rate.   Pulmonary:      Effort: Pulmonary effort is normal.   Musculoskeletal:      Comments: Bilateral lower extremities contractured   Skin:     General: Skin is warm and dry.         Acute Stroke Data    Alteplase (tPA) Inclusion / Exclusion Criteria    Time: 15:09 EST  Person Administering Scale: NELLA Jhaveri    Inclusion Criteria  [x]   18 years of age or greater   []   Onset of symptoms < 4.5 hours before beginning treatment (stroke onset = time patient was last seen well or without symptoms).   []   Diagnosis of acute ischemic stroke causing measurable disabling deficit (Complete Hemianopia, Any Aphasia, Visual or Sensory Extinction, Any weakness limiting sustained effort against gravity)   []   Any remaining deficit considered potentially disabling in view of patient and practitioner   Exclusion criteria (Do not proceed with Alteplase if any are checked under exclusion criteria)  [x]   Onset unknown or GREATER than 4.5 hours   [x]   ICH on CT/MRI   []   CT demonstrates hypodensity representing acute or subacute infarct   []   Significant head trauma or prior  stroke in the previous 3 months   []   Symptoms suggestive of subarachnoid hemorrhage   []   History of un-ruptured intracranial aneurysm GREATER than 10 mm   []   Recent intracranial or intraspinal surgery within the last 3 months   []   Arterial puncture at a non-compressible site in the previous 7 days   []   Active internal bleeding   []   Acute bleeding tendency   []   Platelet count LESS than 100,000 for known hematological diseases such as leukemia, thrombocytopenia or chronic cirrhosis   []   Current use of anticoagulant with INR GREATER than 1.7 or PT GREATER than 15 seconds, aPTT GREATER than 40 seconds   []   Heparin received within 48 hours, resulting in abnormally elevated aPTT GREATER than upper limit of normal   []   Current use of direct thrombin inhibitors or direct factor Xa inhibitors in the past 48 hours   []   Elevated blood pressure refractory to treatment (systolic GREATER than 185 mm/Hg or diastolic  GREATER than 110 mm/Hg   []   Suspected infective endocarditis and aortic arch dissection   []   Current use of therapeutic treatment dose of low-molecular-weight heparin (LMWH) within the previous 24 hours   []   Structural GI malignancy or bleed   Relative exclusion for all patients  []   Only minor non-disabling symptoms   []   Pregnancy   []   Seizure at onset with postictal residual neurological impairments   []   Major surgery or previous trauma within past 14 days   []   History of previous spontaneous ICH, intracranial neoplasm, or AV malformation   []   Postpartum (within previous 14 days)   []   Recent GI or urinary tract hemorrhage (within previous 21 days)   []   Recent acute MI (within previous 3 months)   []   History of un-ruptured intracranial aneurysm LESS than 10 mm   []   History of ruptured intracranial aneurysm   []   Blood glucose LESS than 50 mg/dL (2.7 mmol/L)   []   Dural puncture within the last 7 days   []   Known GREATER than 10 cerebral microbleeds   Additional  exclusions for patients with symptoms onset between 3 and 4.5 hours.  []   Age > 80.   []   On any anticoagulants regardless of INR  >>> Warfarin (Coumadin), Heparin, Enoxaparin (Lovenox), fondaparinux (Arixtra), bivalirudin (Angiomax), Argatroban, dabigatran (Pradaxa), rivaroxaban (Xarelto), or apixaban (Eliquis)   []   Severe stroke (NIHSS > 25).   []   History of BOTH diabetes and previous ischemic stroke.   []   The risks and benefits have been discussed with the patient or family related to the administration of IV Alteplase for stroke symptoms.   []   I have discussed and reviewed the patient's case and imaging with the attending prior to IV Alteplase.   Not given  Time Alteplase administered       Past Medical History:   Diagnosis Date   • Raheem Bonnet syndrome 2002    Pt. fell out of bed, hit head, and was diagnosed with concussion. Was later diagnosed with Raheem Bonnet Syndrome by Dr. Patel here in [place]   • Diabetes (CMS/HCC)    • History of transfusion     1967, 3 units   • Hypercholesteremia    • Hypertension    • Osteoarthritis      Past Surgical History:   Procedure Laterality Date   • APPENDECTOMY     • BACK SURGERY  2006    DR ORTA   • BACK SURGERY  1996    DR ORTA   • CARPAL TUNNEL RELEASE     • CATARACT EXTRACTION     • CHOLECYSTECTOMY     • HYSTERECTOMY       Family History   Problem Relation Age of Onset   • Heart disease Mother    • Hypertension Mother    • Osteoarthritis Father      Social History     Socioeconomic History   • Marital status:      Spouse name: Not on file   • Number of children: Not on file   • Years of education: Not on file   • Highest education level: Not on file   Tobacco Use   • Smoking status: Never Smoker   • Smokeless tobacco: Never Used   Substance and Sexual Activity   • Alcohol use: No   • Drug use: No   • Sexual activity: Not Currently     Partners: Female     Birth control/protection: None     Allergies   Allergen Reactions   • Thorazine  "[Chlorpromazine] Other (See Comments)     \"has opposite effect\"      Prior to Admission medications    Medication Sig Start Date End Date Taking? Authorizing Provider   amitriptyline (ELAVIL) 50 MG tablet Take  by mouth. 7/1/13   Arnaud Patrick MD   diazepam (VALIUM) 10 MG tablet Take  by mouth. 8/13/13   Arnaud Patrick MD   fentaNYL (DURAGESIC) 75 MCG/HR patch Place  on the skin. 7/25/13   Arnaud Patrick MD   FLUoxetine (PROzac) 20 MG capsule Take 40 mg by mouth daily.    Arnaud Patrick MD   HYDROcodone-acetaminophen (NORCO)  MG per tablet Take 1 tablet by mouth 4 (four) times a day. 9/18/14   Arnaud Patrick MD   lactulose (CHRONULAC) 10 GM/15ML solution Take 10 g by mouth as needed (Pt. has been taking it every morning and sometimes at night lately). 7/12/16   Arnaud Patrick MD   lisinopril (PRINIVIL,ZESTRIL) 2.5 MG tablet Take 2.5 mg by mouth daily. 5/18/16   Arnaud Patrick MD   metFORMIN XR (GLUCOPHAGE-XR) 750 MG 24 hr tablet Take 750 mg by mouth daily with dinner. Pts. Spouse states she takes the ER form.  5/18/16   Anraud Patrick MD   MICROLET LANCETS misc  4/23/14   Arnaud Patrick MD   mirtazapine (REMERON) 15 MG tablet Take 15 mg by mouth every night.    Arnaud Patrick MD   ondansetron (ZOFRAN) 4 MG tablet Take 4 mg by mouth daily as needed for nausea or vomiting.    Arnaud Patrick MD   pantoprazole (PROTONIX) 40 MG EC tablet Take 40 mg by mouth daily. 9/25/14   Arnaud Patrick MD   phenazopyridine (PYRIDIUM) 100 MG tablet Take 1 tablet by mouth 3 (three) times a day with meals. 8/20/16   Dhara Posey APRN   saccharomyces boulardii (FLORASTOR) 250 MG capsule Take 1 capsule by mouth 2 (two) times a day. 8/20/16   Dhara Posey APRN   simvastatin (ZOCOR) 40 MG tablet Take 40 mg by mouth daily. 8/13/13   Arnaud Patrick MD       Hospital Meds:  Scheduled- sodium chloride, 10 mL, Intravenous, " Q12H      Infusions-     PRNs- sodium chloride    Functional Status Prior to Current Stroke/Brianda Score: 5    NIH Stroke Scale  Time: 15:09 EST  Person Administering Scale: NELLA Jhaveri        Results Reviewed:  I have personally reviewed current lab, radiology, and data and agree with results.  Lab Results (last 24 hours)     ** No results found for the last 24 hours. **        Imaging Results (Last 24 Hours)     ** No results found for the last 24 hours. **             Assessment/Plan:  Mary Enrique is a 75 year old female with known medical diagnosis of prior CVA, blindness, dementia, bed bound, bilateral lower extremity contracture and living in a skilled nursing facility. She is transferred from [facility] for management of left basal ganglia hemorrhage     1. Left basal ganglia hemorrhage   -initiate standing hemorrhagic stroke order set   -neurosurgical consult     2. HTN  -management per hemorrhagic stroke order set  -Cardene gtt    3. HLD  -takes simvastatin 40mg at home     4. Functional status   -patient has a MRS5, living in a SNF and bed bound with poor quality of life   -given Dr. Canela's neurosurgical assessment that gives low prospect of meaningful neurologic recovery and patients poor functional status at baseline I would recommend a palliative consultation and a comfort based approach    Discussed plan with nursing staff and intensivist team. Thank you for this consult.     NELLA Jhaveri  January 12, 2021  15:09 EST

## 2021-01-12 NOTE — ED NOTES
Received reports from, Nurse at [facility]. Per reports, Aids reported at 56 today to be aphasic, alert, no following commands. Reports that pt fine at 0830 this morning during med pass and pt was fine. Reports that night shift aids had reported that pt seemed off from herself, but that Nurses didn't note any neuro changes. Reports that Patients base line in : Bed Confined, and blind s/p CVA, is Alert and Oriented x3 at baseline. Contracted, incontinent. Code status: full code. - Minerva Burrell, is Guardian over pt, but that pt is normally able to participate in decision making. Pt has received her regular medication, including Morphone 15mg PO PTA at nursing home    V/S at nursing home:      BP: 158/60   HR: 98 BPM    SpO2 96% on RA.     RR 20        Temp 97.5       Carmen Yu RN  01/12/21 [id number]

## 2021-01-12 NOTE — NURSING NOTE
ACC REVIEW REPORT: [facility]        PATIENT NAME: Mary Enrique    PATIENT ID: [id number]      COVID-19 ACC SCREENING        DOES THE PATIENT HAVE A FEVER GREATER THAN OR EQUAL .4: unknnown    IS THE PATIENT EXPERIENCING SHORTNESS OF BREATH: no    DOES THE PATIENT HAVE A COUGH: no    DOES THE PATIENT HAVE ANY OF THE FOLLOWING RISK FACTORS:    EXPOSURE TO SUSPECTED OR KNOWN COVID-19: unknown     RECENT TRAVEL HISTORY TO ENDEMIC AREA (DOMESTIC/LOCAL): no    IS THE PATIENT A HEALTHCARE WORKER: pt lives in [facility]    HAS THE PATIENT EXPERIENCED A LOSS OF SENSE OF TASTE OR SMELL:  unknown    HAS THE PATIENT BEEN TESTED FOR COVID-19: yes    DATE TESTED: 1-12-20    LAB TESTING SENT TO: results pending      BED: N 235    BED TYPE: ICu    BED GIVEN TO: Loida GARNER BED GIVEN: 1325    TODAY'S DATE: 1/12/2021    TRANSFER DATE: 1-12-21    ETA: after 1500    TRANSFERRING FACILITY: [facility]    TRANSFERRING FACILITY PHONE # : [phone]    TRANSFERRING MD: Bart Tineo    ACCEPTING PROVIDER: ANIKET Roy    NEUROLOGY PHYSICIAN: Serina    DATE/TIME REQUEST RECEIVED: 1-12-21@12:24    [facility] RN: Roxanne Pisano    REPORT FROM: Loida    TIME REPORT TAKEN: 1302    DIAGNOSIS: hemorrhagic CVA of basal ganglia    REASON FOR TRANSFER TO [facility]: higher level of care    TRANSPORTATION: EMS    CLINICAL REASON FOR TRANSFER TO [facility]: 75 y.o. presented to local ED from [facility] around 11:45 with new aphasia  Pt has a hx of CVA with LE contractures, is bed bound, legally blind, mild dementia and incontinence  CT showed hemorrhage at left basal ganglia, no midline shift    CLINICAL INFORMATION    HEIGHT:     WEIGHT: 113#    ALLERGIES: chlorpromazine, ASA, ibuprofen    INFECTIOUS DISEASE: MDRO - e-coli    ISOLATION:     VITAL SIGNS:   TIME: 1300  TEMP: 96.8 (scanned)  PULSE: 97  B/P: 187/91  RESP: 20      LAB INFORMATION: WBC 7.5, H&H: 13/41, plt 330, glucose fsbs 130, PT/INR: 13.8/1.00, Ca++  "\"normal\", other lab results are pending    CULTURE INFORMATION:     MEDS/IV FLUIDS: #18 left ac, #20 rt forearm  cardene gtt to be increased to 10mg ~1330  NS @125  Pt takes morphine sulfate XL 15mg bid      CARDIAC SYSTEM:    CHEST PAIN: none reported    RHYTHM: NSR    Is patient taking or has patient been given any drugs that could increase bleeding? no      TROPONIN:  \"normal\"    CARDIAC NOTES: BP is elevated      RESPIRATORY SYSTEM:    LUNG SOUNDS: clear, diminished    OXYGEN: no    O2 SAT: 98% on RA    IMAGING FINDINGS: CXR no acute process    RESPIRATORY STATUS: no soa      CNS/MUSCULOSKELETAL      LAST KNOWN WELL: 2230 1/11/21 vs. 0630 this morning   Aide reported that pt had some confusion last night; this morning she was found to be aphasic and having flaccid RUE ~ 10:30          NIHSS    Survey Item  0: Means Alert  1: Drowsy or Answer Correctly  2: Incorrect, Forced, Can't Resist Gravity  3: Complete or No Effort  4: No Movement  NT: Not Testable Acceptable As Noted Above      1A: Level of Consciousness: 1    1B: LOC Questions (month, age) : 2    1C: LOC Commands (open/close eyes, make a fist & let go): 1    2:  Best Gaze (eyes open-pt follows examiner's fingers or face): 0    3:  Visual (introduce visual stimulus/threat to pt's visual field quad. Cover 1 eye and hold up fingers in all 4 quadrants) : 3    4.  Facial Palsy (show teeth, raise eyebrows and squeeze eyes tightly shut): 0    5A: Motor Arm-Left (elevate extremity to 90 degrees and score drift/movement.  Count to 10 aloud and use fingers for visual cue): 2    5B:  Motor Arm-Right (elevate extremity to 90 degrees and score drift/movement.  Count to 10 aloud and use fingers for visual cue): 4    6A:  Motor Leg-Left (elevate extremity to 30 degrees and score drift/movement.  Count to 5 out loud and use fingers for visual cue): 4    6B:  Motor Leg-Right (elevate extremity to 30 degrees and score drift/movement.  Count to 5 out loud and use fingers " for visual cue): 4    7:  Limb Ataxia- finger to nose, heel down shin: 0    8:  Sensory- pin prick to face, arms, trunk, and legs. Compare sharpness side to side: 2    9:  Best Language- name, items, describe picture, and read sentences.  Do not forget glasses if they normally wear them: 3    10: Dysarthria- elevate speech clarity by pt reading or repeating words on a list: 0    11: Extinction and Inattention- Use information from prior testing or double simultaneous stimuli testing to identify neglect. Face, arms, legs and visual field: 0    Total NIHSS Score: 24  Date: 1/12/21  Time of NIHSS Assessment: 11:45        SIZE OF HEMORRHAGE: 3.1 x 2.7 cm    CAT SCAN RESULTS: hemorrhage left basal ganglia, no midline shift    CNS/MUSCULOSKELETAL NOTES: pt is mute; eyes are closed; would follow command when checking  and would nod her head yes/no to questions  She is usually a&o but total assistance needed with ADL's  She is legally blind  RUE is flaccid (usually 5/5)      GI//GY      ABDOMINAL PAIN: unknown    VOMITING: no    DIARRHEA: no    NAUSEA: no    GI//GY NOTES: unknown diet    AYERS: no    PAST MEDICAL HISTORY: CVA, GI bleed, DM, HLD, HTN, blind, dementia, back pain, opiod abuse, anxiety/depression, GERD, constipation, clementina bonnet syndrome, dysphasia, anemia  Surgeries: appendix, back, breast lumpectomy, GB, hysterectomy, knee, carpal tunnel release, bladder    OTHER SYMPTOM NOTES: skin not assessed    ADDITIONAL NOTES: pt is a full code; Dr Lyon was consulted - no surgical intervention indicated          Joan Pisano RN  1/12/2021  13:25 EST

## 2021-01-12 NOTE — ED PROVIDER NOTES
[address] [facility] ENCOUNTER      Pt Name: Marilee Aviles  MRN: [id number]  [place] 1945  Date of evaluation: 1/12/2021  Provider: Marlon Berger DO    CHIEF COMPLAINT       Chief Complaint   Patient presents with    Altered Mental Status         HISTORY OF PRESENT ILLNESS   (Location/Symptom, Timing/Onset, Context/Setting, Quality, Duration, Modifying Factors, Severity)  Note limiting factors. Marilee Aviles is a 66 y.o. female who presents to the emergency department with complaint of altered mental status. History is limited at this time as patient has dementia and currently nonverbal.  Multiple stories were told from the nursing home. Per one of the nursing home aids the patient began to seem off last night at some time before bed. EMS reports this morning around 6:30 AM the patient was woken up and appeared close to her baseline and was given her morning medications including 15 mg of morphine. She has been on this chronically for quite some time. The nursing home staff then told our nursing staff that the patient was last seen at [phone] and appeared close to baseline. Patient reportedly has history of stroke, dementia, blindness, unknown previous deficits. She is contracted and bedbound. They states she normally will flail her arms around and talk with staff however is confused at baseline. Today at 10:30 AM noted to be nonverbal and more lethargic. Not moving her extremities. She is normally contracted in the lower extremities. EMS was called and she was sent to the hospital for further evaluation. Does have history of recent GI bleeds in December and November. Not on anticoagulation. Blood sugar was around 150 for EMS.         Appropriate PPE was used including an eye shield, gloves, N95 mask, surgical mask during the entire patient encounter and exam.  If necessary (pt being intubated or aerosolizing equipment in use) a gown was also used.    Nursing Notes were reviewed.       PAST MEDICAL HISTORY     Past Medical History:   Diagnosis Date    Anemia     Anxiety     Blind     Narayan Bonnet syndrome     Chronic back pain     Constipation     [facility]     Depression     Diabetes mellitus ([facility]     Dysphagia     GERD (gastroesophageal reflux disease)     GI bleed     History of opioid abuse ([facility]     Hyperlipidemia     Hypertension     Insomnia     Interstitial cystitis     Retinitis pigmentosa     UTI (urinary tract infection)     Vitamin D deficiency     Vitamin deficiency          SURGICAL HISTORY       Past Surgical History:   Procedure Laterality Date    APPENDECTOMY      BACK SURGERY      x2    BLADDER SURGERY      multiple bladder surgeries    BREAST SURGERY      lump removed    CARPAL TUNNEL RELEASE Bilateral     CHOLECYSTECTOMY      COLONOSCOPY N/A 7/8/2019    COLONOSCOPY POLYPECTOMY SNARE/COLD BIOPSY performed by Anna Mtz MD at [address] GASTROINTESTINAL ENDOSCOPY N/A 7/8/2019    EGD BIOPSY performed by Anna Mtz MD at [address]       Previous Medications    ACETAMINOPHEN (TYLENOL) 650 MG SUPPOSITORY    Place 650 mg rectally every 6 hours as needed for Fever or Pain     BISACODYL (DULCOLAX) 10 MG SUPPOSITORY    Place 10 mg rectally daily as needed for Constipation Use if no bowel movement in 4 days    CHOLECALCIFEROL (VITAMIN D) 25 MCG TABS    Take 1 tablet by mouth daily    CYCLOBENZAPRINE (FLEXERIL) 5 MG TABLET    Take 5 mg by mouth daily    CYPROHEPTADINE (PERIACTIN) 4 MG TABLET    Take 8 mg by mouth 3 times daily     DONEPEZIL (ARICEPT) 10 MG TABLET    Take 10 mg by mouth nightly    FERROUS SULFATE (IRON 325) 325 (65 FE) MG TABLET    Take 1 tablet by mouth 3 times daily (with meals)    GLYCERIN MOISTURIZING MOUTH SPRAY (OASIS) 35 % LIQD    Take 2 sprays by mouth 4 times daily    MAGNESIUM Substance and Sexual Activity    Alcohol use: No    Drug use: No    Sexual activity: None   Lifestyle    Physical activity     Days per week: None     Minutes per session: None    Stress: None   Relationships    Social connections     Talks on phone: None     Gets together: None     Attends Hinduism service: None     Active member of club or organization: None     Attends meetings of clubs or organizations: None     Relationship status: None    Intimate partner violence     Fear of current or ex partner: None     Emotionally abused: None     Physically abused: None     Forced sexual activity: None   Other Topics Concern    None   Social History Narrative    None       SCREENINGS   NIH Stroke Scale  Level of Consciousness (1a. ): Not alert, but arousable by minor stimulation to obey  LOC Questions (1b. ): Answers neither question correctly  LOC Commands (1c. ): Performs one task correctly  Best Gaze (2. ): Normal  Visual (3. ): (!) Bilateral hemianopia  Facial Palsy (4. ): Normal symmetrical movement  Motor Arm, Left (5a. ): Some effort against gravity  Motor Arm, Right (5b. ): No movement  Motor Leg, Left (6a. ): No movement  Motor Leg, Right (6b. ): No movement  Limb Ataxia (7. ): Absent  Sensory (8. ): (!) Severe to total loss  Best Language (9. ): Mute  Dysarthria (10. ): Normal  Extinction and Inattention (11): No abnormality  Total: 26           Review of Systems  Unable to assess    Except as noted above the remainder of the review of systems was reviewed and negative.        PHYSICAL EXAM    (up to 7 for level 4, 8 or more for level 5)     ED Triage Vitals [01/12/21 1148]   BP Temp Temp src Pulse Resp SpO2 Height Weight   -- -- -- 99 12 98 % -- --       General appearance: Cachectic, followed simple commands of being able to shake her head as well as normal  strength in the left hand  HENT: normocephalic, atraumatic, oropharynx dry  Eyes: 2 to 3 mm and slightly reactive  Neck: normal range of motion, no tenderness, trachea midline, no stridor  Respiratory: normal breath sounds, non labored breathing pattern  Cardiovascular: normal heart rate, normal rhythm  GI: nontender, bowel sounds normal, soft, nondistended, no pulsatile masses  Musculoskeletal: Contracted lower extremities, no edema, 2+ DP and radial pulses bilaterally  Integument: warm, dry, no erythema, no rash, < 2 second cap refill  Neurologic: Eyes closed, follow simple commands such as shaking head and 5 out of 5  strength in the left hand, right upper extremity completely flaccid, aphasic and nonverbal, contracted lower extremities     NIH STROKE SCALE    Time Performed:  11:52    1a. Level of consciousness:  2 - not alert, requires repeated stimulation to attend, or is obtunded and requires strong or painful stimulation to make movements (not stereotyped)   1b. Level of consciousness questions:  1 - answers one question correctly  1c. Level of consciousness questions:  2 - performs neither task correctly  2. Best Gaze:  0 - normal  3. Visual:  3 - bilateral hemianopia (blind including cortical blindness  4. Facial Palsy:  0 - normal symmetric movement  5a. Motor left arm:  2 - some effort against gravity, limb cannot get to or maintain (if cued) 90 (or 45) degrees, drifts down to bed, but has some effort against gravity   5b. Motor right arm:  4 - no movement  6a. Motor left le - no movement  6b. Motor right le - no movement  7. Limb Ataxia:  0 - absent  8. Sensory:  1 - mild to moderate sensory loss; patient feels pinprick is less sharp or is dull on the affected side; there is a loss of superficial pain with pinprick but patient is aware of being touched   9. Best Language:  3 - mute, global aphasia; no usable speech or auditory comprehension  10. Dysarthria:  0 - normal  11.   Extinction and Inattention:  0 - no abnormality    TOTAL:  26      DIAGNOSTIC RESULTS       EKG: Normal sinus rhythm, rate 75, normal QRS, normal QT, no ST depression or elevation, normal T wave    All EKG's are interpreted by the Emergency Department Physician who either signs or Co-signs this chart in the absence of a cardiologist.      RADIOLOGY:   Interpretation per the Radiologist below, if available at the time of this note:    XR CHEST PORTABLE   Final Result   Impression: No acute cardiopulmonary abnormality. No significant change. CT Head WO Contrast   Final Result      Hemorrhagic stroke of the left basal ganglia. Findings discussed with Dr. Fatimah Leong at 1210 hours.              LABS:  Labs Reviewed   CBC WITH AUTO DIFFERENTIAL - Abnormal; Notable for the following components:       Result Value    MCH 26.9 (*)     MPV 10.8 (*)     Lymphocytes Absolute 1.1 (*)     All other components within normal limits    Narrative:     Performed at:  [address] [place] [address] Laboratory  [place], [address]   Phone [phone]   BASIC METABOLIC PANEL W/ REFLEX TO MG FOR LOW K - Abnormal; Notable for the following components:    Glucose 136 (*)     BUN 33 (*)     All other components within normal limits    Narrative:     Performed at:  [address] [place] [address] Laboratory  [address] [place] [address]  [place], [address]   Phone [phone]   BLOOD GAS, VENOUS - Abnormal; Notable for the following components:    pCO2, Umesh 39.8 (*)     pO2, Umesh 157.4 (*)     All other components within normal limits    Narrative:     Performed at:  [address] [place] [address] Laboratory  [address] [place] [address]  [place], [address]   Phone [phone]   POCT GLUCOSE - Abnormal; Notable for the following components:    POC Glucose 130 (*)     All other components within normal limits    Narrative:     Performed at:  [address] [place] [address] Laboratory  [address] [place] [address],  [place], [address]   Phone [phone]   POCT GLUCOSE - Normal   TROPONIN Narrative:     Performed at:  [address] [facility]  [facility] [address]   Phone [phone]   PROTIME-INR    Narrative:     Performed at:  [address] [facility]  [address] [place], [place] [address] [address]   Phone [phone]    Narrative:     Performed at:  [facility]  [address] [place] [address],  [place], [place]   Phone [phone]   URINE RT REFLEX TO CULTURE   MAGNESIUM       All other labs were within normal range or not returned as of this dictation. EMERGENCY DEPARTMENT COURSE and DIFFERENTIAL DIAGNOSIS/MDM:   Vitals:    Vitals:    01/12/21 1215 01/12/21 1230 01/12/21 1245 01/12/21 1300   BP: (!) 182/94 (!) 180/102 (!) 179/93 (!) 187/91   Pulse: 91 98 101 107   Resp: 13 15 19 14   Temp:       TempSrc:       SpO2: 96% 98% 98% 98%   Weight:    113 lb (51.3 kg)         MDM  75-year-old female presents the emergency department with decreased responsiveness and concern for possible stroke. Vital signs stable. Physical exam as above. Patient arrived with decreased responsiveness and able to follow some commands such as left hand  strength as well as nod her head yes or no. She states she is not in any pain. Other than that she is nonverbal at this time. Normally yells out at the nursing home but is chronically contracted. Unsure what deficit she has from previous CVA. Last known well time was either last night at 1030, this morning at 630 or this morning at 8:30 AM.  Unfortunately her arrival and CT head will put her over the 4-1/2-hour window for TPA as well as recent GI bleeds are a slight contraindication. Does not appear to be a good candidate for TPA at this time. Will obtain CT head and CTA head and neck. Accu-Chek 130. CT head shows a hemorrhagic stroke of the left basal ganglia. No midline shift.   Will place patient on nicardipine drip with goal systolic blood pressure between 140 and 160. Patient will need to be transferred to [facility] for neurology and possibly neurosurgery evaluation. I did discuss with the  who states the patient is normally alert and oriented x3. States he has not been able to see her since March because of the pandemic. Does report he still wants her to be full code with all life-saving interventions performed. Discussed with Dr. Danika Hartman who states that patient is not a surgical candidate and he does not accept the patient. States if we would like to discuss with the intensivist patient could possibly be monitored in the ICU but needs no emergent intervention. We will page the intensivist at [facility] as well as discuss with [facility]. Discussed with Eugenio Ledbetter from the stroke team at [facility] who accepts the patient to the ICU. Patient be accepted under the hospitalist Dr. Fuad Us. She will discuss the patient with him and there is no need for me to have a discussion with the hospitalist at this time. ED crit    CRITICAL CARE:  The high probability of sudden, clinically significant deterioration in the patient's condition required the highest level of my preparedness to intervene urgently. The services I provided to this patient were to treat and/or prevent clinically significant deterioration that could result in:neurological collapse. Services included the following: chart data review, reviewing nursing notes and/or old charts, documentation time, consultant collaboration regarding findings and treatment options, medication orders and management, direct patient care, re-evaluations, vital sign assessments and ordering, interpreting and reviewing diagnostic studies/lab tests.     Aggregate critical care time was 35 minutes, which includes only time during which I was engaged in work directly related to the patient's care, as described above, whether at the bedside or elsewhere in the Emergency Department. It did not include time spent performing other reported procedures or the services of residents, students, nurses or physician assistants. CONSULTS:  None      FINAL IMPRESSION      1. Hemorrhagic stroke [facility])          DISPOSITION/PLAN   DISPOSITION Decision To Transfer 01/12/2021 12:19:59 PM      PATIENT REFERRED TO:  No follow-up provider specified.     DISCHARGE MEDICATIONS:  New Prescriptions    No medications on file          (Please note that portions of this note were completed with a voice recognition program.  Efforts were made to edit the dictations but occasionally words are mis-transcribed.)    Marlon Berger DO (electronically signed)  Attending Emergency Physician      Marlon Berger DO  01/12/21 [address] [employer] [address], DO  01/12/21 1210

## 2021-01-12 NOTE — PLAN OF CARE
Goal Outcome Evaluation:  Plan of Care Reviewed With: patient  Progress: no change  Outcome Summary: Pt admitted from outlying facility for basal ganglia hemorrhage. Upon arrival NIH was 30. Cardene was started to maintain appropriate blood pressure parameters, emery catheter was placed, and potassium was replaced. Family was notified by MD for prognosis and goals of care. All other vitals remain stable at this time.

## 2021-01-12 NOTE — H&P
Pulmonary/Critical Care History and Physical Exam     LOS: 0 days   Patient Care Team:  Gus Izquierdo MD as PCP - General    Chief Complaint: Strokelike symptoms    Subjective     HPI:   Mary Enrique is a 75 y.o. female being transferred to [facility] from [facility] on 1/12 for management of left basal ganglia hemorrhage.     The patient is a nursing home resident with history of CVA who is bed bound, legally blind, demented, with lower extremities contractures. She presented to OSH ED for evaluation of new onset aphasia and right upper extremity paralysis noted at 1030 AM. The nursing aid reportedly noticed increased confusion last evening as well. NIH was 24 and CT imaging that displayed a left basal ganglia hemorrhage without midline shift. Labs were reportedly benign and she was placed on Cardene infusion for hypertension. Case was discussed with Dr. Canela who did not feel there was any role for surgical intervention. She will be admitted to the ICU for higher level of care.     Patient is unable to relay any history.    Of note she is a FULL CODE.     COVID-19 testing at OSH pending.     History taken from: patient/chart     Past Medical History:   Diagnosis Date   • Raheem Bonnet syndrome 2002    Pt. fell out of bed, hit head, and was diagnosed with concussion. Was later diagnosed with Raheem Bonnet Syndrome by Dr. Patel here in [place]   • Diabetes (CMS/[facility])    • History of transfusion     1967, 3 units   • Hypercholesteremia    • Hypertension    • Osteoarthritis        Past Surgical History:   Procedure Laterality Date   • APPENDECTOMY     • BACK SURGERY  2006    DR ORTA   • BACK SURGERY  1996    DR ORTA   • CARPAL TUNNEL RELEASE     • CATARACT EXTRACTION     • CHOLECYSTECTOMY     • HYSTERECTOMY         Family History   Problem Relation Age of Onset   • Heart disease Mother    • Hypertension Mother    • Osteoarthritis Father        Social History     Socioeconomic History   • Marital  "status:      Spouse name: Not on file   • Number of children: Not on file   • Years of education: Not on file   • Highest education level: Not on file   Tobacco Use   • Smoking status: Never Smoker   • Smokeless tobacco: Never Used   Substance and Sexual Activity   • Alcohol use: No   • Drug use: No   • Sexual activity: Not Currently     Partners: Female     Birth control/protection: None       Allergies   Allergen Reactions   • Thorazine [Chlorpromazine] Other (See Comments)     \"has opposite effect\"        PMH/FH/SocH were reviewed by me and updates were made.     Review of Systems:    Review of 14 systems was completed with positives and pertinent negatives noted in the subjective section.  All other systems reviewed and are negative.   Exceptions are noted below:    Unable to obtain secondary to speech difficulty.    Objective     Vital Signs  BP: ()/()   Arterial Line BP: ()/()   There is no height or weight on file to calculate BMI.       Physical Exam:     General Appearance:   Obtunded, chronically ill-appearing, contracted, in no acute distress   Head:    Normocephalic, without obvious abnormality, atraumatic   Eyes:            Lids and lashes normal, eyes closed but opens them slightly to voice.  Conjunctivae and sclerae normal, no   icterus, no pallor, corneas clear, PERRL, left eye deviated outward.    ENMT:   Ears appear intact with no abnormalities noted      No oral lesions, no thrush, oral mucosa dry   Neck:   No adenopathy, supple, trachea midline, no thyromegaly, no   carotid bruit, no JVD       Lungs/resp:     Normal effort, symmetric chest rise, no crepitus, clear to      auscultation bilaterally, no chest wall tenderness                  Heart/CV:    Regular rhythm and normal rate, normal S1 and S2, no            murmur   Abdomen/GI:     Normal bowel sounds, no masses, no organomegaly, soft        non-tender, non-distended   G/U:     Deferred   Extremities/MSK:   No clubbing, cyanosis or " edema.  Contracted lower extremities.    Pulses:   Pulses palpable and equal bilaterally   Skin:   No bleeding, bruising or rash   Hem/Lymph:   No cervical or supraclavicular adenopathy.    Neurologic:   Contracted extremities.  Decerebrate posturing on right.  Obtunded.            Psychiatric:  Non-agitated.   Findings are documentation my personal physical examination.  Electronically signed by:Noé Ugarte MD 01/12/21 17:45 EST    Interval: baseline  1a. Level of Consciousness: 2-->Not alert, requires repeated stimulation to attend, or is obtunded and requires strong or painful stimulation to make movements (not stereotyped)  1b. LOC Questions: 2-->Answers neither question correctly  1c. LOC Commands: 2-->Performs neither task correctly  2. Best Gaze: 2-->Forced deviation, or total gaze paresis not overcome by the oculocephalic maneuver  3. Visual: 3-->Bilateral hemianopia (blind including cortical blindness)  4. Facial Palsy: 2-->Partial paralysis (total or near-total paralysis of lower face)  5a. Motor Arm, Left: 1-->Drift, limb holds 90 (or 45) degrees, but drifts down before full 10 seconds, does not hit bed or other support  5b. Motor Arm, Right: 3-->No effort against gravity, limb falls  6a. Motor Leg, Left: 3-->No effort against gravity, leg falls to bed immediately  6b. Motor Leg, Right: 3-->No effort against gravity, leg falls to bed immediately  7. Limb Ataxia: 0-->Absent  8. Sensory: 1-->Mild-to-moderate sensory loss, patient feels pinprick is less sharp or is dull on the affected side, or there is a loss of superficial pain with pinprick, but patient is aware of being touched  9. Best Language: 3-->Mute, global aphasia, no usable speech or auditory comprehension  10. Dysarthria: 2-->Severe dysarthria, patients speech is so slurred as to be unintelligible in the absence of or out of proportion to any dysphasia, or is mute/anarthric  11. Extinction and Inattention (formerly Neglect): 1-->Visual,  tactile, auditory, spatial, or personal inattention or extinction to bilateral simultaneous stimulation in one of the sensory modalities    Total (NIH Stroke Scale): 30   Results Review:     I reviewed the patient's new clinical results.         Invalid input(s): LABALKYREE DUQUE        Invalid input(s): NEUTOPHILPCT,  EOSPCT        I reviewed the patient's new imaging including images and reports.    Medication Review:     No current facility-administered medications for this encounter.       Assessment/Plan     Active Hospital Problems    Diagnosis   • **L basal ganglia ICH    • T2DM    • Hypertension   • Chronic UTI (MDR E. Coli)    • Dyslipidemia   • Dementia    • LE contractures    • Bedbound   • Nursing home resident   • H/O CVA    • Chronic pain   • Legally blind   • Raheem Bonnet syndrome     75 y.o. chronically debilitated female with mild dementia is a nursing home resident is transferred for left basal ganglia ICH.  She is currently obtunded and unable to relay any history.  By report she is verbal at baseline.  She was evaluated by neurosurgery who did not feel she is a candidate for any surgical intervention.    We need to determine goals of care for this patient with extremely poor prognosis of meaningful recovery.  We tried multiple times and have been unable to reach her .  She will therefore remain full code.  Per report, he has stated she is to be full code, full support.  I will consult palliative care for additional discussion given her poor prognosis and likelihood of extremely poor quality of life going forward.  It seems that hospice would be a reasonable option going forward.    Plan:    1. Admit to ICU   2. Hemorrhagic stroke order set  3. Neurosurgery and neurology consults  4. Nicardipine as needed  5. COVID-19 testing   6. Blood culture X2 and UA- will defer ABX pending labs   7. Correction insulin   8. Mechanical VTE PPX   9. Pepcid PUD PPX    Kaycee Harvey, APRN, AGACNP-BC, FNP-BC    Pulmonary and Critical Care   I performed an independent history and physical examination. Portions of the history were obtained by APRN and were modified by me according to my findings. The above note reflects my findings, assessment, and plan.    Noé Ugarte MD    The patient is critically ill secondary to ICH with tenuous respiratory and neurologic status and has high likelihood of life-threatening decline in condition which could include respiratory failure, brain herniation, or death.  As such, the patient requires continuous monitoring and frequent reassessment for consideration of adjustment in management to minimize this risk.  I personally reassessed her multiple times today.    Critical care time : 37 minutes.(This excludes time spent performing separately reportable procedures and services). including high complexity decision making to assess, manipulate, and support vital organ system failure in this individual who has impairment of one or more vital organ systems such that there is a high probability of imminent or life threatening deterioration in the patient’s condition.    Electronically signed by:  Noé Ugarte MD  01/12/21  17:56 EST

## 2021-01-12 NOTE — ED NOTES
CT advised via radio. Will be delayed as a PT is currently on the table at this time.       [facility] Sella  01/12/21 1140

## 2021-01-12 NOTE — ED NOTES
Per md pt is not a accepted as surgical pt by neuro surgery per md.  We will try [facility] icu.   I also called access center spoke with Jeanna Morfin rn and advised that we would also like uk contacted for transfer      Deshaun Pepe, KATALINA  01/12/21 8024

## 2021-01-12 NOTE — ED NOTES
Dr. Leland Nissen on the phone at this time with [facility], Dr. Myron Spaulding.       Klever Up  01/12/21 6363

## 2021-01-12 NOTE — ED NOTES
Call to lab, spoke to [facility] is cmp complete yet     Nora Morales, RN  01/12/21 2721

## 2021-01-12 NOTE — ED NOTES
Received reports Via phone from [facility]. EMS reports called to [address], for unresponsive female, change from baseline. V/S:    180/101        HR 90 BPM SR    SpO2 95% on RA.       Carmen Yu RN  01/12/21 0061

## 2021-01-12 NOTE — CONSULTS
NEUROSURGERY CONSULTATION    Referring Provider: Demi Greenwood, *    Patient Care Team:  Gus Izquierdo MD as PCP - General    Chief Complaint: Language dysfunction.    History of Present Illness: Ms. Enrique is a 75-year-old woman who resides in a nursing home who has a history of stroke, diabetes, hypertension, blindness, dementia, back pain, opioid abuse, anxiety/depression who apparently developed diminished language function earlier today and was taken to [facility] where she was noted to have a left basal ganglia hemorrhage.  She was transferred to this facility for further evaluation.  The patient is bedbound and has lower extremity contractures at baseline.  No family is present.  History is gleaned from the chart.      Review of Systems:  Musculoskeletal and Neurological systems not be reviewed given her diminished level of consciousness.    History:  Past Medical History:   Diagnosis Date   • Raheem Bonnet syndrome 2002    Pt. fell out of bed, hit head, and was diagnosed with concussion. Was later diagnosed with Raheem Bonnet Syndrome by Dr. Patel here in [place]   • Diabetes (CMS/[facility])    • History of transfusion     1967, 3 units   • Hypercholesteremia    • Hypertension    • Osteoarthritis    ,   Past Surgical History:   Procedure Laterality Date   • APPENDECTOMY     • BACK SURGERY  2006    DR ORTA   • BACK SURGERY  1996    DR ORTA   • CARPAL TUNNEL RELEASE     • CATARACT EXTRACTION     • CHOLECYSTECTOMY     • HYSTERECTOMY     ,   Family History   Problem Relation Age of Onset   • Heart disease Mother    • Hypertension Mother    • Osteoarthritis Father    ,   Social History     Tobacco Use   • Smoking status: Never Smoker   • Smokeless tobacco: Never Used   Substance Use Topics   • Alcohol use: No   • Drug use: No   ,   Medications Prior to Admission   Medication Sig Dispense Refill Last Dose   • amitriptyline (ELAVIL) 50 MG tablet Take  by mouth.      • diazepam  "(VALIUM) 10 MG tablet Take  by mouth.      • fentaNYL (DURAGESIC) 75 MCG/HR patch Place  on the skin.      • FLUoxetine (PROzac) 20 MG capsule Take 40 mg by mouth daily.      • HYDROcodone-acetaminophen (NORCO)  MG per tablet Take 1 tablet by mouth 4 (four) times a day.      • lactulose (CHRONULAC) 10 GM/15ML solution Take 10 g by mouth as needed (Pt. has been taking it every morning and sometimes at night lately).      • lisinopril (PRINIVIL,ZESTRIL) 2.5 MG tablet Take 2.5 mg by mouth daily.      • metFORMIN XR (GLUCOPHAGE-XR) 750 MG 24 hr tablet Take 750 mg by mouth daily with dinner. Pts. Spouse states she takes the ER form.       • MICROLET LANCETS misc       • mirtazapine (REMERON) 15 MG tablet Take 15 mg by mouth every night.      • ondansetron (ZOFRAN) 4 MG tablet Take 4 mg by mouth daily as needed for nausea or vomiting.      • pantoprazole (PROTONIX) 40 MG EC tablet Take 40 mg by mouth daily.      • phenazopyridine (PYRIDIUM) 100 MG tablet Take 1 tablet by mouth 3 (three) times a day with meals.  0    • saccharomyces boulardii (FLORASTOR) 250 MG capsule Take 1 capsule by mouth 2 (two) times a day.      • simvastatin (ZOCOR) 40 MG tablet Take 40 mg by mouth daily.       Allergies:  Thorazine [chlorpromazine]      Physical Exam:  Vital Signs: Blood pressure 142/75, pulse 116, temperature 97.7 °F (36.5 °C), temperature source Axillary, resp. rate 20, height 160 cm (62.99\"), weight 51.2 kg (112 lb 14 oz), SpO2 95 %.  The patient is thin and appears her stated age or older.  Her head is atraumatic.  Her neck is rigid.  She is not awake.  She makes some unclear vocalizations.  She does not follow commands.  She is not oriented.  There is some spontaneous \"picking and grabbing\" with her left upper extremity.  There is no movement in her right arm.  Lower extremities are severely contracted with no spontaneous movement.  Left upper extremity reflexes are brisk.  Reflexes in her right upper extremity or legs " cannot be elicited.  Her left pupil is slightly larger than the right pupil.  Pupils are poorly reactive.  Facial symmetry is preserved.  Tar dive dyskinesia type movements are noted around her mouth.      Data Review:  CT of the brain demonstrates an oblong acute subcortical ICH.  There is very slight effacement of the body of the left lateral ventricle.  There is no global mass-effect.  There is diffuse atrophy.    Results from last 7 days   Lab Units 01/12/21  1520   SODIUM mmol/L 141   POTASSIUM mmol/L 3.0*   CHLORIDE mmol/L 101   CO2 mmol/L 25.0   BUN mg/dL 35*   CREATININE mg/dL 0.66   GLUCOSE mg/dL 216*   CALCIUM mg/dL 10.1     Results from last 7 days   Lab Units 01/12/21  1520   WBC 10*3/mm3 13.73*   HEMOGLOBIN g/dL 13.5   HEMATOCRIT % 43.9   PLATELETS 10*3/mm3 450     PT/INR: 14.4/1.15    Diagnosis:  1.  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  2.  History of stroke.  3.  History of mild dementia.  4.  History of general debilitation.    Treatment Recommendations:  This patient is moribund.  At baseline this patient is very poorly functioning.  In the setting of her current hemorrhage her prospects for meaningful neurologic recovery are quite remote.  There is certainly no role for surgical intervention.    ICH score: 1    Gabino Canela MD  01/12/21  16:11 EST

## 2021-01-12 NOTE — SIGNIFICANT NOTE
"Called both the  and daughter with update regarding the patient's current status. Informed them of the patient's diagnosis of ICH with no role for intervention per neurosurgery. After a lengthy discussion they inform me that she has gradually deteriorated over the past 3-4 years and is \"hardly living at this point\". They wish to forgo heroic measures including intubation, dialysis, and cardioversion/defibrillation therefore she will be made a DNI/DNR code status. The  does express that if she fails to demonstrate any improvement he \"just wants to keep her comfortable\". Palliative care consult placed for the morning. I have also informed them of our visitation policy as they have not been able to visit her over the past multiple months due to the pandemic.     Kaycee Harvey, APRN, AGACNP-BC, FNP-BC   Pulmonary and Critical Care     "

## 2021-01-12 NOTE — ED NOTES
Dr. Bruce Reyes on the phone with [facility] at this time.       Kirsten Kaur  01/12/21 2920

## 2021-01-12 NOTE — ED NOTES
Report to teresita haddad. She advised that they would remove pt mayo upon arrival so that it was not necessary for me to place one.  md aware.   Ok to not collect urine sample at this time     Deshaun Pepe RN  01/12/21 2168

## 2021-01-13 NOTE — CONSULTS
Gus Izquierdo MD  Consulting physician: Shanel    No chief complaint on file.        HPI: Patient is a 75-year-old female brought in hospital from nursing facility with altered mental status.  Patient was found to have intracranial hemorrhage.  Was admitted to intensive care unit.  Patient has remained unarousable.      Past Medical History:   Diagnosis Date   • Raheem Bonnet syndrome 2002    Pt. fell out of bed, hit head, and was diagnosed with concussion. Was later diagnosed with Raheem Bonnet Syndrome by Dr. Patel here in [place]   • Diabetes (CMS/[facility])    • History of transfusion     1967, 3 units   • Hypercholesteremia    • Hypertension    • Osteoarthritis      Past Surgical History:   Procedure Laterality Date   • APPENDECTOMY     • BACK SURGERY  2006    DR ORTA   • BACK SURGERY  1996    DR ORTA   • CARPAL TUNNEL RELEASE     • CATARACT EXTRACTION     • CHOLECYSTECTOMY     • HYSTERECTOMY       Current Code Status     Date Active Code Status Order ID Comments User Context       1/12/2021 1824 No CPR [id number]  Kaycee Harvey APRN Inpatient     Advance Care Planning Activity      Questions for Current Code Status     Question Answer Comment    Code Status No CPR     Medical Interventions (Level of Support Prior to Arrest) Limited     Limited Support to NOT Include Intubation      Cardioversion/Defibrillation      Dialysis     Comments Discussed with      Level Of Support Discussed With Next of Kin (If No Surrogate)         Current Facility-Administered Medications   Medication Dose Route Frequency Provider Last Rate Last Admin   • insulin lispro (humaLOG, ADMELOG) injection 0-7 Units  0-7 Units Subcutaneous Q6H Kaycee Harvey APRN   4 Units at 01/13/21 0617   • labetalol (NORMODYNE,TRANDATE) injection 20 mg  20 mg Intravenous Q4H PRN Jose Miguel Shay APRN   20 mg at 01/13/21 0824   • lactated ringers infusion  75 mL/hr Intravenous Continuous Noé Ugarte MD 75 mL/hr at 01/13/21 0992  "75 mL/hr at 01/13/21 0928   • niCARdipine (CARDENE) 20 mg in 200 mL NS infusion  5-15 mg/hr Intravenous Titrated Lilly Lees APRN 125 mL/hr at 01/13/21 0228 12.5 mg/hr at 01/13/21 0228   • potassium chloride (MICRO-K) CR capsule 40 mEq  40 mEq Oral PRN Wes, Kaycee W, APRN        Or   • potassium chloride (KLOR-CON) packet 40 mEq  40 mEq Oral PRN Wells, Kaycee W, APRN        Or   • potassium chloride 10 mEq in 100 mL IVPB  10 mEq Intravenous Q1H PRN Wells, Kaycee W, APRN 100 mL/hr at 01/12/21 2259 10 mEq at 01/12/21 2259   • sodium chloride 0.9 % flush 10 mL  10 mL Intravenous Q12H Wes, Kaycee W, APRN   10 mL at 01/13/21 0831   • sodium chloride 0.9 % flush 10 mL  10 mL Intravenous PRN Wes, Kaycee W, APRN         lactated ringers, 75 mL/hr, Last Rate: 75 mL/hr (01/13/21 0928)  niCARdipine, 5-15 mg/hr, Last Rate: 12.5 mg/hr (01/13/21 0228)      labetalol  •  potassium chloride **OR** potassium chloride **OR** potassium chloride  •  sodium chloride  Allergies   Allergen Reactions   • Thorazine [Chlorpromazine] Other (See Comments)     \"has opposite effect\"      Family History   Problem Relation Age of Onset   • Heart disease Mother    • Hypertension Mother    • Osteoarthritis Father      Social History     Socioeconomic History   • Marital status:      Spouse name: Not on file   • Number of children: Not on file   • Years of education: Not on file   • Highest education level: Not on file   Tobacco Use   • Smoking status: Never Smoker   • Smokeless tobacco: Never Used   Substance and Sexual Activity   • Alcohol use: No   • Drug use: No   • Sexual activity: Not Currently     Partners: Female     Birth control/protection: None     Review of Systems -all others reviewed and found negative      /61   Pulse 93   Temp 97.5 °F (36.4 °C) (Bladder)   Resp 16   Ht 160 cm (62.99\")   Wt 48.7 kg (107 lb 5.8 oz)   SpO2 98%   BMI 19.02 kg/m²     Intake/Output Summary (Last 24 hours) at 1/13/2021 " 1145  Last data filed at 1/13/2021 1000  Gross per 24 hour   Intake 1688.62 ml   Output 227 ml   Net 1461.62 ml     Physical Exam:      General Appearance:    Unresponsive   Head:    Normocephalic, without obvious abnormality, atraumatic   Eyes:            Lids and lashes normal, conjunctivae and sclerae normal, no   icterus, no pallor, corneas clear, PERRLA   Ears:    Ears appear intact with no abnormalities noted   Throat:   No oral lesions, no thrush, oral mucosa moist   Neck:   No adenopathy, supple, trachea midline, no thyromegaly, no     carotid bruit, no JVD   Back:     No kyphosis present, no scoliosis present, no skin lesions,       erythema or scars, no tenderness to percussion or                   palpation,   range of motion normal   Lungs:     Clear to auscultation,respirations regular, even and                   unlabored    Heart:    Regular rhythm and normal rate, normal S1 and S2, no            murmur, no gallop, no rub, no click   Breast Exam:    Deferred   Abdomen:     Normal bowel sounds, no masses, no organomegaly, soft        non-tender, non-distended, no guarding, no rebound                 tenderness   Genitalia:    Deferred   Extremities:   No edema   Pulses:   Pulses palpable and equal bilaterally   Skin:   No bleeding, bruising or rash   Lymph nodes:   No palpable adenopathy           Results from last 7 days   Lab Units 01/12/21  1520   WBC 10*3/mm3 13.73*   HEMOGLOBIN g/dL 13.5   HEMATOCRIT % 43.9   PLATELETS 10*3/mm3 450     Results from last 7 days   Lab Units 01/13/21  0531 01/12/21  1520   SODIUM mmol/L  --  141   POTASSIUM mmol/L 3.8 3.0*   CHLORIDE mmol/L  --  101   CO2 mmol/L  --  25.0   BUN mg/dL  --  35*   CREATININE mg/dL  --  0.66   CALCIUM mg/dL  --  10.1   BILIRUBIN mg/dL  --  0.5   ALK PHOS U/L  --  192*   ALT (SGPT) U/L  --  91*   AST (SGOT) U/L  --  46*   GLUCOSE mg/dL  --  216*     Results from last 7 days   Lab Units 01/13/21  0531 01/12/21  1520   SODIUM mmol/L  --   141   POTASSIUM mmol/L 3.8 3.0*   CHLORIDE mmol/L  --  101   CO2 mmol/L  --  25.0   BUN mg/dL  --  35*   CREATININE mg/dL  --  0.66   GLUCOSE mg/dL  --  216*   CALCIUM mg/dL  --  10.1     Imaging Results (Last 72 Hours)     Procedure Component Value Units Date/Time    XR Chest 1 View [[id number]] Collected: 01/12/21 1629     Updated: 01/12/21 2034    Narrative:      EXAMINATION: XR CHEST 1 VW- 01/12/2021     INDICATION: CVA; Z00.6-Encounter for examination for normal comparison  and control in clinical research program     COMPARISON: NONE     FINDINGS: Heart and mediastinum appear within normal limits. Pulmonary  vasculature appears cephalized. There is mild prominence of the  pulmonary interstitial markings, but minimal if any interstitial edema.  No focal lung consolidation, effusion or pneumothorax is seen.       Impression:      1. Normal sized heart shadow with mild pulmonary venous hypertension.  2. Mild nonspecific pulmonary interstitial changes, possibly chronic. No  separate focal lung disease is seen.      D:  01/12/2021  E:  01/12/2021     This report was finalized on 1/12/2021 8:31 PM by Dr. Rex Krause MD.       CT Head Without Contrast [[id number]] Collected: 01/12/21 1543     Updated: 01/12/21 1710    Narrative:      EXAMINATION: CT HEAD WO CONTRAST-01/12/2021:      INDICATION: Stroke, follow-up.      TECHNIQUE: CT head without intravenous contrast.     The radiation dose reduction device was turned on for each scan per the  ALARA (As Low as Reasonably Achievable) protocol.     COMPARISON: None available at the time of dictation.     FINDINGS: Intraparenchymal hemorrhage left basal ganglia measuring 3.6 x  2.7 x 3.2 cm with adjacent vasogenic edema and mass effect of minimal  effacement left lateral ventricle without midline shift. No  intraventricular hemorrhage extension. Globes and orbits are  unremarkable. The paranasal sinuses and mastoid air cells are grossly  clear and well pneumatized.     ICH  Volume is >30 ml: No; 15 mL estimated.     Intraventricular Hemorrhage: No.     Infratentorial Origin of Hemorrhage: No.       Impression:      Intraparenchymal hemorrhage centered within the left basal  ganglia with surrounding vasogenic edema and mild effacement of left  lateral ventricle without midline shift or intraventricular hemorrhage  extension.     ICH Volume is >30 ml: No; 15 mL estimated.     Intraventricular Hemorrhage: No.     Infratentorial Origin of Hemorrhage: No.     D:  01/12/2021  E:  01/12/2021           This report was finalized on 1/12/2021 5:07 PM by Dr. Robi Tony.       XR Outside Films [[id number]] Resulted: 01/12/21 1542     Updated: 01/12/21 1542    Narrative:      This procedure was auto-finalized with no dictation required.    CT Outside Films [[id number]] Resulted: 01/12/21 1541     Updated: 01/12/21 1541    Narrative:      This procedure was auto-finalized with no dictation required.        Impression: ICH  Change in MS  [facility]      Plan: Tried calling the  multiple times but no answer.  We will continue to try to reach him.  We will continue patient on current symptomatic regimen.        Danny Simon,   01/13/21  11:45 EST

## 2021-01-13 NOTE — PROGRESS NOTES
Did talk to the patient's .  He understand the patient is actively dying.  Is okay with talking to hospice in a comfort plan of care.      Danny Simon DO  11:56 EST  1/13/21

## 2021-01-13 NOTE — CONSULTS
Chart review for diabetes educator consult.    At the time of this review patient A1c is 6.5 , currently a resident of a SNF.. At this time we do not feel the patient would benefit from diabetes education. Thank you for this consult, should patient needs change please re consult us.

## 2021-01-13 NOTE — PLAN OF CARE
Problem: Adult Inpatient Plan of Care  Goal: Plan of Care Review  Outcome: Ongoing, Progressing  Flowsheets (Taken 1/13/2021 1515)  Progress: no change  Plan of Care Reviewed With: patient   Goal Outcome Evaluation:  Plan of Care Reviewed With: patient  Progress: no change  Pt has transitioned into comfort measures. Palliative team and hospice have met w/ Pt and Pt's family. Pt's  visited at bedside. Will continue to monitor.

## 2021-01-13 NOTE — PLAN OF CARE
Goal Outcome Evaluation:  Plan of Care Reviewed With: patient  Progress: no change  Outcome Summary: NIH 30. Labetalol ordered PRN and given, Cardene gtt able to be turned off. Minimal UOP. Pt vomited once at 0545. VSS.

## 2021-01-13 NOTE — PROGRESS NOTES
Discharge Planning Assessment  [facility]     Patient Name: Mary Enrique  MRN: [id number]  Today's Date: 2021    Admit Date: 2021    Discharge Needs Assessment    No documentation.       Discharge Plan     Row Name 21 1157       Plan    Plan  initial    Patient/Family in Agreement with Plan  unable to assess    Plan Comments  Plan of  discussed in MDR.  Patient transferred here from [facility] for stroke.  Mrs. Enrique is a resident of [facility].  CM contacted facility and they verified she is a resident there.  Patient is bedbound, blind with dememtia and contractures. Plan now now focused on comfort care and possible hospice.  CM following.    Final Discharge Disposition Code  30 - still a patient        Continued Care and Services - Admitted Since 2021     Destination     Service Provider Request Status Selected Services Address Phone Fax Patient Preferred    [facility]  Pending - No Request Sent N/A [address] [address] [phone] --              Expected Discharge Date and Time     Expected Discharge Date Expected Discharge Time    Mariano 15, 2021         Demographic Summary     Row Name 21 1154       General Information    Admission Type  inpatient    Reason for Consult  discharge planning    General Information Comments  PCP - Felecia Weber        Functional Status     Row Name 21 1155       Functional Status    Usual Activity Tolerance  poor    Current Activity Tolerance  poor    Functional Status Comments  Patient is bedbound with contractures.  Is also blind       Functional Status, IADL    Medications  completely dependent    Meal Preparation  completely dependent    Housekeeping  completely dependent    Laundry  completely dependent    Shopping  completely dependent       Mental Status Summary    Recent Changes in Mental Status/Cognitive Functioning  unable to assess         Psychosocial    No documentation.       Abuse/Neglect    No documentation.       Legal    No documentation.       Substance Abuse    No documentation.       Patient Forms    No documentation.           Lela Dhillon RN

## 2021-01-13 NOTE — DISCHARGE PLACEMENT REQUEST
"Nicol Rodríguez (75 y.o. Female)     Date of Birth Social Security Number Address Home Phone MRN    1945  [id number]  [place] [address] [phone] [id number]    [employer] Marital Status          None        Admission Date Admission Type Admitting Provider Attending Provider Department, Room/Bed    1/12/21 Elective Noé Ugarte MD Mueller, Joseph C, MD [facility] 2B ICU, N235/1    Discharge Date Discharge Disposition Discharge Destination                       Attending Provider: Noé Ugarte MD    Allergies: Thorazine [Chlorpromazine]    Isolation: None   Infection: None   Code Status: No CPR    Ht: 160 cm (62.99\")   Wt: 48.7 kg (107 lb 5.8 oz)    Admission Cmt: None   Principal Problem: L basal ganglia ICH  [I61.9]                 Active Insurance as of 1/12/2021     Primary Coverage     Payor Plan Insurance Group Employer/Plan Group    [facility] MEDICARE REPLACEMENT [facility] MEDICARE REPLACEMENT [id number]     Payor Plan Address Payor Plan Phone Number Payor Plan Fax Number Effective Dates    [address] [address]   1/1/2016 - None Entered    [facility] [address]       Subscriber Name Subscriber Birth Date Member ID       NICOL RODRÍGUEZ 1945 [id number]                 Emergency Contacts      (Rel.) Home Phone Work Phone Mobile Phone    Williams Rodríguez (Spouse) [phone] -- [phone]    Lila Tejeda (Daughter) [phone] -- [phone]            Emergency Contact Information     Name Relation Home Work Mobile    Williams Rodríguez Spouse [phone]    Lila Tejeda Daughter [phone]          Insurance Information                [employer] MEDICARE REPLACEMENT/[employer] MEDICARE REPLACEMENT Phone:     Subscriber: Pamela Rodríguezn ELIDA Subscriber#: [id number]    Group#: [id number] Precert#: [id number]             History & Physical      Noé Ugarte MD at 01/12/21 1406  "         Pulmonary/Critical Care History and Physical Exam     LOS: 0 days   Patient Care Team:  Gus Izquierdo MD as PCP - General    Chief Complaint: Strokelike symptoms    Subjective     HPI:   Mary Enrique is a 75 y.o. female being transferred to [facility] from [facility] on 1/12 for management of left basal ganglia hemorrhage.     The patient is a nursing home resident with history of CVA who is bed bound, legally blind, demented, with lower extremities contractures. She presented to OSH ED for evaluation of new onset aphasia and right upper extremity paralysis noted at 1030 AM. The nursing aid reportedly noticed increased confusion last evening as well. NIH was 24 and CT imaging that displayed a left basal ganglia hemorrhage without midline shift. Labs were reportedly benign and she was placed on Cardene infusion for hypertension. Case was discussed with Dr. Canela who did not feel there was any role for surgical intervention. She will be admitted to the ICU for higher level of care.     Patient is unable to relay any history.    Of note she is a FULL CODE.     COVID-19 testing at OSH pending.     History taken from: patient/chart     Past Medical History:   Diagnosis Date   • Raheem Bonnet syndrome 2002    Pt. fell out of bed, hit head, and was diagnosed with concussion. Was later diagnosed with Raheem Bonnet Syndrome by Dr. Patel here in [place]   • Diabetes (CMS/[facility])    • History of transfusion     1967, 3 units   • Hypercholesteremia    • Hypertension    • Osteoarthritis        Past Surgical History:   Procedure Laterality Date   • APPENDECTOMY     • BACK SURGERY  2006    DR ORTA   • BACK SURGERY  1996    DR ORTA   • CARPAL TUNNEL RELEASE     • CATARACT EXTRACTION     • CHOLECYSTECTOMY     • HYSTERECTOMY         Family History   Problem Relation Age of Onset   • Heart disease Mother    • Hypertension Mother    • Osteoarthritis Father        Social History     Socioeconomic History   •  "Marital status:      Spouse name: Not on file   • Number of children: Not on file   • Years of education: Not on file   • Highest education level: Not on file   Tobacco Use   • Smoking status: Never Smoker   • Smokeless tobacco: Never Used   Substance and Sexual Activity   • Alcohol use: No   • Drug use: No   • Sexual activity: Not Currently     Partners: Female     Birth control/protection: None       Allergies   Allergen Reactions   • Thorazine [Chlorpromazine] Other (See Comments)     \"has opposite effect\"        PMH/FH/SocH were reviewed by me and updates were made.     Review of Systems:    Review of 14 systems was completed with positives and pertinent negatives noted in the subjective section.  All other systems reviewed and are negative.   Exceptions are noted below:    Unable to obtain secondary to speech difficulty.    Objective     Vital Signs  BP: ()/()   Arterial Line BP: ()/()   There is no height or weight on file to calculate BMI.       Physical Exam:     General Appearance:   Obtunded, chronically ill-appearing, contracted, in no acute distress   Head:    Normocephalic, without obvious abnormality, atraumatic   Eyes:            Lids and lashes normal, eyes closed but opens them slightly to voice.  Conjunctivae and sclerae normal, no   icterus, no pallor, corneas clear, PERRL, left eye deviated outward.    ENMT:   Ears appear intact with no abnormalities noted      No oral lesions, no thrush, oral mucosa dry   Neck:   No adenopathy, supple, trachea midline, no thyromegaly, no   carotid bruit, no JVD       Lungs/resp:     Normal effort, symmetric chest rise, no crepitus, clear to      auscultation bilaterally, no chest wall tenderness                  Heart/CV:    Regular rhythm and normal rate, normal S1 and S2, no            murmur   Abdomen/GI:     Normal bowel sounds, no masses, no organomegaly, soft        non-tender, non-distended   G/U:     Deferred   Extremities/MSK:   No clubbing, " cyanosis or edema.  Contracted lower extremities.    Pulses:   Pulses palpable and equal bilaterally   Skin:   No bleeding, bruising or rash   Hem/Lymph:   No cervical or supraclavicular adenopathy.    Neurologic:   Contracted extremities.  Decerebrate posturing on right.  Obtunded.            Psychiatric:  Non-agitated.   Findings are documentation my personal physical examination.  Electronically signed by:Noé Ugarte MD 01/12/21 17:45 EST    Interval: baseline  1a. Level of Consciousness: 2-->Not alert, requires repeated stimulation to attend, or is obtunded and requires strong or painful stimulation to make movements (not stereotyped)  1b. LOC Questions: 2-->Answers neither question correctly  1c. LOC Commands: 2-->Performs neither task correctly  2. Best Gaze: 2-->Forced deviation, or total gaze paresis not overcome by the oculocephalic maneuver  3. Visual: 3-->Bilateral hemianopia (blind including cortical blindness)  4. Facial Palsy: 2-->Partial paralysis (total or near-total paralysis of lower face)  5a. Motor Arm, Left: 1-->Drift, limb holds 90 (or 45) degrees, but drifts down before full 10 seconds, does not hit bed or other support  5b. Motor Arm, Right: 3-->No effort against gravity, limb falls  6a. Motor Leg, Left: 3-->No effort against gravity, leg falls to bed immediately  6b. Motor Leg, Right: 3-->No effort against gravity, leg falls to bed immediately  7. Limb Ataxia: 0-->Absent  8. Sensory: 1-->Mild-to-moderate sensory loss, patient feels pinprick is less sharp or is dull on the affected side, or there is a loss of superficial pain with pinprick, but patient is aware of being touched  9. Best Language: 3-->Mute, global aphasia, no usable speech or auditory comprehension  10. Dysarthria: 2-->Severe dysarthria, patients speech is so slurred as to be unintelligible in the absence of or out of proportion to any dysphasia, or is mute/anarthric  11. Extinction and Inattention (formerly Neglect):  1-->Visual, tactile, auditory, spatial, or personal inattention or extinction to bilateral simultaneous stimulation in one of the sensory modalities    Total (NIH Stroke Scale): 30   Results Review:     I reviewed the patient's new clinical results.         Invalid input(s): LABALBUKYREE        Invalid input(s): NEUTOPHILPCT,  EOSPCT        I reviewed the patient's new imaging including images and reports.    Medication Review:     No current facility-administered medications for this encounter.       Assessment/Plan     Active Hospital Problems    Diagnosis   • **L basal ganglia ICH    • T2DM    • Hypertension   • Chronic UTI (MDR E. Coli)    • Dyslipidemia   • Dementia    • LE contractures    • Bedbound   • Nursing home resident   • H/O CVA    • Chronic pain   • Legally blind   • Raheem Bonnet syndrome     75 y.o. chronically debilitated female with mild dementia is a nursing home resident is transferred for left basal ganglia ICH.  She is currently obtunded and unable to relay any history.  By report she is verbal at baseline.  She was evaluated by neurosurgery who did not feel she is a candidate for any surgical intervention.    We need to determine goals of care for this patient with extremely poor prognosis of meaningful recovery.  We tried multiple times and have been unable to reach her .  She will therefore remain full code.  Per report, he has stated she is to be full code, full support.  I will consult palliative care for additional discussion given her poor prognosis and likelihood of extremely poor quality of life going forward.  It seems that hospice would be a reasonable option going forward.    Plan:    1. Admit to ICU   2. Hemorrhagic stroke order set  3. Neurosurgery and neurology consults  4. Nicardipine as needed  5. COVID-19 testing   6. Blood culture X2 and UA- will defer ABX pending labs   7. Correction insulin   8. Mechanical VTE PPX   9. Pepcid PUD PPX    Kaycee Harvey, APRN,  AGACNP-BC, FNP-BC   Pulmonary and Critical Care   I performed an independent history and physical examination. Portions of the history were obtained by APRN and were modified by me according to my findings. The above note reflects my findings, assessment, and plan.    Noé Ugarte MD    The patient is critically ill secondary to ICH with tenuous respiratory and neurologic status and has high likelihood of life-threatening decline in condition which could include respiratory failure, brain herniation, or death.  As such, the patient requires continuous monitoring and frequent reassessment for consideration of adjustment in management to minimize this risk.  I personally reassessed her multiple times today.    Critical care time : 37 minutes.(This excludes time spent performing separately reportable procedures and services). including high complexity decision making to assess, manipulate, and support vital organ system failure in this individual who has impairment of one or more vital organ systems such that there is a high probability of imminent or life threatening deterioration in the patient’s condition.    Electronically signed by:  Noé Ugarte MD  01/12/21  17:56 EST        Electronically signed by Noé Ugarte MD at 01/12/21 4471

## 2021-01-13 NOTE — SIGNIFICANT NOTE
01/13/21 1021   SLP Deferred Reason   SLP Deferred Reason Routine  (D/w RN who reports pt is not appropriate this am. Will defer today. Palliative scheduled to meet w/ pt's family for further GOC discission. Will f/u tomorrow pending GOC discussion.)

## 2021-01-13 NOTE — PROGRESS NOTES
Clinical Nutrition Note      Patient Name: Mary Enrique  MRN: [id number]  Admission date: 1/12/2021      Multidisciplinary Rounds    Additional information obtained during MDR:  RN reports pt adm w/ basal ganglia ICH and NIH SS of 30 this remains unchanged; Palliative service has contacted  and hospice.    Current diet: NPO Diet    Oral Nutrition Supplement:    Pertinent medical data reviewed:  No nutrition risk identified on nursing screen; MST score= completion pending    Intervention:  Plan of care and goals reviewed : Hospice per Dr. Simon pt is actively dying    Monitor:  RD will sign off      Ariadne Sosa MS,RD,LD  01/13/21 12:46 EST  Time: 15  mins

## 2021-01-13 NOTE — PROGRESS NOTES
Continued Stay Note  [facility]     Patient Name: Mary Enrique  MRN: [id number]  Today's Date: 1/13/2021    Admit Date: 1/12/2021    Discharge Plan     Row Name 01/13/21 1545       Plan    Plan  Meeting 1/14 at 10am    Plan Comments Family not present. SW made two phone calls to both  and daughter. No answer. Inpatient hospice team can meet with family tomorrow at 10am if they are agreeable.    If inpatient hospice team can be of assistance, please call [phone].        Discharge Codes    No documentation.       Expected Discharge Date and Time     Expected Discharge Date Expected Discharge Time    Mariano 15, 2021             ZENAIDA Kelley

## 2021-01-13 NOTE — PLAN OF CARE
Goal Outcome Evaluation:  Plan of Care Reviewed With: patient  Progress: no change  Outcome Summary: VSS. Pt unresponsive to verbalization. She seems unaware that anyone is in the room. Family is scheduled to come in today to see pt and discuss hospice care. Pt comfort measures at this time. Palliative will continue to follow for support, symptom mgmt and GOC discussions.    1300 Palliative Team Conference: SHAGUFTA Simon DO; EBER Thomas RN, CHPN; SANDEE Goncalves RN, CHPN; K.; LAW Mora, APRN; ARNOL Rivera, RN, CHPN, LOBITO Lopez, [facility]      Problem: Palliative Care  Goal: Enhanced Quality of Life  Outcome: Ongoing, Progressing  Intervention: Maximize Comfort  Flowsheets (Taken 1/13/2021 1351)  Pain Management Interventions: relaxation techniques promoted  Intervention: Optimize Function  Flowsheets (Taken 1/13/2021 1351)  Sleep/Rest Enhancement: awakenings minimized  Intervention: Promote Advance Care Planning  Flowsheets (Taken 1/13/2021 1351)  Life Transition/Adjustment: palliative care discussed  Intervention: Optimize Psychosocial Wellbeing  Flowsheets (Taken 1/13/2021 1351)  Supportive Measures:   positive reinforcement provided   active listening utilized

## 2021-01-13 NOTE — PROGRESS NOTES
Discharge Planning Assessment  [facility]     Patient Name: Mary Enrique  MRN: [id number]  Today's Date: 2021    Admit Date: 2021    Discharge Needs Assessment     Row Name 21 1204       Living Environment    Lives With  facility resident    Current Living Arrangements  extended care facility    Primary Care Provided by  -- facility personnel    Family Caregiver if Needed  spouse       Transition Planning    Patient/Family Anticipates Transition to  long-term care facility    Transportation Anticipated  health plan transportation        Discharge Plan     Row Name 21 1157       Plan    Plan  initial    Patient/Family in Agreement with Plan  unable to assess    Plan Comments  Plan of  discussed in MDR.  Patient transferred here from [facility] for stroke.  Mrs. Enrique is a resident of [facility].  CM contacted facility and they verified she is a resident there.  Patient is bedbound, blind with dememtia and contractures. Plan now now focused on comfort care and possible hospice.  CM following.    Final Discharge Disposition Code  30 - still a patient        Continued Care and Services - Admitted Since 2021     Destination     Service Provider Request Status Selected Services Address Phone Fax Patient Preferred    [facility]  Pending - No Request Sent N/A [address] [facility] [address] [phone] --              Expected Discharge Date and Time     Expected Discharge Date Expected Discharge Time    Mariano 15, 2021         Demographic Summary     Row Name 21 1154       General Information    Admission Type  inpatient    Reason for Consult  discharge planning    General Information Comments  PCP - Felecia Weber        Functional Status     Row Name 21 1204       Functional Status    Usual Activity Tolerance  poor    Current Activity Tolerance  poor    Row Name 21 1155       Functional Status     Usual Activity Tolerance  poor    Current Activity Tolerance  poor    Functional Status Comments  Patient is bedbound with contractures.  Is also blind       Functional Status, IADL    Medications  completely dependent    Meal Preparation  completely dependent    Housekeeping  completely dependent    Laundry  completely dependent    Shopping  completely dependent       Mental Status Summary    Recent Changes in Mental Status/Cognitive Functioning  unable to assess        Psychosocial    No documentation.       Abuse/Neglect    No documentation.       Legal    No documentation.       Substance Abuse    No documentation.       Patient Forms    No documentation.           Lela Dhillon RN

## 2021-01-14 NOTE — H&P
Hospice History and Physical     Patient Name:  Mary Enrique   : 1945   Sex: female    Patient Care Team:  Tra Alicea MD as PCP - General (Internal Medicine)    Code Status: Comfort Measures    Subjective     Patient is a 75 y.o. female admitted to the ICU as transfer from [facility] on 2021 for mgmt of Left Basal Ganglia hemorrhage.     PMHx: CVA, HTN, legally blind, dementia (nos), contracted BLE, bedbound    Pt is a resident of long term care facility and presented to OSH for RUE paralysis and new onset aphasia. NIH 24. CT head revealed a left basal ganglia hemorrhage without midline shift.     Pt was evaluated by Neurosurgery was not a surgical candidate. Palliative Care was consulted upon admission given patient's poor prognosis. Pt with continued decline: restless/agitated; tachypneic, audible congestion - not comfortable.     Ultimately family elected for Comfort Measures and Mrs. Enrique was admitted to inpt Hospice on 2021 for mgmt of acute symptoms 2/2 Left basal ganglia ICH.     Prns in past 24h:  Lasix 40mg x1  Robinul 0.4mg x2  Ativan 0.5mg x4  Morphine 2mg x8    - morphine pca initiated prior to inpt Hospice - titrating to current dose of 5mg/hr    Review of Systems  Review of Systems   Unable to perform ROS: Acuity of condition       History  Past Medical History:   Diagnosis Date   • Raheem Bonnet syndrome     Pt. fell out of bed, hit head, and was diagnosed with concussion. Was later diagnosed with Raheem Bonnet Syndrome by Dr. Patel here in [place]   • Diabetes (CMS/HCC)    • History of transfusion     1967, 3 units   • Hypercholesteremia    • Hypertension    • Osteoarthritis      Past Surgical History:   Procedure Laterality Date   • APPENDECTOMY     • BACK SURGERY      DR ORTA   • BACK SURGERY      DR ORTA   • CARPAL TUNNEL RELEASE     • CATARACT EXTRACTION     • CHOLECYSTECTOMY     • HYSTERECTOMY       Current Facility-Administered  Medications   Medication Dose Route Frequency Provider Last Rate Last Admin   • [START ON 1/15/2021] acetaminophen (TYLENOL) suppository 650 mg  650 mg Rectal Q4H PRN Karon Hansen, APRN       • bisacodyl (DULCOLAX) suppository 10 mg  10 mg Rectal Daily PRN Karon Hansen, APRN       • furosemide (LASIX) injection 20 mg  20 mg Intravenous Q6H Karon Hansen, APRN       • furosemide (LASIX) injection 20 mg  20 mg Intravenous Q6H PRN Karon Hansen, APRN       • glycopyrrolate (ROBINUL) injection 0.2 mg  0.2 mg Intravenous Q6H PRN Karon Hansen, APRN       • haloperidol lactate (HALDOL) injection 1 mg  1 mg Intravenous Q4H PRN Karon Hansen, APRN       • HYDROmorphone (DILAUDID) injection 1 mg  1 mg Intravenous Q1H PRN Karon Hansen, APRN       • HYDROmorphone (DILAUDID) PCA 1 mg/mL syringe   Intravenous Continuous Karon Hansen, APRN       • ketorolac (TORADOL) injection 15 mg  15 mg Intravenous Q6H PRN Karon Hansen, APRN       • LORazepam (ATIVAN) injection 0.5 mg  0.5 mg Intravenous Q8H Karon Hansen, APRN       • LORazepam (ATIVAN) injection 1 mg  1 mg Intravenous Q4H PRN Karon Hansen, APRN       • palliative care oral rinse   Mouth/Throat PRN Karon Hansen, NELLA       • palliative care oral rinse   Mouth/Throat Q6H Karon Hansen, APRN       • polyvinyl alcohol (LIQUIFILM) 1.4 % ophthalmic solution 2 drop  2 drop Both Eyes BID Karon Hansen, APRN       • polyvinyl alcohol (LIQUIFILM) 1.4 % ophthalmic solution 2 drop  2 drop Both Eyes Q1H PRN Karon Hansen, APRN       • Scopolamine (TRANSDERM-SCOP) 1.5 MG/3DAYS patch 1 patch  1 patch Transdermal Q72H Karon Hansen, APRN         HYDROmorphone HCl-NaCl,       •  [START ON 1/15/2021] acetaminophen  •  bisacodyl  •  furosemide  •  glycopyrrolate  •  haloperidol lactate  •  HYDROmorphone  •  ketorolac  •  LORazepam  •  palliative care oral rinse  •  polyvinyl alcohol  Allergies   Allergen  "Reactions   • Thorazine [Chlorpromazine] Other (See Comments)     \"has opposite effect\"      Family History   Problem Relation Age of Onset   • Heart disease Mother    • Hypertension Mother    • Osteoarthritis Father      Social History     Socioeconomic History   • Marital status:      Spouse name: Not on file   • Number of children: Not on file   • Years of education: Not on file   • Highest education level: Not on file   Tobacco Use   • Smoking status: Never Smoker   • Smokeless tobacco: Never Used   Substance and Sexual Activity   • Alcohol use: No   • Drug use: No   • Sexual activity: Not Currently     Partners: Female     Birth control/protection: None       Objective     Vital Signs  Temp:  [97.5 °F (36.4 °C)-100.2 °F (37.9 °C)] 97.5 °F (36.4 °C)  Heart Rate:  [102-124] 124  Resp:  [14-20] 17  BP: (128-171)/(54-91) 171/91    PPS: Palliative Performance Scale score as of 1/15/2021, 15:11 EST is 10% based on the following measures:   Ambulation: Totally bed bound  Activity and Evidence of Disease: Unable to do any work, extensive evidence of disease  Self-Care: Total care  Intake:  Mouth care only  LOC: Drowsy or coma      Physical Exam:  Physical Exam  Constitutional:       Appearance: She is cachectic. She is ill-appearing.      Comments: More comfortable with medication adjustments and prns - can tolerate care upon visit. Audibly congested.    HENT:      Head:      Comments: + temporal wasting  + cheekbones prominent     Mouth/Throat:      Mouth: Mucous membranes are dry.   Eyes:      Comments: closed   Neck:      Comments: thin  Cardiovascular:      Comments: Tachy, HR 110s  Weak radial pulses  DHT  Abdominal:      General: Abdomen is flat.      Palpations: Abdomen is soft.      Comments: Hypoactive BSx4   Musculoskeletal:      Comments: + significant muscle wasting   Skin:     General: Skin is dry.      Coloration: Skin is pale.   Neurological:      Comments: Does not follow commands   Psychiatric: "      Comments: No facial grimacing; no moaning         Results Review:   Lab Results   Component Value Date    HGBA1C 6.50 (H) 01/12/2021       Lab Results   Component Value Date    GLUCOSE 216 (H) 01/12/2021    BUN 35 (H) 01/12/2021    CREATININE 0.66 01/12/2021    EGFRIFNONA 87 01/12/2021    BCR 53.0 (H) 01/12/2021    K 3.8 01/13/2021    CO2 25.0 01/12/2021    CALCIUM 10.1 01/12/2021    ALBUMIN 4.30 01/12/2021    AST 46 (H) 01/12/2021    ALT 91 (H) 01/12/2021       WBC   Date Value Ref Range Status   01/12/2021 13.73 (H) 3.40 - 10.80 10*3/mm3 Final   01/12/2021 7.5 4.0 - 11.0 K/uL Final     RBC   Date Value Ref Range Status   01/12/2021 5.13 3.77 - 5.28 10*6/mm3 Final   01/12/2021 4.87 3.80 - 5.80 M/uL Final     Hemoglobin   Date Value Ref Range Status   01/12/2021 13.5 12.0 - 15.9 g/dL Final   01/12/2021 13.1 11.5 - 16.5 g/dL Final     Hematocrit   Date Value Ref Range Status   01/12/2021 43.9 34.0 - 46.6 % Final   01/12/2021 41.4 37.0 - 47.0 % Final     MCV   Date Value Ref Range Status   01/12/2021 85.6 79.0 - 97.0 fL Final   01/12/2021 85.0 80.0 - 100.0 fL Final     MCH   Date Value Ref Range Status   01/12/2021 26.3 (L) 26.6 - 33.0 pg Final   01/12/2021 26.9 (L) 27.0 - 32.0 pg Final     MCHC   Date Value Ref Range Status   01/12/2021 30.8 (L) 31.5 - 35.7 g/dL Final   01/12/2021 31.6 31.0 - 35.0 g/dL Final     RDW   Date Value Ref Range Status   01/12/2021 15.1 12.3 - 15.4 % Final   01/12/2021 15.0 11.0 - 16.0 % Final     RDW-SD   Date Value Ref Range Status   01/12/2021 47.2 37.0 - 54.0 fl Final     MPV   Date Value Ref Range Status   01/12/2021 10.5 6.0 - 12.0 fL Final   01/12/2021 10.8 (H) 6.0 - 10.0 fL Final     Platelets   Date Value Ref Range Status   01/12/2021 450 140 - 450 10*3/mm3 Final   01/12/2021 330 150 - 400 K/uL Final     Neutrophil Rel %   Date Value Ref Range Status   01/12/2021 78.4 % Final     Lymphocyte Rel %   Date Value Ref Range Status   01/12/2021 14.6 % Final     Monocyte Rel %   Date  Value Ref Range Status   01/12/2021 5.7 % Final     Eosinophil Rel %   Date Value Ref Range Status   01/12/2021 0.7 % Final     Basophil Rel %   Date Value Ref Range Status   01/12/2021 0.3 % Final     Immature Grans %   Date Value Ref Range Status   01/12/2021 0.3 0.0 - 5.0 % Final     Neutrophils Absolute   Date Value Ref Range Status   01/12/2021 11.53 (H) 1.70 - 7.00 10*3/mm3 Final   01/12/2021 5.9 2.0 - 7.5 K/uL Final     Lymphocytes Absolute   Date Value Ref Range Status   01/12/2021 1.1 (L) 1.5 - 4.0 K/uL Final     Monocytes Absolute   Date Value Ref Range Status   01/12/2021 0.4 0.2 - 0.8 K/uL Final     Eosinophils Absolute   Date Value Ref Range Status   01/12/2021 0.00 0.00 - 0.40 10*3/mm3 Final   01/12/2021 0.1 0.0 - 0.4 K/uL Final     Basophils Absolute   Date Value Ref Range Status   01/12/2021 0.00 0.00 - 0.20 10*3/mm3 Final   01/12/2021 0.0 0.0 - 0.1 K/uL Final     Immature Grans, Absolute   Date Value Ref Range Status   01/12/2021 0.0 K/uL Final         L basal ganglia ICH       Assessment/Plan   Assessment/Plan:     75yoF admitted in Hospice 1/14 for Left Basal Ganglia ICH.     Agitation/Restlessness  Anxiety  Dyspnea  Congestion  Constipation  End of Life Care    Schedule Lasix 20mg q6h    Schedule ativan 0.5mg q8h    Scop patch    Prns for comfort    Scheduled and prn palliative mouth rinse    Scheduled and prn eye gtts    Discontinue morphine pca 5mg/hr -- initiate dilaudid pca 0.5mg/hr.   *decreased dosed given opioid rotation    Coordinated care with Nursing and Hospice IDT.    Total Visit Time: 55min  Face to Face Time: 10min    Justification for care:  Patient meets criteria for acute in-patient care with required nursing assessment and interventions for symptoms with IV medications.      Karon Hansen, DNP, MHA, APRN  [facility]  Hospice and Palliative Care Nurse Practitioner  01/14/21  16:40 EST

## 2021-01-14 NOTE — PLAN OF CARE
Goal Outcome Evaluation:  Plan of Care Reviewed With: patient  Progress: no change  Outcome Summary: Makes no verbalizations.  PCA with morphine CIVI.  [place].  NPO.  Patient is blind. Patient has movement of her left arm.  Lower extremities are contracted.  On [employer] mattres.  Plans are for hospice to meet with family this am.  Note left for hospice to be contacted as the  cannot come until after he has his dialysis.

## 2021-01-14 NOTE — PROGRESS NOTES
Visit at pt bedside, actively dying, no family present at time of visit.  Plan is comfort measures.  Possible inpatient Hospice admission dependent on availability to enroll and rapidity of decline.

## 2021-01-14 NOTE — PLAN OF CARE
Goal Outcome Evaluation:         Pt. Flipped to hospice today.  PCA and FC maintained.  Family at BS.

## 2021-01-14 NOTE — PLAN OF CARE
Goal Outcome Evaluation:  Plan of Care Reviewed With: patient  Progress: declining  Outcome Summary: Pt continues to decline and have comfort plan of care in place. Morphine PCA continues. PRNs used to assist in patient's comfort. Family sts enroute to come to hospital. Hospice willing to meet with family when they arrive. Palliative team will continue to follow for support, symptom mgmt and GOC discussions.    1300 Palliative Team Conference: SHAGUFTA Simon DO; EBER Thomas RN, CHPN; SANDEE Goncalves RN, PN; K.; LAW Mora, APRN; ARNOL Rivera, RN, CHPN, LOBITO Lopez, [facility]      Problem: Palliative Care  Goal: Enhanced Quality of Life  Outcome: Ongoing, Progressing  Intervention: Maximize Comfort  Flowsheets (Taken 1/14/2021 1440)  Pain Management Interventions:   around-the-clock dosing utilized   unnecessary movement minimized  Intervention: Optimize Function  Flowsheets (Taken 1/14/2021 1440)  Sensory Stimulation Regulation: music on  Sleep/Rest Enhancement: noise level reduced  Intervention: Promote Advance Care Planning  Flowsheets (Taken 1/14/2021 1440)  Life Transition/Adjustment: palliative care discussed  Intervention: Optimize Psychosocial Wellbeing  Flowsheets (Taken 1/14/2021 1440)  Supportive Measures: positive reinforcement provided

## 2021-01-14 NOTE — PROGRESS NOTES
Continued Stay Note  [facility]     Patient Name: Mary Enrique  MRN: [id number]  Today's Date: 1/14/2021    Admit Date: 1/14/2021      Patient admitted to in patient hospice today. POC formulated and new orders received.           Gracy Faulkner RN

## 2021-01-14 NOTE — NURSING NOTE
updated as to patient being transferred to floor and asked updated to Hospice meeting tomorrow.  asked if can delay til after 11 AM due to having dialysis himself.

## 2021-01-14 NOTE — PROGRESS NOTES
Palliative Care Progress Note    Date of Admission: 1/12/2021    Subjective: Patient remains unresponsive.  Nursing staff report patient has been having increasing upper airway excretion as well as restlessness.  Current Code Status     Date Active Code Status Order ID Comments User Context       1/13/2021 1157 No CPR [id number]  Danny Simon, DO Inpatient     Advance Care Planning Activity      Questions for Current Code Status     Question Answer Comment    Code Status No CPR     Medical Interventions (Level of Support Prior to Arrest) Comfort Measures         No current facility-administered medications on file prior to encounter.      Current Outpatient Medications on File Prior to Encounter   Medication Sig Dispense Refill   • amitriptyline (ELAVIL) 50 MG tablet Take  by mouth.     • diazepam (VALIUM) 10 MG tablet Take  by mouth.     • fentaNYL (DURAGESIC) 75 MCG/HR patch Place  on the skin.     • FLUoxetine (PROzac) 20 MG capsule Take 40 mg by mouth daily.     • HYDROcodone-acetaminophen (NORCO)  MG per tablet Take 1 tablet by mouth 4 (four) times a day.     • lactulose (CHRONULAC) 10 GM/15ML solution Take 10 g by mouth as needed (Pt. has been taking it every morning and sometimes at night lately).     • lisinopril (PRINIVIL,ZESTRIL) 2.5 MG tablet Take 2.5 mg by mouth daily.     • metFORMIN XR (GLUCOPHAGE-XR) 750 MG 24 hr tablet Take 750 mg by mouth daily with dinner. Pts. Spouse states she takes the ER form.      • MICROLET LANCETS misc      • mirtazapine (REMERON) 15 MG tablet Take 15 mg by mouth every night.     • ondansetron (ZOFRAN) 4 MG tablet Take 4 mg by mouth daily as needed for nausea or vomiting.     • pantoprazole (PROTONIX) 40 MG EC tablet Take 40 mg by mouth daily.     • phenazopyridine (PYRIDIUM) 100 MG tablet Take 1 tablet by mouth 3 (three) times a day with meals.  0   • saccharomyces boulardii (FLORASTOR) 250 MG capsule Take 1 capsule by mouth 2 (two) times a day.     • simvastatin  "(ZOCOR) 40 MG tablet Take 40 mg by mouth daily.       Morphine,       •  glycopyrrolate  •  LORazepam  •  Morphine  •  naloxone  •  potassium chloride **OR** potassium chloride **OR** potassium chloride  •  sodium chloride    Objective: /91   Pulse (!) 124 Comment: told nurse  Temp 97.5 °F (36.4 °C) (Oral)   Resp 17   Ht 160 cm (62.99\")   Wt 48.7 kg (107 lb 5.8 oz)   SpO2 (!) 85% Comment: told nurse  BMI 19.02 kg/m²      Intake/Output Summary (Last 24 hours) at 1/14/2021 1128  Last data filed at 1/14/2021 0900  Gross per 24 hour   Intake 186.8 ml   Output 760 ml   Net -573.2 ml     Physical Exam:      General Appearance:    Restless appearing   Head:    Normocephalic, without obvious abnormality, atraumatic   Eyes:            Lids and lashes normal, conjunctivae and sclerae normal, no   icterus, no pallor, corneas clear, PERRLA   Ears:    Ears appear intact with no abnormalities noted   Throat:   No oral lesions, no thrush, oral mucosa moist   Neck:   No adenopathy, supple, trachea midline, no thyromegaly, no     carotid bruit, no JVD   Back:     No kyphosis present, no scoliosis present, no skin lesions,       erythema or scars, no tenderness to percussion or                   palpation,   range of motion normal   Lungs:     Clear to auscultation,respirations regular, even and                   unlabored    Heart:    Regular rhythm and normal rate, normal S1 and S2, no            murmur, no gallop, no rub, no click   Breast Exam:    Deferred   Abdomen:     Normal bowel sounds, no masses, no organomegaly, soft        non-tender, non-distended, no guarding, no rebound                 tenderness   Genitalia:    Deferred   Extremities:   + edema   Pulses:   Pulses palpable and equal bilaterally   Skin:   No bleeding, bruising or rash   Lymph nodes:   No palpable adenopathy         Results from last 7 days   Lab Units 01/12/21  1520   WBC 10*3/mm3 13.73*   HEMOGLOBIN g/dL 13.5   HEMATOCRIT % 43.9 "   PLATELETS 10*3/mm3 450     Results from last 7 days   Lab Units 01/13/21  0531 01/12/21  1520   SODIUM mmol/L  --  141   POTASSIUM mmol/L 3.8 3.0*   CHLORIDE mmol/L  --  101   CO2 mmol/L  --  25.0   BUN mg/dL  --  35*   CREATININE mg/dL  --  0.66   CALCIUM mg/dL  --  10.1   BILIRUBIN mg/dL  --  0.5   ALK PHOS U/L  --  192*   ALT (SGPT) U/L  --  91*   AST (SGOT) U/L  --  46*   GLUCOSE mg/dL  --  216*       Impression: ICH  Change in MS  C     Plan: I will look at adjusting patient symptom medicine by increasing her morphine infusion.  Continue to rest of the symptomatic regimen.        Danny Simon,   01/14/21  11:28 EST

## 2021-01-14 NOTE — PROGRESS NOTES
[facility] Medicine Services  PROGRESS NOTE    Patient Name: Mary Enrique  : 1945  MRN: [id number]    Date of Admission: 2021  Primary Care Physician: Tra Alicea MD    Subjective   Subjective     CC:  CVA    HPI:  Obtunded, but uncomfortable per nursing    ROS:  Unable to assess    Objective   Objective     Vital Signs:   Temp:  [97.5 °F (36.4 °C)-100.2 °F (37.9 °C)] 97.5 °F (36.4 °C)  Heart Rate:  [] 124  Resp:  [14-20] 17  BP: (122-171)/(54-91) 171/91  Total (NIH Stroke Scale): 30     Physical Exam:  Mild distress, seems uncomfortable, chronically ill appearing  OP dry  Neck supple  Tachy  Mildly labored  +BS, soft  Obtunded    Results Reviewed:  Results from last 7 days   Lab Units 21  1520 21  1230   WBC 10*3/mm3 13.73* 7.5   HEMOGLOBIN g/dL 13.5 13.1   HEMATOCRIT % 43.9 41.4   PLATELETS 10*3/mm3 450 330   INR  1.15 1.06   PROCALCITONIN ng/mL 0.04  --      Results from last 7 days   Lab Units 21  0531 21  1520   SODIUM mmol/L  --  141   POTASSIUM mmol/L 3.8 3.0*   CHLORIDE mmol/L  --  101   CO2 mmol/L  --  25.0   BUN mg/dL  --  35*   CREATININE mg/dL  --  0.66   GLUCOSE mg/dL  --  216*   CALCIUM mg/dL  --  10.1   ALT (SGPT) U/L  --  91*   AST (SGOT) U/L  --  46*   TROPONIN T ng/mL  --  <0.010   PROBNP pg/mL  --  175.9     Estimated Creatinine Clearance: 45.3 mL/min (by C-G formula based on SCr of 0.66 mg/dL).    Microbiology Results Abnormal     Procedure Component Value - Date/Time    Urine Culture - Urine, Urine, Clean Catch [[id number]]  (Abnormal)  (Susceptibility) Collected: 21 1702    Lab Status: Preliminary result Specimen: Urine, Clean Catch Updated: 21 0247     Urine Culture >100,000 CFU/mL Escherichia coli    Susceptibility      Escherichia coli     LIEN     Ampicillin Resistant     Ampicillin + Sulbactam Resistant     Cefazolin Resistant     Cefepime Susceptible     Ceftazidime Susceptible     Ceftriaxone  Resistant     Gentamicin Susceptible     Levofloxacin Resistant     Nitrofurantoin Susceptible     Piperacillin + Tazobactam Resistant     Tetracycline Resistant     Trimethoprim + Sulfamethoxazole Resistant                    Blood Culture - Blood, Arm, Right [[id number]] Collected: 01/12/21 1512    Lab Status: Preliminary result Specimen: Blood from Arm, Right Updated: 01/13/21 1546     Blood Culture No growth at 24 hours    Blood Culture - Blood, Arm, Left [[id number]] Collected: 01/12/21 1520    Lab Status: Preliminary result Specimen: Blood from Arm, Left Updated: 01/13/21 1546     Blood Culture No growth at 24 hours    COVID PRE-OP / PRE-PROCEDURE SCREENING ORDER (NO ISOLATION) - Swab, Nasopharynx [[id number]]  (Normal) Collected: 01/12/21 1644    Lab Status: Final result Specimen: Swab from Nasopharynx Updated: 01/12/21 1746    Narrative:      The following orders were created for panel order COVID PRE-OP / PRE-PROCEDURE SCREENING ORDER (NO ISOLATION) - Swab, Nasopharynx.  Procedure                               Abnormality         Status                     ---------                               -----------         ------                     COVID-19 and FLU A/B PCR...[[id number]]  Normal              Final result                 Please view results for these tests on the individual orders.    COVID-19 and FLU A/B PCR - Swab, Nasopharynx [[id number]]  (Normal) Collected: 01/12/21 1644    Lab Status: Final result Specimen: Swab from Nasopharynx Updated: 01/12/21 1746     COVID19 Not Detected     Influenza A PCR Not Detected     Influenza B PCR Not Detected    Narrative:      Fact sheet for providers: https://www.fda.gov/media/291341/download    Fact sheet for patients: https://www.fda.gov/media/403066/download    Test performed by PCR.          Imaging Results (Last 24 Hours)     ** No results found for the last 24 hours. **               I have reviewed the medications:  Scheduled Meds:Scopolamine, 1 patch,  Transdermal, Q72H  sodium chloride, 10 mL, Intravenous, Q12H      Continuous Infusions:Morphine,       PRN Meds:.•  glycopyrrolate  •  LORazepam  •  LORazepam  •  Morphine  •  naloxone  •  potassium chloride **OR** potassium chloride **OR** potassium chloride  •  sodium chloride    Assessment/Plan   Assessment & Plan     Active Hospital Problems    Diagnosis  POA   • **L basal ganglia ICH  [I61.9]  Yes   • Dementia  [F03.90]  Yes   • LE contractures  [M24.569]  Yes   • Bedbound [Z74.01]  Not Applicable   • Nursing home resident [Z59.3]  Not Applicable   • H/O CVA  [Z86.73]  Not Applicable   • T2DM  [E11.65]  Yes   • Chronic pain [G89.29]  Yes   • Chronic UTI (MDR E. 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        Brief Hospital Course to date:  75 y.o. chronically debilitated female with mild dementia is a nursing home resident is transferred for left basal ganglia ICH. She was evaluated by NSG and not thought to be a surgical candidate.    L basal ganglia ICH  --palliative evaluated and family pursuing hospice  --adjust pain medications, keep comfortble    COVID negative    Possible UTI/POA  --comfort measures only per report    DVT Prophylaxis:  SCDS      Disposition: I expect the patient to be discharged to inpatient hospice    CODE STATUS:   Code Status and Medical Interventions:   Ordered at: 01/13/21 1157     Code Status:    No CPR     Medical Interventions (Level of Support Prior to Arrest):    Comfort Measures       Rex Jackson MD  01/14/21

## 2021-01-14 NOTE — PROGRESS NOTES
Case Management Discharge Note      Final Note: Pt was discharged and admitted into Hospice services.         Selected Continued Care - Discharged on 1/14/2021 Admission date: 1/12/2021 - Discharge disposition: Hospice/Medical Facility ([facility])    Destination    No services have been selected for the patient.              Durable Medical Equipment    No services have been selected for the patient.              Dialysis/Infusion    No services have been selected for the patient.              Home Medical Care    No services have been selected for the patient.              Therapy    No services have been selected for the patient.              Community Resources    No services have been selected for the patient.                       Final Discharge Disposition Code: 51 - hospice medical facility

## 2021-01-15 NOTE — DISCHARGE SUMMARY
[facility] Medicine Services  DISCHARGE TO INPATIENT HOSPICE    Patient Name: Mary Enrique  : 1945  MRN: [id number]    Date of Admission: 2021  Date of Discharge:  2021  Primary Care Physician: Tra Alicea MD    Consults     Date and Time Order Name Status Description    2021 1753 Inpatient Palliative Care MD Consult Completed     2021 1458 Inpatient Neurology Consult Stroke Completed     2021 1458 Inpatient Neurosurgery Consult Completed         Hospital Course     Presenting Problem:   Hemorrhagic cerebrovascular accident (CVA) (CMS/[facility]) [I61.9]  ICH (intracerebral hemorrhage) (CMS/[facility]) [I61.9]    Active Hospital Problems    Diagnosis  POA   • **L basal ganglia ICH  [I61.9]  Yes   • Dementia  [F03.90]  Yes   • LE contractures  [M24.569]  Yes   • Bedbound [Z74.01]  Not Applicable   • Nursing home resident [Z59.3]  Not Applicable   • H/O CVA  [Z86.73]  Not Applicable   • T2DM  [E11.65]  Yes   • Chronic pain [G89.29]  Yes   • Chronic UTI (MDR E. 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          Hospital Course:  75 y.o. chronically debilitated female with mild dementia is a nursing home resident is transferred for left basal ganglia ICH. She was evaluated by NSG and not thought to be a surgical candidate.     L basal ganglia ICH  --Palliative evaluated and family pursuing hospice, and she will be admitted to inpatient hospice today with the focus on comfort care.     COVID negative       Day of Discharge     HPI:   CVA     HPI:  Obtunded, but uncomfortable per nursing     ROS:  Unable to assess        Objective      Objective      Vital Signs:   Temp:  [97.5 °F (36.4 °C)-100.2 °F (37.9 °C)] 97.5 °F (36.4 °C)  Heart Rate:  [] 124  Resp:  [14-20] 17  BP:  (122-171)/(54-91) 171/91  Total (NIH Stroke Scale): 30     Physical Exam:  Mild distress, seems uncomfortable, chronically ill appearing  OP dry  Neck supple  Tachy  Mildly labored  +BS, soft  Obtunded      Discharge Details     Discharge Disposition:  Transfer care to inpatient Hospice at [facility]    Time Spent on Discharge:  40 minutes    Rex Jackson MD  01/15/21

## 2021-01-15 NOTE — DISCHARGE SUMMARY
Date of Death:  1/15/2021  Time of Death:  1355    Presenting Problem/History of Present Illness    L basal ganglia ICH       Hospital Course    Patient is a 75 y.o. female admitted to the ICU as transfer from [facility] on 1/12/2021 for mgmt of Left Basal Ganglia hemorrhage.      PMHx: CVA, HTN, legally blind, dementia (nos), contracted BLE, bedbound     Pt is a resident of long term care facility and presented to OSH for RUE paralysis and new onset aphasia. NIH 24. CT head revealed a left basal ganglia hemorrhage without midline shift.      Pt was evaluated by Neurosurgery was not a surgical candidate. Palliative Care was consulted upon admission given patient's poor prognosis. Pt with continued decline: restless/agitated; tachypneic, audible congestion - not comfortable.      Ultimately family elected for Comfort Measures and Mrs. Enrique was admitted to in Hospice on 1/14/2021 for mgmt of acute symptoms 2/2 Left basal ganglia ICH.       Social History:  Social History     Tobacco Use   • Smoking status: Never Smoker   • Smokeless tobacco: Never Used   Substance Use Topics   • Alcohol use: No         Consults:   Consults     Date and Time Order Name Status Description    1/12/2021 1753 Inpatient Palliative Care MD Consult Completed     1/12/2021 1458 Inpatient Neurology Consult Stroke Completed     1/12/2021 1458 Inpatient Neurosurgery Consult Completed         Exam confirms with auscultation zero audible heart tones and zero audible respirations. Ms.Marilyn ELIDA Enrique was pronounced dead at 1355.  MD notified by Patient's RN.     Raheem Danielle RN  Clinical House Supervisor  1/15/2021 14:15 EST      Karon Hansen, ADAM, MHA, APRN  [facility]  Hospice and Palliative Care Nurse Practitioner  01/15/21  15:46 EST

## 2021-01-15 NOTE — SIGNIFICANT NOTE
Exam confirms with auscultation zero audible heart tones and zero audible respirations. Ms.Marilyn ELIDA Enrique was pronounced dead at 1355.  MD notified by Patient's RN.    Raheem Danielle RN  Clinical House Supervisor  1/15/2021 14:15 EST

## 2021-01-15 NOTE — PROGRESS NOTES
Continued Stay Note  [facility]     Patient Name: Mary Enrique  MRN: [id number]  Today's Date: 1/15/2021    Admit Date: 1/14/2021     PPS 10 % % Mary Enrique is a 75 y.o. female being transferred to [facility] from [facility] on 1/12 for increase confusion and RUE paralysis. Pt suffered a hemorrhage left basal ganglia. Pt is nonresponsive and  and daughter at bedside. Breathe sounds diminished and pt is tachypnea and tachycardia, pulses weak. [place] drained 1400 mae urine, last BM is unknown, BS hypoactive. Pt skin very hot, toradol given by RN. Pts legs are severely contracted. Pt is blind. PCA morphine @ 0.5 mg/hr infusing for pain and dyspnea. PRN Lasix 20 mg x 1, robinul 0.2 x1, robinul 0.4 mg x 1, toradol 15 mg x1, Ativan 0.5 mg x 2, morphine 2 mg x 4/ 24 hr for pain dyspnea, fever and anxiety. Pt is now resting comfortably. Patient meets criteria for acute in-patient care with required nursing assessment and interventions for symptoms with IV medications. Will continue to monitor for any changes.            Gracy Faulkner RN

## 2021-01-15 NOTE — PROGRESS NOTES
Hospice Progress Note    Patient Name: Mary Enrique   : 1945  Gender: female    Code Status: comfort measures    Date of Admission: 2021    Subjective:    75yoF admitted inpt Hospice  for ICH.    Overnight notes reviewed: PCA with CIVI dilaudid.  Has scheduled lasix but has also received IV robinul for increased secretions.  Scop patch employed.   and daughter at bedside.  Has not made any movement with her left arm as she had done before.  Feverish.  Given IV toradol for this. Continues to decline.    Today pt much more comfortable, actively dying. Family at bedside.     - PRNs:  toradol 15mg x1    - Held: none    - Intake/Output    *PO: NPO since hospital admission     *Urine: increased urine output past 24h (1400cc)    *LBM: not one documented since hospital admission , three days ago      ROS:  Review of Systems   Unable to perform ROS: Patient unresponsive       Reviewed current scheduled and prn medications for route, type, dose and frequency.  HYDROmorphone HCl-NaCl,       •  acetaminophen  •  bisacodyl  •  furosemide  •  glycopyrrolate  •  haloperidol lactate  •  HYDROmorphone  •  ketorolac  •  LORazepam  •  palliative care oral rinse  •  polyvinyl alcohol    Objective:   There were no vitals taken for this visit.     PPS: Palliative Performance Scale score as of 1/15/2021, 15:38 EST is 10% based on the following measures:   Ambulation: Totally bed bound  Activity and Evidence of Disease: Unable to do any work, extensive evidence of disease  Self-Care: Total care  Intake:  Mouth care only  LOC: Drowsy or coma      Physical Exam:  Physical Exam  Constitutional:       Comments: Actively Dying.   Comfortable  Minimally responsive to oral care   HENT:      Head:      Comments: + cheekbones prominent  + temporal wasting     Mouth/Throat:      Mouth: Mucous membranes are dry.   Eyes:      Comments: closed   Cardiovascular:      Comments: DHT  Pulmonary:      Comments: CTA  No  audible congestion  Respirations are nonlabored, tachypneic  Abdominal:      General: Abdomen is flat.      Palpations: Abdomen is soft.      Comments: Hypoactive BSx4   Musculoskeletal:      Comments: + significant muscle wasting   Skin:     General: Skin is dry.      Coloration: Skin is pale.   Neurological:      Comments: Does not follow commands   Psychiatric:      Comments: No facial grimacing; no moaning             L basal ganglia ICH       Assessment/Plan:     75yoF admitted in Hospice 1/14 for Left Basal Ganglia ICH.     Agitation/Restlessness  Anxiety  Dyspnea  Congestion  Constipation  End of Life Care    Continue dilaudid pca 0.5mg/hr    Continue lasix 20mg q6h    Continue ativan 0.5mg q8h    Continue scheduled and prn palliative oral rinse    Continue scheduled and prn eye gtts    Continue with scop patch    Discharge Disposition: actively dying    Discussion at bedside with family regarding end of life s/s and symptom mgmt. Support provided.    Coordinated care with Nursing.    Total Visit Time: 25min  Face to Face Time: 10min    Justification for care:  Patient meets criteria for acute in-patient care with required nursing assessment and interventions for symptoms with IV medications.      Karon Hansen, ADAM, MHA, APRN  [facility]  Hospice and Palliative Care Nurse Practitioner  01/15/21  08:45 EST

## 2021-01-15 NOTE — PLAN OF CARE
Goal Outcome Evaluation:  Plan of Care Reviewed With: spouse, caregiver  Progress: declining  Outcome Summary: Hospice.  PCA with CIVI dilaudid.  Has scheduled lasix but has also received IV robinul for increased secretions.  Scop patch employed.   and daughter at bedside.  Has not made any movement with her left arm as she had done before.  Feverish.  Given IV toradol for this.  Continues to decline.

## 2021-01-17 LAB
BACTERIA SPEC AEROBE CULT: NORMAL
BACTERIA SPEC AEROBE CULT: NORMAL

## (undated) DEVICE — FORCEPS RAD JAW W NEEDLE 160CM

## (undated) DEVICE — FORCEP SPEC RETRV BX AD 2 MMX155 CM 5 MM GI OVL CUP W/ NDL